# Patient Record
Sex: MALE | Race: WHITE | NOT HISPANIC OR LATINO | Employment: FULL TIME | URBAN - METROPOLITAN AREA
[De-identification: names, ages, dates, MRNs, and addresses within clinical notes are randomized per-mention and may not be internally consistent; named-entity substitution may affect disease eponyms.]

---

## 2024-03-05 ENCOUNTER — MEDICAL CORRESPONDENCE (OUTPATIENT)
Dept: HEALTH INFORMATION MANAGEMENT | Facility: CLINIC | Age: 39
End: 2024-03-05
Payer: COMMERCIAL

## 2024-03-06 DIAGNOSIS — R01.1 HEART MURMUR: Primary | ICD-10-CM

## 2024-03-13 ENCOUNTER — HOSPITAL ENCOUNTER (OUTPATIENT)
Dept: CARDIOLOGY | Facility: CLINIC | Age: 39
Discharge: HOME | End: 2024-03-13
Attending: FAMILY MEDICINE | Admitting: FAMILY MEDICINE
Payer: COMMERCIAL

## 2024-03-13 ENCOUNTER — TELEPHONE (OUTPATIENT)
Dept: CARDIOLOGY | Facility: CLINIC | Age: 39
End: 2024-03-13

## 2024-03-13 DIAGNOSIS — R01.1 HEART MURMUR: ICD-10-CM

## 2024-03-13 DIAGNOSIS — I35.1 AORTIC INSUFFICIENCY: Primary | ICD-10-CM

## 2024-03-13 LAB — LVEF ECHO: NORMAL

## 2024-03-13 PROCEDURE — 93306 TTE W/DOPPLER COMPLETE: CPT | Mod: 26 | Performed by: INTERNAL MEDICINE

## 2024-03-13 PROCEDURE — 93306 TTE W/DOPPLER COMPLETE: CPT

## 2024-03-13 NOTE — TELEPHONE ENCOUNTER
Received message from Dr. Carmona:     Marty Carmona MD [facility] Heart Team 4  Hi all,    I just read Mr. Garcia's echo, and this shows severe AI.  He needs to get in to see any MD for an urgent appointment within the next week - would you mind reaching out to him to let him know, and also alert his PCP?    Thanks!  Berto    Called to Dr. Mccollum's office at [facility] and left a message for the care team.     Called pt, no answer. Left VM requesting call back to Team 4 at [phone].     Addendum 3/13/24 - Received return call from pt. Reviewed Dr. Carmona's recommendations for an appointment within the next week due to severe AI seen on echo today. Pt verbalized understanding and agreement with plan. Offered urgent visit with Dr. Cuevas this Friday, 3/15/24 at 9:45 am and pt agreed. Message sent to scheduling. Reviewed [place] location.

## 2024-03-13 NOTE — TELEPHONE ENCOUNTER
Received return call from nursing staff at Dr. Mccollum's office, they requested echo report  faxed to [phone]. They will update Dr. Mccollum that pt has been scheduled for a Cardiology visit on 3/15/24.

## 2024-03-14 NOTE — TELEPHONE ENCOUNTER
Spoke with patient and he wanted to make sure there was no need to fast or any testing needed prior to visit with Dr. Cuevas tomorrow. Informed patient that nothing is needed prior. Dr. Cuevas will review echo results and her thoughts on next steps/follow ups. Pt verbalized understanding.

## 2024-03-14 NOTE — TELEPHONE ENCOUNTER
Received VM from patient requesting a call back as he had some questions about his upcoming visit. Tried to call patient back. No answer. Left voicemail for him to call team 4 back at [phone].

## 2024-03-15 ENCOUNTER — OFFICE VISIT (OUTPATIENT)
Dept: CARDIOLOGY | Facility: CLINIC | Age: 39
End: 2024-03-15
Payer: COMMERCIAL

## 2024-03-15 VITALS
SYSTOLIC BLOOD PRESSURE: 130 MMHG | BODY MASS INDEX: 37.59 KG/M2 | HEIGHT: 68 IN | DIASTOLIC BLOOD PRESSURE: 64 MMHG | RESPIRATION RATE: 15 BRPM | OXYGEN SATURATION: 100 % | HEART RATE: 86 BPM | WEIGHT: 248 LBS

## 2024-03-15 DIAGNOSIS — I35.1 SEVERE AORTIC VALVE REGURGITATION: Primary | ICD-10-CM

## 2024-03-15 PROCEDURE — 93000 ELECTROCARDIOGRAM COMPLETE: CPT | Performed by: INTERNAL MEDICINE

## 2024-03-15 PROCEDURE — 99205 OFFICE O/P NEW HI 60 MIN: CPT | Performed by: INTERNAL MEDICINE

## 2024-03-15 RX ORDER — DEXTROAMPHETAMINE SACCHARATE, AMPHETAMINE ASPARTATE, DEXTROAMPHETAMINE SULFATE AND AMPHETAMINE SULFATE 7.5; 7.5; 7.5; 7.5 MG/1; MG/1; MG/1; MG/1
30 TABLET ORAL 2 TIMES DAILY
COMMUNITY
Start: 2024-03-01

## 2024-03-15 RX ORDER — CETIRIZINE HYDROCHLORIDE 5 MG/1
5 TABLET ORAL EVERY MORNING
COMMUNITY

## 2024-03-15 RX ORDER — CLINDAMYCIN PHOSPHATE 11.9 MG/ML
SOLUTION TOPICAL DAILY PRN
COMMUNITY
Start: 2024-03-01 | End: 2025-03-01

## 2024-03-15 NOTE — PROGRESS NOTES
SERVICE DATE: March 15, 2024    REFERRING PROVIDER:  Dr. Marty Carmona.    PRIMARY CARE PROVIDER:  Bisi Mccollum  [facility] [place] [address]  [place] [address]    REASON FOR VISIT:  Severe aortic valve regurgitation due to possible bicuspid aortic valve.    HISTORY OF PRESENT ILLNESS:  Adam Garcia is 38 year old male, new to cardiology.  He was accompanied by his wife who is a pharmacist at [employer] [facility] [employer]. Patient is a never tobacco user, takes Adderall for ADHD, BMI is 37.    Adam has historically not sound frequent medical care and has not followed up with a PCP for many years.  In that context, he has been getting progressively short of breath with exertion for the last 8 to 10 months but has attribute it to being overweight and out of shape and did not seek medical care.  Because symptoms worsened (but no lower extremity edema, chest pain, orthopnea or PND) he established care with a primary team who auscultated a cardiac murmur and ordered an echocardiogram which showed severe aortic valve regurgitation, suspected bicuspid aortic valve, LVEF of 55-60% and patient was referred to cardiology.    I independently interpreted the transthoracic echocardiogram dated 3/13/2024.  There is severe eccentric aortic valve regurgitation. The aortic valve was not well-visualized but is likely bicuspid.  Mildly dilated left ventricle with an end-diastolic diameter of 5.7 cm.  Estimated LVEF 55-60%, normal dimension of ascending aorta 3.6 cm, normal aortic root of 3.7 cm. Normal right ventricular systolic function no other valvular heart disease.    ECG done today shows sinus rhythm with left ventricular hypertrophy by voltage criteria.    No known family history of valvular heart disease. He has no biological children.     Patient has no known history of congenital heart disease, no infectious endocarditis or chest wall irradiation, has not had a previous echocardiogram.  No tobacco use.  Social  alcohol intake.  No family history of known valvular heart disease.    On exam - /64, pulse 86 bpm, regular heart sounds, well audible holosystolic murmur heard best at the aortic area, 3/6, radiating to the right carotid.  No audible murmur of aortic coarctation.  Lungs are clear to auscultation.  No lower extremity edema.    DIAGNOSES/ASSESSMENT:  Severe aortic valve regurgitation, possibly due to bicuspid aortic valve disease.  Progressive NYHA class III dyspnea, secondary to 1.    Patient clearly has symptomatic severe aortic valve regurgitation with consistent physical examination.  His left ventricle is mildly dilated, his symptoms have been present for almost a year and progressed gradually, LVEF of 55 to 60% is mild to moderately reduced in the presence of severe AR.  I suspect his valve is bicuspid but its morphology was not clearly visualized on his transthoracic echocardiogram, so he will need additional testing to assess valve morphology, rule out concomitant aortopathy including coarctation of aorta (he is normotensive), and after testing referral to CV surgery.    PLAN:  Transesophageal echocardiogram.  Cardiac MRI.  CT angiogram of thoracic aorta.  Follow up with SHERRY to review results.  Patient has not sought dental care for several years.  Advised him to make an expedited appointment with a dentist which is a prerequisite prior to valve surgery.  I have personally discussed patient care with CV surgeon Dr. Nehemiah Kat and his RN, Marga Kenney.  Once testing is completed, consultation with Dr. Kat.  Patient is 38 years old, non-smoker.  Preoperative CT coronary angiogram may be appropriate (instead of an invasive angiogram) but will be at the discretion of Dr. Kat.  I have personally liaised with the scheduling team and ensured expedited testing.  Extensive education and counseling about bicuspid aortic valve, natural history, diagnosis of severe AR, role of surgery and need for other  "testing with patient and his wife.  Their questions were answered.        Mariel Cuevas MD      New patient.  75 minutes spent by me on the date of the encounter doing chart review, history and exam, documentation and further activities per the note.    The longitudinal plan of care for the condition(s) below were addressed during this visit. Due to the added complexity in care, I will continue to support Adam in the subsequent management of this condition(s) and with the ongoing continuity of care of this condition(s).    Problem List Items Addressed This Visit as of 3/15/2024   Severe aortic valve regurgitation.         Vitals: /64   Pulse 86   Resp 15   Ht 1.727 m (5' 8\")   Wt 112.5 kg (248 lb)   SpO2 100%   BMI 37.71 kg/m    Wt Readings from Last 5 Encounters:   03/15/24 112.5 kg (248 lb)         Orders Placed This Encounter   Procedures    CT Chest Angio w/o & w Contrast    Follow-Up with Cardiology SHERRY    EKG 12-lead complete w/read - Clinics (performed today)    Transesophageal Echocardiogram           CURRENT MEDICATIONS:  Current Outpatient Medications   Medication Sig Dispense Refill    aluminum chloride (DRYSOL) 20 % external solution       amphetamine-dextroamphetamine (ADDERALL) 30 MG tablet Take 30 mg by mouth daily      cetirizine (ZYRTEC) 5 MG tablet Take 5 mg by mouth daily      clindamycin (CLEOCIN T) 1 % external solution Apply topically 2 times daily           ALLERGIES:  No Known Allergies    PAST MEDICAL HISTORY:    Past Medical History:   Diagnosis Date    ADHD (attention deficit hyperactivity disorder)     Obesity        PAST SURGICAL HISTORY:    Past Surgical History:   Procedure Laterality Date    NO HISTORY OF SURGERY         FAMILY HISTORY:    Family History   Problem Relation Age of Onset    Valvular heart disease No family hx of                          "

## 2024-03-15 NOTE — LETTER
3/15/2024    Bisi Mccollum MD  [facility] [address]  [place] [address]    RE: Adam Garcia       Dear Colleague,     I had the pleasure of seeing Adam Garcia in the [facility].    SERVICE DATE: March 15, 2024    REFERRING PROVIDER:  Dr. Marty Carmona.    PRIMARY CARE PROVIDER:  [facility] [address] [facility] [address]  [place] [address]    REASON FOR VISIT:  Severe aortic valve regurgitation due to possible bicuspid aortic valve.    HISTORY OF PRESENT ILLNESS:  Adam Garcia is 38 year old male, new to cardiology.  He was accompanied by his wife who is a pharmacist at [employer]. Patient is a never tobacco user, takes Adderall for ADHD, BMI is 37.    Adam has historically not sound frequent medical care and has not followed up with a PCP for many years.  In that context, he has been getting progressively short of breath with exertion for the last 8 to 10 months but has attribute it to being overweight and out of shape and did not seek medical care.  Because symptoms worsened (but no lower extremity edema, chest pain, orthopnea or PND) he established care with a primary team who auscultated a cardiac murmur and ordered an echocardiogram which showed severe aortic valve regurgitation, suspected bicuspid aortic valve, LVEF of 55-60% and patient was referred to cardiology.    I independently interpreted the transthoracic echocardiogram dated 3/13/2024.  There is severe eccentric aortic valve regurgitation. The aortic valve was not well-visualized but is likely bicuspid.  Mildly dilated left ventricle with an end-diastolic diameter of 5.7 cm.  Estimated LVEF 55-60%, normal dimension of ascending aorta 3.6 cm, normal aortic root of 3.7 cm. Normal right ventricular systolic function no other valvular heart disease.    ECG done today shows sinus rhythm with left ventricular hypertrophy by voltage criteria.    No known family history of valvular  heart disease. He has no biological children.     Patient has no known history of congenital heart disease, no infectious endocarditis or chest wall irradiation, has not had a previous echocardiogram.  No tobacco use.  Social alcohol intake.  No family history of known valvular heart disease.    On exam - /64, pulse 86 bpm, regular heart sounds, well audible holosystolic murmur heard best at the aortic area, 3/6, radiating to the right carotid.  No audible murmur of aortic coarctation.  Lungs are clear to auscultation.  No lower extremity edema.    DIAGNOSES/ASSESSMENT:  Severe aortic valve regurgitation, possibly due to bicuspid aortic valve disease.  Progressive NYHA class III dyspnea, secondary to 1.    Patient clearly has symptomatic severe aortic valve regurgitation with consistent physical examination.  His left ventricle is mildly dilated, his symptoms have been present for almost a year and progressed gradually, LVEF of 55 to 60% is mild to moderately reduced in the presence of severe AR.  I suspect his valve is bicuspid but its morphology was not clearly visualized on his transthoracic echocardiogram, so he will need additional testing to assess valve morphology, rule out concomitant aortopathy including coarctation of aorta (he is normotensive), and after testing referral to CV surgery.    PLAN:  Transesophageal echocardiogram.  Cardiac MRI.  CT angiogram of thoracic aorta.  Follow up with SHERRY to review results.  Patient has not sought dental care for several years.  Advised him to make an expedited appointment with a dentist which is a prerequisite prior to valve surgery.  I have personally discussed patient care with CV surgeon Dr. Nehemiah Kat and his RN, Marga Kenney.  Once testing is completed, consultation with Dr. Kat.  Patient is 38 years old, non-smoker.  Preoperative CT coronary angiogram may be appropriate (instead of an invasive angiogram) but will be at the discretion of   "[facility].  I have personally liaised with the scheduling team and ensured expedited testing.  Extensive education and counseling about bicuspid aortic valve, natural history, diagnosis of severe AR, role of surgery and need for other testing with patient and his wife.  Their questions were answered.        Mariel Cuevas MD      New patient.  75 minutes spent by me on the date of the encounter doing chart review, history and exam, documentation and further activities per the note.    The longitudinal plan of care for the condition(s) below were addressed during this visit. Due to the added complexity in care, I will continue to support Adam in the subsequent management of this condition(s) and with the ongoing continuity of care of this condition(s).    Problem List Items Addressed This Visit as of 3/15/2024   Severe aortic valve regurgitation.         Vitals: /64   Pulse 86   Resp 15   Ht 1.727 m (5' 8\")   Wt 112.5 kg (248 lb)   SpO2 100%   BMI 37.71 kg/m    Wt Readings from Last 5 Encounters:   03/15/24 112.5 kg (248 lb)         Orders Placed This Encounter   Procedures    CT Chest Angio w/o & w Contrast    Follow-Up with Cardiology SHERRY    EKG 12-lead complete w/read - Clinics (performed today)    Transesophageal Echocardiogram           CURRENT MEDICATIONS:  Current Outpatient Medications   Medication Sig Dispense Refill    aluminum chloride (DRYSOL) 20 % external solution       amphetamine-dextroamphetamine (ADDERALL) 30 MG tablet Take 30 mg by mouth daily      cetirizine (ZYRTEC) 5 MG tablet Take 5 mg by mouth daily      clindamycin (CLEOCIN T) 1 % external solution Apply topically 2 times daily           ALLERGIES:  No Known Allergies    PAST MEDICAL HISTORY:    Past Medical History:   Diagnosis Date    ADHD (attention deficit hyperactivity disorder)     Obesity        PAST SURGICAL HISTORY:    Past Surgical History:   Procedure Laterality Date    NO HISTORY OF SURGERY         FAMILY " HISTORY:    Family History   Problem Relation Age of Onset    Valvular heart disease No family hx of        Thank you for allowing me to participate in the care of your patient.      Sincerely,     Mariel Cuveas MD     [facility]  cc:   Marty Carmona MD  [address] [address]

## 2024-03-18 DIAGNOSIS — I35.1 AORTIC VALVE INSUFFICIENCY, ETIOLOGY OF CARDIAC VALVE DISEASE UNSPECIFIED: Primary | ICD-10-CM

## 2024-03-22 ENCOUNTER — TELEPHONE (OUTPATIENT)
Dept: CARDIOLOGY | Facility: CLINIC | Age: 39
End: 2024-03-22
Payer: COMMERCIAL

## 2024-03-22 NOTE — TELEPHONE ENCOUNTER
Attempted to contact patient to review prep for YOHANNES on 3/28/2024. Left message for patient to call back to Team 2 R.N.s @ [phone]       DCCV/YOHANNES prep instructions    Patient is scheduled for a YOHANNES at [facility] [place] [address] [place] [address] - Main Entrance of the Hospital, on 3/28/2024.  Check in time is at 0730 and procedure to follow.    Patient instructed to remain NPO for solid foods and liquids for 8 hours prior to arrival for their YOHANNES or YOHANNES with DCCV.    Patient is not diabetic.     Patient is not taking Digoxin.    Patient is taking not on diuretics. and has been advised to hold this the morning of the procedure.    Pt is not on a SGLT2 inhibitor.    Pt is not on a GLP-1 Agonist    Patient advised to take their other daily medications the morning of the procedure with small sips of water.     Verified patient has someone available to drive them home from the hospital and can stay with them for 24 hours after the procedure.     Patient advised to notify care team with any new COVID like symptoms prior to procedure.    Patient advised to notify care team with any new COVID like symptoms prior to procedure. Day of procedure phone number: Joy at [phone]    Patient is aware of visitor policy.    Patient expresses understanding of above instructions and denies further questions at this time.      Mely Diaz RN  [facility]

## 2024-03-28 ENCOUNTER — HOSPITAL ENCOUNTER (OUTPATIENT)
Facility: CLINIC | Age: 39
Discharge: HOME | End: 2024-03-28
Admitting: INTERNAL MEDICINE
Payer: COMMERCIAL

## 2024-03-28 ENCOUNTER — HOSPITAL ENCOUNTER (OUTPATIENT)
Dept: CARDIOLOGY | Facility: CLINIC | Age: 39
Discharge: HOME | End: 2024-03-28
Attending: INTERNAL MEDICINE | Admitting: INTERNAL MEDICINE
Payer: COMMERCIAL

## 2024-03-28 VITALS
SYSTOLIC BLOOD PRESSURE: 106 MMHG | RESPIRATION RATE: 16 BRPM | BODY MASS INDEX: 37.28 KG/M2 | WEIGHT: 246 LBS | OXYGEN SATURATION: 97 % | DIASTOLIC BLOOD PRESSURE: 50 MMHG | HEIGHT: 68 IN | HEART RATE: 76 BPM

## 2024-03-28 DIAGNOSIS — I35.1 SEVERE AORTIC VALVE REGURGITATION: ICD-10-CM

## 2024-03-28 LAB — LVEF ECHO: NORMAL

## 2024-03-28 PROCEDURE — 93325 DOPPLER ECHO COLOR FLOW MAPG: CPT

## 2024-03-28 PROCEDURE — 258N000003 HC RX IP 258 OP 636

## 2024-03-28 PROCEDURE — 93320 DOPPLER ECHO COMPLETE: CPT | Mod: 26 | Performed by: INTERNAL MEDICINE

## 2024-03-28 PROCEDURE — 999N000184 HC STATISTIC TELEMETRY

## 2024-03-28 PROCEDURE — 250N000011 HC RX IP 250 OP 636

## 2024-03-28 PROCEDURE — 93325 DOPPLER ECHO COLOR FLOW MAPG: CPT | Mod: 26 | Performed by: INTERNAL MEDICINE

## 2024-03-28 PROCEDURE — 999N000183 HC STATISTIC TEE INCLUDES SEDATION

## 2024-03-28 PROCEDURE — 250N000009 HC RX 250

## 2024-03-28 PROCEDURE — 93312 ECHO TRANSESOPHAGEAL: CPT | Mod: 26 | Performed by: INTERNAL MEDICINE

## 2024-03-28 RX ORDER — LIDOCAINE 50 MG/G
OINTMENT TOPICAL ONCE
Status: COMPLETED | OUTPATIENT
Start: 2024-03-28 | End: 2024-03-28

## 2024-03-28 RX ORDER — FENTANYL CITRATE 50 UG/ML
25 INJECTION, SOLUTION INTRAMUSCULAR; INTRAVENOUS
Status: DISCONTINUED | OUTPATIENT
Start: 2024-03-28 | End: 2024-03-28 | Stop reason: HOSPADM

## 2024-03-28 RX ORDER — FLUMAZENIL 0.1 MG/ML
0.2 INJECTION, SOLUTION INTRAVENOUS
Status: DISCONTINUED | OUTPATIENT
Start: 2024-03-28 | End: 2024-03-28 | Stop reason: HOSPADM

## 2024-03-28 RX ORDER — LIDOCAINE HYDROCHLORIDE 40 MG/ML
1.5 SOLUTION TOPICAL ONCE
Status: COMPLETED | OUTPATIENT
Start: 2024-03-28 | End: 2024-03-28

## 2024-03-28 RX ORDER — LIDOCAINE 40 MG/G
CREAM TOPICAL
Status: DISCONTINUED | OUTPATIENT
Start: 2024-03-28 | End: 2024-03-28 | Stop reason: HOSPADM

## 2024-03-28 RX ORDER — DEXTROSE MONOHYDRATE 25 G/50ML
9.5 INJECTION, SOLUTION INTRAVENOUS
Status: DISCONTINUED | OUTPATIENT
Start: 2024-03-28 | End: 2024-03-28 | Stop reason: HOSPADM

## 2024-03-28 RX ORDER — SODIUM CHLORIDE 9 MG/ML
INJECTION, SOLUTION INTRAVENOUS CONTINUOUS PRN
Status: DISCONTINUED | OUTPATIENT
Start: 2024-03-28 | End: 2024-03-28 | Stop reason: HOSPADM

## 2024-03-28 RX ORDER — OMEGA-3 FATTY ACIDS/FISH OIL 300-1000MG
400 CAPSULE ORAL EVERY 4 HOURS PRN
Status: ON HOLD | COMMUNITY
End: 2024-06-16

## 2024-03-28 RX ORDER — FENTANYL CITRATE 50 UG/ML
50 INJECTION, SOLUTION INTRAMUSCULAR; INTRAVENOUS ONCE
Status: COMPLETED | OUTPATIENT
Start: 2024-03-28 | End: 2024-03-28

## 2024-03-28 RX ORDER — NALOXONE HYDROCHLORIDE 0.4 MG/ML
0.4 INJECTION, SOLUTION INTRAMUSCULAR; INTRAVENOUS; SUBCUTANEOUS
Status: DISCONTINUED | OUTPATIENT
Start: 2024-03-28 | End: 2024-03-28 | Stop reason: HOSPADM

## 2024-03-28 RX ORDER — NALOXONE HYDROCHLORIDE 0.4 MG/ML
0.2 INJECTION, SOLUTION INTRAMUSCULAR; INTRAVENOUS; SUBCUTANEOUS
Status: DISCONTINUED | OUTPATIENT
Start: 2024-03-28 | End: 2024-03-28 | Stop reason: HOSPADM

## 2024-03-28 RX ORDER — GLYCOPYRROLATE 0.2 MG/ML
0.1 INJECTION, SOLUTION INTRAMUSCULAR; INTRAVENOUS ONCE
Status: COMPLETED | OUTPATIENT
Start: 2024-03-28 | End: 2024-03-28

## 2024-03-28 RX ADMIN — MIDAZOLAM 2 MG: 1 INJECTION INTRAMUSCULAR; INTRAVENOUS at 09:24

## 2024-03-28 RX ADMIN — GLYCOPYRROLATE 0.1 MG: 0.2 INJECTION, SOLUTION INTRAMUSCULAR; INTRAVENOUS at 09:03

## 2024-03-28 RX ADMIN — MIDAZOLAM 1 MG: 1 INJECTION INTRAMUSCULAR; INTRAVENOUS at 09:31

## 2024-03-28 RX ADMIN — LIDOCAINE HYDROCHLORIDE 1.5 ML: 40 SOLUTION TOPICAL at 09:01

## 2024-03-28 RX ADMIN — TOPICAL ANESTHETIC 0.5 ML: 200 SPRAY DENTAL; PERIODONTAL at 09:16

## 2024-03-28 RX ADMIN — FENTANYL CITRATE 50 MCG: 50 INJECTION, SOLUTION INTRAMUSCULAR; INTRAVENOUS at 09:30

## 2024-03-28 RX ADMIN — MIDAZOLAM 0.5 MG: 1 INJECTION INTRAMUSCULAR; INTRAVENOUS at 09:37

## 2024-03-28 RX ADMIN — MIDAZOLAM 1 MG: 1 INJECTION INTRAMUSCULAR; INTRAVENOUS at 09:27

## 2024-03-28 RX ADMIN — FENTANYL CITRATE 50 MCG: 50 INJECTION, SOLUTION INTRAMUSCULAR; INTRAVENOUS at 09:25

## 2024-03-28 RX ADMIN — MIDAZOLAM 0.5 MG: 1 INJECTION INTRAMUSCULAR; INTRAVENOUS at 09:45

## 2024-03-28 RX ADMIN — FENTANYL CITRATE 25 MCG: 50 INJECTION, SOLUTION INTRAMUSCULAR; INTRAVENOUS at 09:37

## 2024-03-28 RX ADMIN — SODIUM CHLORIDE: 9 INJECTION, SOLUTION INTRAVENOUS at 08:27

## 2024-03-28 ASSESSMENT — ACTIVITIES OF DAILY LIVING (ADL)
ADLS_ACUITY_SCORE: 35

## 2024-03-28 NOTE — PROGRESS NOTES
Care Suites Admission Nursing Note    Patient Information  Name: Adam Garcia  Age: 38 year old  Reason for admission: YOHANNES  Care Suites arrival time: 0730    Visitor Information  Name: Tuesday- wife     Patient Admission/Assessment   Pre-procedure assessment complete: Yes  If abnormal assessment/labs, provider notified: N/A  NPO: Yes  Medications held per instructions/orders: N/A  Consent: deferred  If applicable, pregnancy test status: deferred  Patient oriented to room: Yes  Education/questions answered: Yes  Plan/other: AVS reviewed pre procedure with pt and wife. Proceed as planned per orders.    Discharge Planning  Discharge name/phone number: Wife [phone]  Overnight post sedation caregiver: Wife  Discharge location: home    Kortney Marino RN

## 2024-03-28 NOTE — PROGRESS NOTES
Care Suites Discharge Nursing Note    Patient Information  Name: Adam Garcia  Age: 38 year old    Discharge Education:  Discharge instructions reviewed: Yes  Additional education/resources provided: none  Patient/patient representative verbalizes understanding: Yes  Patient discharging on new medications: No  Medication education completed: N/A    Discharge Plans:   Discharge location: home  Discharge ride contacted: Yes  Approximate discharge time: 1100    Discharge Criteria:  Discharge criteria met and vital signs stable: Yes  Reports mild sore throat, no diff swallowing.      Patient Belongs:  Patient belongings returned to patient: Yes    Kortney Marino RN

## 2024-03-28 NOTE — PROGRESS NOTES
Care Suites Procedure Nursing Note    Patient Information  Name: Adam Garcia  Age: 38 year old    Procedure  Procedure: YOHANNES  Procedure start time: 0924  Procedure complete time: 1005  Concerns/abnormal assessment: none  If abnormal assessment, provider notified: N/A  Plan/Other: YOHANNES complete without difficulty.  Pt drowsy throughout intermittently awake but appeared comfortable.  VSS throughout.    Sedation given: Versed 5 mg and Fentanyl 125 mcg.  Tolerated well.    Kortney Marino RN

## 2024-03-28 NOTE — PRE-PROCEDURE
GENERAL PRE-PROCEDURE:   Procedure:  YOHANNES  Date/Time:  3/28/2024 9:30 AM    Written consent obtained?: Yes    Risks and benefits: Risks, benefits and alternatives were discussed    Consent given by:  Patient  Patient states understanding of procedure being performed: Yes    Patient's understanding of procedure matches consent: Yes    Procedure consent matches procedure scheduled: Yes    Expected level of sedation:  Moderate  Appropriately NPO:  Yes  ASA Class:  3  Mallampati  :  Grade 2- soft palate, base of uvula, tonsillar pillars, and portion of posterior pharyngeal wall visible  Lungs:  Lungs clear with good breath sounds bilaterally  Heart:  Normal heart sounds and rate and systolic murmur  History & Physical reviewed:  History and physical reviewed and no updates needed  Statement of review:  I have reviewed the lab findings, diagnostic data, medications, and the plan for sedation    The risks and benefits of YOHANNES have been discussed with the patient in detail. Patient denies any swallowing difficulty or active upper GI bleeding problems. The patient understands the above mentioned risks and is willing to proceed with the YOHANNES.

## 2024-03-28 NOTE — DISCHARGE INSTRUCTIONS
YOHANNES  (Transesophageal Echocardiogram)  Discharge Instructions    After you go home:    Have an adult stay with you for 6 hours.       For 24 hours - due to the sedation you received:  Relax and take it easy.  Do NOT make any important or legal decisions.  Do NOT drive or operate machines at home or at work.  Do NOT drink alcohol.    Diet:  You may resume your normal diet, but no scratchy foods for two days.  If your throat is sore, eat cold, bland or soft foods.  You may have heartburn if the tube used in the exam entered your stomach.  If so:   - Do not eat acidic and spicy foods.   - Do not eat three hours before bedtime. Clear liquids are okay.   - When lying down, use two pillows to raise your head.    Medicines:    Take your medications, including blood thinners, unless your provider tells you not to.  If you have stopped any medicines, check with your provider about when to restart them.  You may take Tylenol (Acetaminophen) if your throat is sore.  You may take antacids if you have heartburn.      Follow Up Appointments:    Follow up with your cardiologist at [facility] of patient preference as instructed.  Follow up with your primary care provider as needed.    Call the clinic if:    You have heartburn that is severe or lasts more than 72 hours.  You have a sore throat that feels worse after 72 hours.  You have shortness of breath, neck pain, chest pain, fever, chills, coughing up blood, or other unusual signs.  Questions or concerns      [facility] [place]:    [phone] [facility] (7 days a week)

## 2024-04-01 ENCOUNTER — TELEPHONE (OUTPATIENT)
Dept: CARDIOLOGY | Facility: CLINIC | Age: 39
End: 2024-04-01
Payer: COMMERCIAL

## 2024-04-01 NOTE — TELEPHONE ENCOUNTER
Message from patient asking if he can keep using his adderall? PCP won't refill without OK from cardiology. Patient will be out of meds in a couple days.      Per Dr. Cuevas's dictation 3/15/2024:  Severe aortic valve regurgitation, possibly due to bicuspid aortic valve disease.  Progressive NYHA class III dyspnea, secondary to 1.    Patient has scheduled his work-up:  4/8 CTA coronary and CTA chest  4/11 MRI  4/16 see Dr. Kat  4/29 see SHERRY Gogo More    Will message Dr. Cuevas to review

## 2024-04-02 NOTE — TELEPHONE ENCOUNTER
Spoke with Patient and Dr. Cuevas's reply reviewed.  Patient will contact PCP regarding reply -     Dr. Cuevas's reply -   Yes, okay. 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 PCP to refill Adderall if appropriate - will not be done by cardiology.     Thanks.   JEAN

## 2024-04-08 ENCOUNTER — HOSPITAL ENCOUNTER (OUTPATIENT)
Dept: CARDIOLOGY | Facility: CLINIC | Age: 39
Discharge: HOME | End: 2024-04-08
Attending: INTERNAL MEDICINE
Payer: COMMERCIAL

## 2024-04-08 ENCOUNTER — HOSPITAL ENCOUNTER (OUTPATIENT)
Dept: CARDIOLOGY | Facility: CLINIC | Age: 39
Discharge: HOME | End: 2024-04-08
Attending: SURGERY
Payer: COMMERCIAL

## 2024-04-08 VITALS — HEART RATE: 67 BPM | DIASTOLIC BLOOD PRESSURE: 50 MMHG | SYSTOLIC BLOOD PRESSURE: 109 MMHG

## 2024-04-08 DIAGNOSIS — I35.1 SEVERE AORTIC VALVE REGURGITATION: ICD-10-CM

## 2024-04-08 DIAGNOSIS — I35.1 AORTIC VALVE INSUFFICIENCY, ETIOLOGY OF CARDIAC VALVE DISEASE UNSPECIFIED: ICD-10-CM

## 2024-04-08 PROCEDURE — 75565 CARD MRI VELOC FLOW MAPPING: CPT | Mod: 26 | Performed by: INTERNAL MEDICINE

## 2024-04-08 PROCEDURE — 75561 CARDIAC MRI FOR MORPH W/DYE: CPT | Mod: 26 | Performed by: INTERNAL MEDICINE

## 2024-04-08 PROCEDURE — A9585 GADOBUTROL INJECTION: HCPCS | Performed by: INTERNAL MEDICINE

## 2024-04-08 PROCEDURE — 75565 CARD MRI VELOC FLOW MAPPING: CPT

## 2024-04-08 PROCEDURE — 255N000002 HC RX 255 OP 636: Performed by: INTERNAL MEDICINE

## 2024-04-08 PROCEDURE — 71275 CT ANGIOGRAPHY CHEST: CPT | Mod: 26 | Performed by: INTERNAL MEDICINE

## 2024-04-08 PROCEDURE — 250N000011 HC RX IP 250 OP 636: Performed by: SURGERY

## 2024-04-08 PROCEDURE — 250N000013 HC RX MED GY IP 250 OP 250 PS 637: Performed by: SURGERY

## 2024-04-08 PROCEDURE — 75574 CT ANGIO HRT W/3D IMAGE: CPT | Mod: 26 | Performed by: INTERNAL MEDICINE

## 2024-04-08 PROCEDURE — 250N000009 HC RX 250: Performed by: SURGERY

## 2024-04-08 PROCEDURE — 75574 CT ANGIO HRT W/3D IMAGE: CPT

## 2024-04-08 PROCEDURE — 71275 CT ANGIOGRAPHY CHEST: CPT

## 2024-04-08 RX ORDER — IVABRADINE 5 MG/1
5-15 TABLET, FILM COATED ORAL
Status: COMPLETED | OUTPATIENT
Start: 2024-04-08 | End: 2024-04-08

## 2024-04-08 RX ORDER — METOPROLOL TARTRATE 25 MG/1
25-100 TABLET, FILM COATED ORAL
Status: COMPLETED | OUTPATIENT
Start: 2024-04-08 | End: 2024-04-08

## 2024-04-08 RX ORDER — ACYCLOVIR 200 MG/1
0-1 CAPSULE ORAL
Status: DISCONTINUED | OUTPATIENT
Start: 2024-04-08 | End: 2024-04-09 | Stop reason: HOSPADM

## 2024-04-08 RX ORDER — DIPHENHYDRAMINE HYDROCHLORIDE 50 MG/ML
25-50 INJECTION INTRAMUSCULAR; INTRAVENOUS
Status: DISCONTINUED | OUTPATIENT
Start: 2024-04-08 | End: 2024-04-09 | Stop reason: HOSPADM

## 2024-04-08 RX ORDER — DIPHENHYDRAMINE HCL 25 MG
25 CAPSULE ORAL
Status: DISCONTINUED | OUTPATIENT
Start: 2024-04-08 | End: 2024-04-09 | Stop reason: HOSPADM

## 2024-04-08 RX ORDER — METHYLPREDNISOLONE SODIUM SUCCINATE 125 MG/2ML
125 INJECTION, POWDER, LYOPHILIZED, FOR SOLUTION INTRAMUSCULAR; INTRAVENOUS
Status: DISCONTINUED | OUTPATIENT
Start: 2024-04-08 | End: 2024-04-09 | Stop reason: HOSPADM

## 2024-04-08 RX ORDER — ONDANSETRON 2 MG/ML
4 INJECTION INTRAMUSCULAR; INTRAVENOUS
Status: DISCONTINUED | OUTPATIENT
Start: 2024-04-08 | End: 2024-04-09 | Stop reason: HOSPADM

## 2024-04-08 RX ORDER — METOPROLOL TARTRATE 1 MG/ML
5-15 INJECTION, SOLUTION INTRAVENOUS
Status: DISCONTINUED | OUTPATIENT
Start: 2024-04-08 | End: 2024-04-09 | Stop reason: HOSPADM

## 2024-04-08 RX ORDER — GADOBUTROL 604.72 MG/ML
14 INJECTION INTRAVENOUS ONCE
Status: COMPLETED | OUTPATIENT
Start: 2024-04-08 | End: 2024-04-08

## 2024-04-08 RX ORDER — DILTIAZEM HCL 60 MG
120 TABLET ORAL
Status: DISCONTINUED | OUTPATIENT
Start: 2024-04-08 | End: 2024-04-09 | Stop reason: HOSPADM

## 2024-04-08 RX ORDER — IOPAMIDOL 755 MG/ML
500 INJECTION, SOLUTION INTRAVASCULAR ONCE
Status: COMPLETED | OUTPATIENT
Start: 2024-04-08 | End: 2024-04-08

## 2024-04-08 RX ORDER — DILTIAZEM HYDROCHLORIDE 5 MG/ML
10-15 INJECTION INTRAVENOUS
Status: DISCONTINUED | OUTPATIENT
Start: 2024-04-08 | End: 2024-04-09 | Stop reason: HOSPADM

## 2024-04-08 RX ORDER — NITROGLYCERIN 0.4 MG/1
0.4 TABLET SUBLINGUAL
Status: DISCONTINUED | OUTPATIENT
Start: 2024-04-08 | End: 2024-04-09 | Stop reason: HOSPADM

## 2024-04-08 RX ADMIN — METOPROLOL TARTRATE 50 MG: 50 TABLET, FILM COATED ORAL at 08:19

## 2024-04-08 RX ADMIN — METOPROLOL TARTRATE 10 MG: 5 INJECTION INTRAVENOUS at 09:30

## 2024-04-08 RX ADMIN — IOPAMIDOL 120 ML: 755 INJECTION, SOLUTION INTRAVENOUS at 09:58

## 2024-04-08 RX ADMIN — IVABRADINE 10 MG: 5 TABLET, FILM COATED ORAL at 08:20

## 2024-04-08 RX ADMIN — GADOBUTROL 14 ML: 604.72 INJECTION INTRAVENOUS at 11:38

## 2024-04-08 RX ADMIN — NITROGLYCERIN 0.4 MG: 0.4 TABLET SUBLINGUAL at 09:46

## 2024-04-09 ENCOUNTER — TELEPHONE (OUTPATIENT)
Dept: CARDIOLOGY | Facility: CLINIC | Age: 39
End: 2024-04-09
Payer: COMMERCIAL

## 2024-04-09 NOTE — TELEPHONE ENCOUNTER
MRI routed to Dr Cuevas for review, ordered for pre-surgery planning. CT chest, CTA angiogram, and YOHANNES previously completed. Surgery consult is 4/16/24 w/ Dr Kat.       1. The LV is severely dilated with normal wall thickness. The global systolic function is mildly reduced. The LVEF is 53%. There is mild global hypokinesis of the left ventricle.   2. The RV is normal in cavity size. The global systolic function is low normal. The RVEF is 50%.   3. Both atria are normal in size.  4. The aortic valve is trileaflet. There is severe aortic regurgitation (regurgitant volume of 105 ml, corresponding to a regurgitant fraction of 46%).  The jet appears to originate from a region of  malcoaptation between the non-coronary and left coronary cusps.  Diastolic flow reversal is noted within the descending thoracic aorta.   5. Late gadolinium enhancement imaging shows no MI, fibrosis or infiltrative disease.      CONCLUSIONS:   1. Severely dilated left ventricle with mildly reduced systolic function.  2. Trileaflet aortic valve with severe aortic regurgitation. The jet appears to originate from a region of malcoaptation between the non-coronary and left coronary cusps.   3. No late enhancement to suggest prior infarct, inflammation or infiltration.

## 2024-04-09 NOTE — TELEPHONE ENCOUNTER
Mariel Cuevas MD  You; [facility] Team 24 minutes ago (10:19 AM)     Results as expected. Thanks.    SJ       [employer] message sent to patient.

## 2024-04-16 ENCOUNTER — DOCUMENTATION ONLY (OUTPATIENT)
Dept: OTHER | Facility: CLINIC | Age: 39
End: 2024-04-16

## 2024-04-16 ENCOUNTER — OFFICE VISIT (OUTPATIENT)
Dept: CARDIOLOGY | Facility: CLINIC | Age: 39
End: 2024-04-16
Payer: COMMERCIAL

## 2024-04-16 VITALS
BODY MASS INDEX: 36.37 KG/M2 | OXYGEN SATURATION: 100 % | HEART RATE: 89 BPM | WEIGHT: 240 LBS | SYSTOLIC BLOOD PRESSURE: 139 MMHG | HEIGHT: 68 IN | DIASTOLIC BLOOD PRESSURE: 68 MMHG

## 2024-04-16 DIAGNOSIS — I35.1 SEVERE AORTIC VALVE REGURGITATION: Primary | ICD-10-CM

## 2024-04-16 PROCEDURE — 99205 OFFICE O/P NEW HI 60 MIN: CPT | Performed by: SURGERY

## 2024-04-16 NOTE — PROGRESS NOTES
I saw patient and his wife in consultation with Dr. Kat. Pre op needs, dental clearance discussed. Pre op education done. Plan AVR/R in near future.

## 2024-04-16 NOTE — PROGRESS NOTES
[facility]  Cardiovascular and Thoracic Surgery Clinic Consultation    Adam Garcia MRN# [id number]   YOB: 1985 Age: 38 year old   Date of Admission: (Not on file)     Reason for consult: I was asked by Dr. Cuevas to evaluate this patient for severe symptomatic aortic valve insufficiency           Assessment and Plan:   Mr. Garcia is a very pleasant 39 yo male with no significant past medical history who was recently found to have severe AI. YOHANNES demonstrates severe AI with an eccentric jet from what appears to be prolapse of the noncoronary cusp as the etiology of the AI jet. He endorses dyspnea on exertion and fatigue, symptoms associated with severe AI. LV is mildly dilated. He has no coronary artery disease, and his aortic root and ascending aorta is normal.    My recommendation is a mechanical aortic valve replacement. However, it is possible that the aortic valve could be repaired if the underlying etiology is indeed from an isolated noncoronary cusp prolapse. This will be determined intraoperatively. He understands the need for lifelong anticoagulation with Coumadin if the aortic valve is replaced, which I explained will be the likely case.    I explained to him the diagnosis in detail, the image findings, and the reason for recommending the proposed operation. I went over the risks and benefits of the operation, as well as the possible complications associated with the surgery. These complications include, but are not strictly limited to, infection, bleeding, stroke, postop MI, postop arrhythmias, pulmonary and renal complications. I think he is an overall excellent surgical candidate, and I quoted an operative mortality risk of 1%. He understands and agrees to proceed.    It was a pleasure to meet Mr. Garcia today, and thank you very much for this referral.               Attestation:  Nehemiah Kat MD           Chief Complaint:   Shortness of breath: with  exertion.               History of Present Illness:   This patient is a 38 year old male who presents with severe aortic valve regurgitation. He was referred to me for a surgical evaluation.               Past Medical History:     Past Medical History:   Diagnosis Date    ADHD (attention deficit hyperactivity disorder)     Obesity              Past Surgical History:     Past Surgical History:   Procedure Laterality Date    NO HISTORY OF SURGERY                 Social History:     Social History     Tobacco Use    Smoking status: Former     Types: Cigarettes    Smokeless tobacco: Never   Substance Use Topics    Alcohol use: Yes             Family History:     Family History   Problem Relation Age of Onset    Valvular heart disease No family hx of              Immunizations:     Immunization History   Administered Date(s) Administered    COVID-19 12+ (2023-24) (Pfizer) 10/20/2023    COVID-19 Bivalent 12+ (Pfizer) 10/14/2022    COVID-19 MONOVALENT 12+ (Pfizer) 12/11/2021             Allergies:   No Known Allergies          Medications:     Current Outpatient Medications   Medication Sig Dispense Refill    aluminum chloride (DRYSOL) 20 % external solution       amphetamine-dextroamphetamine (ADDERALL) 30 MG tablet Take 30 mg by mouth daily      cetirizine (ZYRTEC) 5 MG tablet Take 5 mg by mouth daily      clindamycin (CLEOCIN T) 1 % external solution Apply topically 2 times daily      ibuprofen (ADVIL/MOTRIN) 200 MG capsule Take 400 mg by mouth every 4 hours as needed for fever       No current facility-administered medications for this visit.             Review of Systems:   The 10 point Review of Systems is negative other than noted in the HPI         Physical Exam:   Vitals were reviewed                     General: pleasant, in no acute distress  CV: RRR, normal S1 and S2, grade 2/6 diastolic murmur at the RUSB  Resp: CTAB  Abd: soft, NT/ND  Ext: no edema or cyanosis  Neuro: no focal deficits              Data:   No  "results found for: \"WBC\", \"HGB\", \"HCT\", \"PLT\", \"NA\", \"POTASSIUM\", \"CHLORIDE\", \"CO2\", \"BUN\", \"CR\", \"GLC\", \"SED\", \"DD\", \"DIMER\", \"NTBNPI\", \"NTBNP\", \"TROPONIN\", \"TROPI\", \"TROPR\", \"TROPN\", \"AST\", \"ALT\", \"GGT\", \"ALKPHOS\", \"BILITOTAL\", \"BILIDIRECT\", \"ANSHUL\", \"INR\"      TTE 3/13/24:  Reading Physician Reading Date Result Priority   Marty Carmona MD  [phone]  [phone] 3/13/2024      Narrative & Impression  [id number]  [facility]  [id number]  [id number]^GEOVANY^MONROE     [facility]  Echocardiography Laboratory  [address] [place] [address]     Name: YOAN NAYAK  MRN: [id number]  : 1985  Study Date: 2024 07:21 AM  Age: 38 yrs  Gender: Male  Patient Location: [facility]  Reason For Study: Heart murmur  Ordering Physician: MONROE MCCOLLUM  Referring Physician: Monroe Mccollum MD  Performed By: Pascale Cox RDCS     BSA: 2.3 m2  Height: 68 in  Weight: 253 lb  HR: 86  ______________________________________________________________________________  Procedure  Complete Echo Adult.  ______________________________________________________________________________  Interpretation Summary     1. Severe (4+) aortic insufficiency (EROA 0.6 cm2,  cc, holodiastolic  flow reversal in the descending aorta). No aortic stenosis.  2. Aortic valve morphology is incompletely visualized but suspect bicuspid  aortic valve.  3. LV is mild-moderately dilated. LVEF is normal at 55-60%. No regional wall  motion abnormalities.  4. Normal RV size and systolic function.  5. LA is moderately dilated. RA is normal size.  6. No other significant valvular abnormalities.  7. Normal caliber of the aortic root and ascending aorta.     No prior study available for comparison.     Patient and referring physician will be contacted to arrange expedited  cardiology evaluation for severe aortic insufficiency.  ______________________________________________________________________________  Left " Ventricle  The left ventricle is moderately dilated. There is normal left ventricular  wall thickness. Left ventricular diastolic function is indeterminate. The  visual ejection fraction is 55-60%. No regional wall motion abnormalities  noted.     Right Ventricle  The right ventricle is normal size. The right ventricular systolic function is  normal.     Atria  The left atrium is moderately dilated. Right atrial size is normal.     Mitral Valve  The mitral valve leaflets are mildly thickened. There is trace mitral  regurgitation. There is no mitral valve stenosis.     Tricuspid Valve  The tricuspid valve is not well visualized, but is grossly normal. There is  trace tricuspid regurgitation. The right ventricular systolic pressure is  approximated at 10.6 mmHg plus the right atrial pressure. IVC diameter <2.1 cm  collapsing >50% with sniff suggests a normal RA pressure of 3 mmHg.     Aortic Valve  A bicuspid aortic valve cannot be excluded. There is severe (4+) aortic  regurgitation. No aortic stenosis is present.     Pulmonic Valve  The pulmonic valve is not well visualized.     Vessels  The aortic root is normal size. Normal size ascending aorta.     Pericardium  There is no pericardial effusion.     ______________________________________________________________________________  MMode/2D Measurements & Calculations  IVSd: 0.80 cm  LVIDd: 5.7 cm  LVIDs: 3.0 cm  LVPWd: 0.73 cm  IVC diam: 1.4 cm  FS: 47.5 %     LV mass(C)d: 160.9 grams  LV mass(C)dI: 71.2 grams/m2  Ao root diam: 3.7 cm  LA dimension: 4.2 cm  asc Aorta Diam: 3.6 cm  LA/Ao: 1.1  LVOT diam: 2.8 cm  LVOT area: 6.2 cm2  Ao root diam index Ht(cm/m): 2.1  Ao root diam index BSA (cm/m2): 1.6  Asc Ao diam index BSA (cm/m2): 1.6  Asc Ao diam index Ht(cm/m): 2.1  LA Volume Index (BP): 48.3 ml/m2  RWT: 0.26     Doppler Measurements & Calculations  Ao V2 max: 176.8 cm/sec  Ao max P.5 mmHg  AI P1/2t: 160.7 msec  PA acc time: 0.13 sec  TR max christina: 162.9  cm/sec  TR max PG: 10.6 mmHg  Peak E' Andre: 14.7 cm/sec     ______________________________________________________________________________  Report approved by: MD Marty Lao 2024 02:50 PM          YOHANNES 3/28/24:  Transesophageal Echocardiogram  Order: [id number]  Status: Edited Result - FINAL       Visible to patient: Yes (seen)       Next appt: None       Dx: Severe aortic valve regurgitation    1 Result Note  Details    Reading Physician Reading Date Result Priority   Lynda Rothman MD  [phone]  [phone] 3/28/2024      Narrative & Impression  [id number]  [id number]  [id number]^BELINDA^FERNANDO^NORBERT     [facility]  Echocardiography Laboratory  [address] [place] [address]     Name: YOAN NAYAK  MRN: [id number]  : 1985  Study Date: 2024 09:24 AM  Age: 38 yrs  Gender: Male  Patient Location: [facility]  Reason For Study: Severe aortic valve regurgitation  Ordering Physician: FERNANDO TREVINO  Referring Physician: FERNANDO TREVINO  Performed By: Erin Cali     BSA: 2.2 m2  Height: 68 in  Weight: 248 lb  HR: 83  BP: 125/52 mmHg  ______________________________________________________________________________  Procedure  Complete YOHANNES Adult. YOHANNES Probe serial #[id number] was used during the procedure.  The heart rate, respiratory rate and response to care were monitored  throughout the procedure with the assistance of the nurse. 3D image  acquisition, reconstruction, and real-time interpretation was performed.  ______________________________________________________________________________  Interpretation Summary     This was a transesophageal echocardiogram requested to evaluate aortic  insufficiency.     The left ventricle is mildly dilated visually. There is likely mild eccentric  hypertrophy.  Normal left ventricular systolic function. Visually estimated around 60%.  Normal RV size and systolic function.  The aortic valve appears to be functionally  trileaflet with an extremely small  area of minimal effusion around the RCC NCC. Genetic testing for BAV spectrum  may still be useful.  However there is severe prolapse of the noncoronary leaflet of the aortic  valve leading to an eccentric jet of severe aortic insufficiency.  No other hemodynamically significant valve disease.  No left atrial appendage thrombus negative bubble study.  ______________________________________________________________________________  YOHANNES  Versed (5mg) was given intravenously. Fentanyl (125mcg) was given  intravenously. I determined this patient to be an appropriate candidate for  the planned sedation and procedure and have reassessed the patient immediately  prior to sedation and procedure. Total sedation time: 34 minutes of continuous  bedside 1:1 monitoring. Consent to the procedure was obtained prior to  sedation. Prior to the exam, the oral cavity was checked and no overcrowding  was noted. The transesophageal probe was passed without difficulty. There were  no complications associated with this procedure.     Left Ventricle  The left ventricle is mildly dilated. There is mild eccentric left ventricular  hypertrophy. The visual ejection fraction is 55-60%. No regional wall motion  abnormalities noted.     Right Ventricle  The right ventricle is normal in size and function.     Atria  Normal left atrial size. Right atrial size is normal. There is no color  Doppler evidence of an atrial shunt. A contrast injection (Bubble Study) was  performed that was negative for flow across the interatrial septum. No  thrombus is detected in the left atrial appendage.     Mitral Valve  The mitral valve is normal in structure and function. There is physiologic  mitral regurgitation. There is no mitral valve stenosis.     Tricuspid Valve  The tricuspid valve is normal in structure and function. There is physiologic  tricuspid regurgitation.     Pulmonic Valve  The pulmonic valve is not well  visualized.     Vessels  The aortic root is normal size.     Pericardial/Pleural  There is no pericardial effusion.     ______________________________________________________________________________  MMode/2D Measurements & Calculations  EF Biplane: 53.6 %     Doppler Measurements & Calculations  AI P1/2t: 163.0 msec     ______________________________________________________________________________            AR with NCC Prolapse     Report approved by: Almaz Andujar 03/28/2024 11:40 AM          CTA Chest/Coronary CT:    Related Results     Radiologist Consult For Cardiology Final result 4/8/2024 11:02 AM          Narrative   RADIOLOGIST CONSULT FOR CARDIOLOGY 4/8/2024 11:02 AM    HISTORY: Aortic valve insufficiency, etiology of cardiac valve disease  unspecified    COMPARISON: None.   Impression   IMPRESSION: See cardiologist report for cardiovascular findings. The  lungs are clear. No significant extra-cardiovascular findings.    NAKIA SANZ MD        SYSTEM ID:  [id number]          CT Angiogram coronary artery Final result 4/8/2024 11:02 AM      Narrative   Procedure: CTA ANGIOGRAM CORONARY ARTERY  Examination Date: 4/8/2024 11:02 AM    Indication:  Aortic valve insufficiency. Preoperative evaluation..    Clinical Information: Aortic valve insufficiency, etiology of cardiac  valve disease unspecified  Ordering Physician: Nehemiah Kat    Overall quality of the study: Fair.. The distal portions of some of ...              Study Result    Narrative & Impression   Procedure: CTA CHEST WITH CONTRAST      Indication: Aortic valve insufficiency preop evaluation. Bicuspid  aortic valve suspected.      Examination Date: 4/8/2024 11:02 AM      Ordering Physician: Nehemiah Kat     TECHNIQUE: The patient was positioned in the scanner gantry and an IV  was started using an 18 gauge IV in the right antecubital fossa.  Utilizing 120 cc  Isovue 370, (wasted 0 cc), multi-slice computed  tomography was performed with a  Siemens Dual Source Flash scanner  without incident.      The total radiation exposure was calculated to be 844 DLP and 11.82  mSv, The angiogram was performed in the Flash mode with care dose at  120 kVp. Images were reconstructed and analyzed on a [employer]  Workstation. Scan protocol was optimized to minimize radiation  exposure.      FINDINGS:      The ascending thoracic aorta, aortic arch, and the descending  thoracoabdominal aorta demonstrate no evidence of  intramural hematoma  or dissection. There is a bovine arch with the innominate artery and  the left common carotid artery coming from the same ostium.. Great  vessels origin are widely patent.     The aortic root is normal dimension measuring 3.41 x 3.82 cm. The  number of aortic valve leaflets cannot be determined on this study.  Sinotubular junction is normal in dimension measuring 2.87 x 2.95 cm.  The mid ascending aorta at the level of the right pulmonary artery is  normal in dimension measuring 2.92 x 3.00 cm. The distal ascending  aorta before the takeoff of the innominate artery is normal in  dimension measuring 2.81 x 2.79 cm. The aortic arch is normal  dimension measuring 2.44 x 2.31 cm. The proximal descending thoracic  aorta is normal in dimension measuring 2.57 x 2.28 cm. The mid  descending thoracic aorta is normal in dimension measuring 2.13 x 2.02  cm and the distal descending thoracic aorta is normal in dimension  measuring 2.28 x 2.32 cm.        IMPRESSIONS: The aortic root, ascending aorta, aortic arch and  descending thoracic aorta are normal in dimension.     Radiology review for noncardiac findings will follow in a separate  report.        LALITHA CRUZ MD            Interpretation Summary    Procedure: CTA ANGIOGRAM CORONARY ARTERY   Examination Date: 4/8/2024 11:02 AM      Indication:  Aortic valve insufficiency. Preoperative evaluation..      Clinical Information: Aortic valve insufficiency, etiology of cardiac  valve disease  unspecified   Ordering Physician: Nehemiah Kat     Overall quality of the study: Fair.. The distal portions of some of  the vessels were not well seen probably due to patient body habitus.     PROCEDURE:The patient was positioned in the scanner gantry and an IV  was started using an 18 gauge IV in the right antecubital fossa.   Utilizing 120 cc  Isovue 370, wasted 0 cc, multi-slice computed  tomography was performed with a Siemens Dual Source Flash scanner  without incident. Beta-blockers were required to optimize heart rate,  patient was given Metoprolol 50 mg Oral, Metoprolol 10 mg  IV,  ivabradine 10 mg. The patient was given pre-medication of sublingual  Nitrostat 0.4 mg prior to scanning. CTA was performed in the spiral  mode at a heart rate of 63 bpm with 120 kVp. Images were reconstructed  and analyzed on a [employer] workstation. Scan protocol was optimized  to minimize radiation exposure. The total radiation exposure was  calculated to be 844 DLP, and 11.82 mSv.        FINDINGS:     CORONARY CALCIUM SCORE: The total Agatston calcium score is 0, Left  main: 0, left anterior descendin,  circumflex: 0, right coronary  artery: 0.         CORONARY ANGIOGRAPHY :   1. Probably angiographically normal coronary arteries. [The distal  portions of the LAD, second diagonal artery and second obtuse marginal  artery was difficult to assess for stenosis but are patent].  2. Aortic valve on this study appears to be tricuspid.     CORONARY ANGIOGRAPHY     DOMINANCE: Right dominant system.      LEFT MAIN:      The left main arises from the left coronary cusp.      The left main is widely patent without any stenosis or plaque.         LEFT ANTERIOR DESCENDING ARTERY:      The proximal left anterior descending artery and the mid left anterior  descending artery are widely patent without stenosis or plaque. The  distal left anterior descending artery also appears to be patent. The  distal left anterior descending artery  small in caliber and was  somewhat difficult to assess for stenosis but is clearly patent the  first diagonal artery appears to be patent without obvious stenosis or  plaque again the distal portion of this vessel was not well seen but  is clearly patent. The second diagonal artery also in its proximal to  midportion appear to be widely patent without stenosis or plaque. The  distal portion of the vessel is difficult to assess for stenosis but  is patent.     LEFT CIRCUMFLEX ARTERY:      There is a small caliber ramus intermedius which is patent. The  proximal mid and distal circumflex appears to be widely patent without  stenosis or plaque. The first diagonal artery is a sizable vessel  which appears to be patent without obvious stenosis or plaque. The  second obtuse marginal artery is quite distal and appears to be patent  without obvious stenosis or plaque. Again, the distal portion of this  vessel was difficult to assess for stenosis as it is small in caliber  but it does appear to be patent.        RIGHT CORONARY ARTERY:     The proximal, mid and distal right coronary artery appears to be  widely patent without stenosis or plaque. The posterior descending  artery and the posterolateral artery appears to be widely patent  without stenosis or plaque.      ADDITIONAL FINDINGS:      The separate aortic study performed today describes the aortic anatomy  in detail. What is clear on this study, however,  is that the aortic  valve is trileaflet and not bicuspid.     Normal pulmonary venous anatomy with all four pulmonary veins draining  into the left atrium.       There is no left ventricular mass or thrombus.      Normal pericardial thickness. There is no pericardial effusion.     The proximal pulmonary arteries are well opacified.     Please review Radiology report for incidental noncardiac findings that  will follow separately.           LALITHA CRUZ MD          Cardiac MRI 4/8/24:     Study  Result    Narrative & Impression                                                    [facility]                                                      CMR Report       MRN:                [id number]                                  Name:        YOAN NAYAK                                  :               1985-Dec-21                                  Scan Date:                                            Electronically signed by Simon Bhandari  16:41:53     SUMMARY   ==========================================================================================================     Clinical history: Aortic regurgitation, assess etiology and severity.   Comparison CMR: None     1. The LV is severely dilated with normal wall thickness. The global systolic function is mildly reduced.  The LVEF is 53%. There is mild global hypokinesis of the left ventricle.      2. The RV is normal in cavity size. The global systolic function is low normal. The RVEF is 50%.      3. Both atria are normal in size.     4. The aortic valve is trileaflet. There is severe aortic regurgitation (regurgitant volume of 105 ml,  corresponding to a regurgitant fraction of 46%).  The jet appears to originate from a region of  malcoaptation between the non-coronary and left coronary cusps.  Diastolic flow reversal is noted within  the descending thoracic aorta.      5. Late gadolinium enhancement imaging shows no MI, fibrosis or infiltrative disease.      CONCLUSIONS:      1. Severely dilated left ventricle with mildly reduced systolic function.     2. Trileaflet aortic valve with severe aortic regurgitation. The jet appears to originate from a region of  malcoaptation between the non-coronary and left coronary cusps.      3. No late enhancement to suggest prior infarct, inflammation or infiltration.      CORE EXAM   ==========================================================================================================      MEASUREMENTS   ----------------------------------------------------------------------------------------      VOLUMETRIC ANALYSIS       ----------------------------------------------  .-----------------------------------------------------------.                   LV     Reference   RV     Reference    +-----+-----------+-------+------------+-------+------------+   EDV  ml         427.6   (121-204)  146.5   (121-221)         ml/m^2     193.6   ()    66.3   ()     ESV  ml         198.7   (33-78)     73.0   (34-94)           ml/m^2      90.0   (18-39)     33.1   (18-47)      CO   L/min      14.87               4.78                     L/min/m^2   6.74               2.16                SV   ml         228.8   ()    73.5   ()          ml/m^2     103.6   (43-67)     33.3   (39-71)      EF   %           53.5   (57-75)     50.2   (50-76)     '-----+-----------+-------+------------+-------+------------'             CARDIAC OUTPUT HR:  65 BPM      LV DIMENSIONS       ----------------------------------------------          WALL THICKNESS - ANTEROSEPTAL:  1.1 cm          WALL THICKNESS - INFEROLATERAL:  1.0 cm          LV KENA:  7.3 cm          LV ESD:  5.6 cm        ANATOMY   ----------------------------------------------------------------------------------------      LEFT ATRIUM       ----------------------------------------------          CAVITY SIZE:  Normal         RIGHT ATRIUM       ----------------------------------------------          CAVITY SIZE:  Normal         PERICARDIUM       ----------------------------------------------          EFFUSION:  None        VALVES   ----------------------------------------------------------------------------------------      AORTIC VALVE       ----------------------------------------------          AORTIC VALVE LEAFLETS:  BICUSPID           SCAN INFO    ==========================================================================================================     GENERAL   ----------------------------------------------------------------------------------------      CONTRAST AGENT       ----------------------------------------------          TYPE:  Gadavist          GD CONCENTRATION:  0.5 M          VOLUME ADMINISTERED:  14 ml          DOSAGE:  0.06 mmol/kg         VITALS       ----------------------------------------------          HEIGHT:  67.0 in          HEIGHT:  170.2 cm          WEIGHT:  246.0 lbs          WEIGHT:  111.6 kgs          BSA:  2.21 m^2         PULSE SEQUENCES       ----------------------------------------------          SSFP cine, 2D LGE segmented, 2D LGE single-shot, Phase contrast imaging, [facility]          morphology          SETUP       ----------------------------------------------          SCAN TYPE:  Clinical          PATIENT TYPE:  Outpatient          INCOMPLETE SCAN:  No          REASON(S) FOR SCAN:  Eval native valve(s), Bicuspid AV (known/suspect)           REFERRING PHYSICIAN:  FERNANDO TREVINO          ATTENDING PHYSICIAN:  FERNANDO TREVINO

## 2024-04-17 ENCOUNTER — TELEPHONE (OUTPATIENT)
Dept: CARDIOLOGY | Facility: CLINIC | Age: 39
End: 2024-04-17
Payer: COMMERCIAL

## 2024-04-17 ENCOUNTER — PREP FOR PROCEDURE (OUTPATIENT)
Dept: CARDIOLOGY | Facility: CLINIC | Age: 39
End: 2024-04-17
Payer: COMMERCIAL

## 2024-04-17 DIAGNOSIS — I35.0 AS (AORTIC STENOSIS): Primary | ICD-10-CM

## 2024-04-17 NOTE — TELEPHONE ENCOUNTER
Per task, pt needs to schedule surgery with Dr. Kat at [facility]. LM asking pt to call back to schedule. Will try again later

## 2024-04-18 DIAGNOSIS — I35.1 AORTIC VALVE INSUFFICIENCY, ETIOLOGY OF CARDIAC VALVE DISEASE UNSPECIFIED: Primary | ICD-10-CM

## 2024-04-18 RX ORDER — CEFAZOLIN SODIUM 2 G/100ML
2 INJECTION, SOLUTION INTRAVENOUS SEE ADMIN INSTRUCTIONS
Status: CANCELLED | OUTPATIENT
Start: 2024-04-18

## 2024-04-18 RX ORDER — PHENYLEPHRINE HCL IN 0.9% NACL 50MG/250ML
.1-6 PLASTIC BAG, INJECTION (ML) INTRAVENOUS CONTINUOUS
Status: CANCELLED | OUTPATIENT
Start: 2024-06-11

## 2024-04-18 RX ORDER — CHLORHEXIDINE GLUCONATE ORAL RINSE 1.2 MG/ML
10 SOLUTION DENTAL ONCE
Status: CANCELLED | OUTPATIENT
Start: 2024-04-18 | End: 2024-04-18

## 2024-04-18 RX ORDER — GABAPENTIN 300 MG/1
300 CAPSULE ORAL
Status: CANCELLED | OUTPATIENT
Start: 2024-04-18

## 2024-04-18 RX ORDER — CEFAZOLIN SODIUM 2 G/100ML
2 INJECTION, SOLUTION INTRAVENOUS
Status: CANCELLED | OUTPATIENT
Start: 2024-04-18

## 2024-04-18 RX ORDER — NOREPINEPHRINE BITARTRATE 0.06 MG/ML
.01-.1 INJECTION, SOLUTION INTRAVENOUS CONTINUOUS
Status: CANCELLED | OUTPATIENT
Start: 2024-06-11

## 2024-04-18 RX ORDER — FAMOTIDINE 20 MG/1
20 TABLET, FILM COATED ORAL
Status: CANCELLED | OUTPATIENT
Start: 2024-04-18

## 2024-04-18 RX ORDER — ASPIRIN 81 MG/1
81 TABLET, CHEWABLE ORAL
Status: CANCELLED | OUTPATIENT
Start: 2024-04-18

## 2024-04-18 RX ORDER — ASPIRIN 81 MG/1
162 TABLET, CHEWABLE ORAL
Status: CANCELLED | OUTPATIENT
Start: 2024-04-18

## 2024-04-18 RX ORDER — LIDOCAINE 40 MG/G
CREAM TOPICAL
Status: CANCELLED | OUTPATIENT
Start: 2024-04-18

## 2024-04-18 RX ORDER — DEXMEDETOMIDINE HYDROCHLORIDE 4 UG/ML
.1-1.2 INJECTION, SOLUTION INTRAVENOUS CONTINUOUS
Status: CANCELLED | OUTPATIENT
Start: 2024-06-11

## 2024-04-18 RX ORDER — ACETAMINOPHEN 325 MG/1
975 TABLET ORAL ONCE
Status: CANCELLED | OUTPATIENT
Start: 2024-04-18 | End: 2024-04-18

## 2024-04-24 ENCOUNTER — TELEPHONE (OUTPATIENT)
Dept: CARDIOLOGY | Facility: CLINIC | Age: 39
End: 2024-04-24
Payer: COMMERCIAL

## 2024-04-24 NOTE — TELEPHONE ENCOUNTER
RNCC spoke with patient to answer questions about STD, pre authorization, and pre op H&P. All questions answered and verified understanding.

## 2024-05-19 ENCOUNTER — HEALTH MAINTENANCE LETTER (OUTPATIENT)
Age: 39
End: 2024-05-19

## 2024-05-27 LAB
ABO/RH(D): NORMAL
ANTIBODY SCREEN: NEGATIVE
SPECIMEN EXPIRATION DATE: NORMAL

## 2024-05-28 ENCOUNTER — ANESTHESIA EVENT (OUTPATIENT)
Dept: SURGERY | Facility: CLINIC | Age: 39
End: 2024-05-28
Payer: COMMERCIAL

## 2024-05-28 ENCOUNTER — PRE VISIT (OUTPATIENT)
Dept: SURGERY | Facility: CLINIC | Age: 39
End: 2024-05-28

## 2024-05-28 ENCOUNTER — OFFICE VISIT (OUTPATIENT)
Dept: SURGERY | Facility: CLINIC | Age: 39
End: 2024-05-28
Payer: COMMERCIAL

## 2024-05-28 ENCOUNTER — LAB (OUTPATIENT)
Dept: LAB | Facility: CLINIC | Age: 39
End: 2024-05-28
Payer: COMMERCIAL

## 2024-05-28 VITALS
HEART RATE: 86 BPM | BODY MASS INDEX: 35.1 KG/M2 | SYSTOLIC BLOOD PRESSURE: 114 MMHG | HEIGHT: 69 IN | RESPIRATION RATE: 16 BRPM | OXYGEN SATURATION: 98 % | DIASTOLIC BLOOD PRESSURE: 66 MMHG | WEIGHT: 237 LBS | TEMPERATURE: 98 F

## 2024-05-28 DIAGNOSIS — I35.1 AORTIC VALVE INSUFFICIENCY, ETIOLOGY OF CARDIAC VALVE DISEASE UNSPECIFIED: ICD-10-CM

## 2024-05-28 DIAGNOSIS — Z01.818 PRE-OP EVALUATION: Primary | ICD-10-CM

## 2024-05-28 LAB
ALBUMIN SERPL BCG-MCNC: 4.2 G/DL (ref 3.5–5.2)
ALBUMIN UR-MCNC: NEGATIVE MG/DL
ALP SERPL-CCNC: 65 U/L (ref 40–150)
ALT SERPL W P-5'-P-CCNC: 10 U/L (ref 0–70)
ANION GAP SERPL CALCULATED.3IONS-SCNC: 9 MMOL/L (ref 7–15)
APPEARANCE UR: CLEAR
AST SERPL W P-5'-P-CCNC: 18 U/L (ref 0–45)
BILIRUB SERPL-MCNC: 0.5 MG/DL
BILIRUB UR QL STRIP: NEGATIVE
BUN SERPL-MCNC: 12.5 MG/DL (ref 6–20)
CALCIUM SERPL-MCNC: 9 MG/DL (ref 8.6–10)
CHLORIDE SERPL-SCNC: 105 MMOL/L (ref 98–107)
COLOR UR AUTO: YELLOW
CREAT SERPL-MCNC: 0.78 MG/DL (ref 0.67–1.17)
DEPRECATED HCO3 PLAS-SCNC: 26 MMOL/L (ref 22–29)
EGFRCR SERPLBLD CKD-EPI 2021: >90 ML/MIN/1.73M2
ERYTHROCYTE [DISTWIDTH] IN BLOOD BY AUTOMATED COUNT: 13.5 % (ref 10–15)
GLUCOSE SERPL-MCNC: 87 MG/DL (ref 70–99)
GLUCOSE UR STRIP-MCNC: NEGATIVE MG/DL
HBA1C MFR BLD: 5.2 %
HCT VFR BLD AUTO: 42.2 % (ref 40–53)
HGB BLD-MCNC: 14.6 G/DL (ref 13.3–17.7)
HGB UR QL STRIP: NEGATIVE
KETONES UR STRIP-MCNC: NEGATIVE MG/DL
LEUKOCYTE ESTERASE UR QL STRIP: NEGATIVE
MCH RBC QN AUTO: 29.7 PG (ref 26.5–33)
MCHC RBC AUTO-ENTMCNC: 34.6 G/DL (ref 31.5–36.5)
MCV RBC AUTO: 86 FL (ref 78–100)
NITRATE UR QL: NEGATIVE
PH UR STRIP: 5.5 [PH] (ref 5–7)
PLATELET # BLD AUTO: 194 10E3/UL (ref 150–450)
POTASSIUM SERPL-SCNC: 4.1 MMOL/L (ref 3.4–5.3)
PROT SERPL-MCNC: 6.6 G/DL (ref 6.4–8.3)
RBC # BLD AUTO: 4.91 10E6/UL (ref 4.4–5.9)
SODIUM SERPL-SCNC: 140 MMOL/L (ref 135–145)
SP GR UR STRIP: 1.03 (ref 1–1.03)
UROBILINOGEN UR STRIP-MCNC: NORMAL MG/DL
WBC # BLD AUTO: 5.5 10E3/UL (ref 4–11)

## 2024-05-28 PROCEDURE — 99000 SPECIMEN HANDLING OFFICE-LAB: CPT | Performed by: PATHOLOGY

## 2024-05-28 PROCEDURE — 85027 COMPLETE CBC AUTOMATED: CPT | Performed by: PATHOLOGY

## 2024-05-28 PROCEDURE — 36415 COLL VENOUS BLD VENIPUNCTURE: CPT | Performed by: PATHOLOGY

## 2024-05-28 PROCEDURE — 99203 OFFICE O/P NEW LOW 30 MIN: CPT | Performed by: NURSE PRACTITIONER

## 2024-05-28 PROCEDURE — 80053 COMPREHEN METABOLIC PANEL: CPT | Performed by: PATHOLOGY

## 2024-05-28 PROCEDURE — 86900 BLOOD TYPING SEROLOGIC ABO: CPT

## 2024-05-28 PROCEDURE — 81003 URINALYSIS AUTO W/O SCOPE: CPT | Performed by: PATHOLOGY

## 2024-05-28 PROCEDURE — 83036 HEMOGLOBIN GLYCOSYLATED A1C: CPT | Performed by: SURGERY

## 2024-05-28 RX ORDER — FLUTICASONE PROPIONATE 50 MCG
1 SPRAY, SUSPENSION (ML) NASAL 2 TIMES DAILY PRN
COMMUNITY

## 2024-05-28 ASSESSMENT — PAIN SCALES - GENERAL: PAINLEVEL: NO PAIN (0)

## 2024-05-28 ASSESSMENT — LIFESTYLE VARIABLES: TOBACCO_USE: 1

## 2024-05-28 NOTE — H&P
Pre-Operative H & P     CC:  Preoperative exam to assess for increased cardiopulmonary risk while undergoing surgery and anesthesia.    Date of Encounter: 5/28/2024  Primary Care Physician:  Bisi Mccollum     Reason for visit: Pre-operative evaluation      HPI  Adam Garcia is a 38 year old male who presents for pre-operative H & P in preparation for      Procedure Information       Case: [id number] Date/Time: 06/11/24 0800    Procedure: AORTIC VALVE REPAIR POSSIBLE REPLACMENT AND ALL ASSOCIATED PROCEDURES,ECHOCARDIOGRAM, TRANSESOPHAGEAL, WITH ANESTHESIA (Heart)    Anesthesia type: General    Diagnosis:  [Severe aortic regurgitation    Pre-op diagnosis: Severe aortic regurgitation    Location: [facility]  /  OR    Providers: Nehemiah Kat MD          Adam Garcia is a 38-year-old male with PMH significant for obesity, ADHD, who was recently diagnosed with Severe aortic regurgitation. He was initially evaluated by his primary for increasing SOB over the past 8 - 10 months. Echocardiogram 3/2024 shows severe Aortic reguritation. He consulted with Dr. Kat and presents today in preparation for the above recommended procedure.      History is obtained from the patient and chart review    Hx of abnormal bleeding or anti-platelet use: denies      Past Medical History  Past Medical History:   Diagnosis Date    ADHD (attention deficit hyperactivity disorder)     Obesity        Past Surgical History  Past Surgical History:   Procedure Laterality Date    NO HISTORY OF SURGERY         Prior to Admission Medications  Current Outpatient Medications   Medication Sig Dispense Refill    aluminum chloride (DRYSOL) 20 % external solution       amphetamine-dextroamphetamine (ADDERALL) 30 MG tablet Take 30 mg by mouth 2 times daily      cetirizine (ZYRTEC) 5 MG tablet Take 5 mg by mouth every morning      clindamycin (CLEOCIN T) 1 % external solution Apply topically 2 times daily      fluticasone (FLONASE) 50  MCG/ACT nasal spray Spray 1 spray into both nostrils daily      ibuprofen (ADVIL/MOTRIN) 200 MG capsule Take 400 mg by mouth every 4 hours as needed for fever         Allergies  No Known Allergies    Social History  Social History     Socioeconomic History    Marital status:      Spouse name: Not on file    Number of children: Not on file    Years of education: Not on file    Highest education level: Not on file   Occupational History    Not on file   Tobacco Use    Smoking status: Former     Types: Cigarettes    Smokeless tobacco: Never   Substance and Sexual Activity    Alcohol use: Yes     Comment: 3-4 drinks week    Drug use: Yes     Types: Marijuana     Comment: will take THC edible 1-2x month    Sexual activity: Not on file   Other Topics Concern    Not on file   Social History Narrative    Not on file     Social Determinants of Health     Financial Resource Strain: Not on file   Food Insecurity: Not on file   Transportation Needs: Not on file   Physical Activity: Not on file   Stress: Not on file   Social Connections: Not on file   Interpersonal Safety: Not on file   Housing Stability: Not on file       Family History  Family History   Problem Relation Age of Onset    Valvular heart disease No family hx of        Review of Systems  The complete review of systems is negative other than noted in the HPI or here.   Anesthesia Evaluation   Pt has not had prior anesthetic         ROS/MED HX  ENT/Pulmonary: Comment: Patient reports apneic episodes     (+)     EVANGELINA risk factors, snores loudly,  obese,   allergic rhinitis,     tobacco use, Past use,                       Neurologic:  - neg neurologic ROS     Cardiovascular:     (+)  - -   -  - -                           valvular problems/murmurs type: AI severe AI.    Previous cardiac testing   Echo: Date: 3/2024 Results:    Stress Test:  Date: Results:    ECG Reviewed:  Date: 4/2024 Results:  Sinus rhythm.  Voltage criteria for LVH.  Cath:  Date: Results:    "(-) taking anticoagulants/antiplatelets and KHAN   METS/Exercise Tolerance: 3 - Able to walk 1-2 blocks without stopping    Hematologic:  - neg hematologic  ROS     Musculoskeletal:  - neg musculoskeletal ROS     GI/Hepatic:  - neg GI/hepatic ROS     Renal/Genitourinary:  - neg Renal ROS     Endo:     (+)               Obesity,       Psychiatric/Substance Use: Comment: ADHD     (+) psychiatric history anxiety and depression       Infectious Disease:  - neg infectious disease ROS     Malignancy:  - neg malignancy ROS     Other:            /66 (BP Location: Left arm, Patient Position: Sitting, Cuff Size: Adult Large)   Pulse 86   Temp 98  F (36.7  C) (Oral)   Resp 16   Ht 1.74 m (5' 8.5\")   Wt 107.5 kg (237 lb)   SpO2 98%   BMI 35.51 kg/m      Physical Exam   Constitutional: Awake, alert, cooperative, no apparent distress, and appears stated age.  Eyes: Pupils equal, round and reactive to light, extra ocular muscles intact, sclera clear, conjunctiva normal.  HENT: Normocephalic, oral pharynx with moist mucus membranes, good dentition. No goiter appreciated.   Respiratory: Clear to auscultation bilaterally, no crackles or wheezing.  Cardiovascular: Regular rate and rhythm, normal S1 and S2, and 3/6 murmur noted throughout precordium.  Carotids +2, no bruits. No edema. Palpable pulses to radial  DP and PT arteries.   GI: Normal bowel sounds, soft, non-distended, non-tender, no masses palpated, no hepatosplenomegaly.    Lymph/Hematologic: No cervical lymphadenopathy and no supraclavicular lymphadenopathy.  Genitourinary:  deferred  Skin: Warm and dry.  No rashes at anticipated surgical site.   Musculoskeletal: Full ROM of neck. There is no redness, warmth, or swelling of the joints. Gross motor strength is normal.    Neurologic: Awake, alert, oriented to name, place and time. Cranial nerves II-XII are grossly intact. Gait is normal.   Neuropsychiatric: Calm, cooperative. Normal affect.     Prior " Labs/Diagnostic Studies   All labs and imaging personally reviewed     EKG/ stress test - if available please see in ROS above   3/2024  Echo result w/o MOPS: Interpretation Summary 1. Severe (4+) aortic insufficiency (EROA 0.6 cm2,  cc, holodiastolicflow reversal in the descending aorta). No aortic stenosis.2. Aortic valve morphology is incompletely visualized but suspect bicuspidaortic valve.3. LV is mild-moderately dilated. LVEF is normal at 55-60%. No regional wallmotion abnormalities.4. Normal RV size and systolic function.5. LA is moderately dilated. RA is normal size.6. No other significant valvular abnormalities.7. Normal caliber of the aortic root and ascending aorta. No prior study available for comparison. Patient and referring physician will be contacted to arrange expeditedcardiology evaluation for severe aortic insufficiency.      MR Cardiac 4/8/2024  Clinical history: Aortic regurgitation, assess etiology and severity.  Comparison CMR: None     1. The LV is severely dilated with normal wall thickness. The global systolic function is mildly reduced. The LVEF is 53%. There is mild global hypokinesis of the left ventricle.    2. The RV is normal in cavity size. The global systolic function is low normal. The RVEF is 50%.   3. Both atria are normal in size.    4. The aortic valve is Tri- leaflet. There is severe aortic regurgitation (regurgitant volume of 105 ml, corresponding to a regurgitant fraction of 46%).  The jet appears to originate from a region of malcoaptation between the non-coronary and left coronary cusps.  Diastolic flow reversal is noted within the descending thoracic aorta.    5. Late gadolinium enhancement imaging shows no MI, fibrosis or infiltrative disease.     CONCLUSIONS:   1. Severely dilated left ventricle with mildly reduced systolic function.   2. Trileaflet aortic valve with severe aortic regurgitation. The jet appears to originate from a region of malcoaptation between  the non-coronary and left coronary cusps.    3. No late enhancement to suggest prior infarct, inflammation or infiltration.        CT angiogram Coronary 2024  FINDINGS:    CORONARY CALCIUM SCORE: The total Agatston calcium score is 0, Left  main: 0, left anterior descendin,  circumflex: 0, right coronary  artery: 0.     CORONARY ANGIOGRAPHY :  1. Probably angiographically normal coronary arteries. [The distal  portions of the LAD, second diagonal artery and second obtuse marginal  artery was difficult to assess for stenosis but are patent].  2. Aortic valve on this study appears to be tricuspid.   ADDITIONAL FINDINGS:    The separate aortic study performed today describes the aortic anatomy  in detail. What is clear on this study, however,  is that the aortic  valve is trileaflet and not bicuspid.    Normal pulmonary venous anatomy with all four pulmonary veins draining  into the left atrium.   There is no left ventricular mass or thrombus.   Normal pericardial thickness. There is no pericardial effusion.    The proximal pulmonary arteries are well opacified.      YOHANNES 3/28/2024  Interpretation Summary   This was a transesophageal echocardiogram requested to evaluate aortic  insufficiency.   The left ventricle is mildly dilated visually. There is likely mild eccentric  hypertrophy.  Normal left ventricular systolic function. Visually estimated around 60%.  Normal RV size and systolic function.  The aortic valve appears to be functionally trileaflet with an extremely small  area of minimal effusion around the RCC NCC. Genetic testing for BAV spectrum  may still be useful.  However there is severe prolapse of the noncoronary leaflet of the aortic  valve leading to an eccentric jet of severe aortic insufficiency.  No other hemodynamically significant valve disease.  No left atrial appendage thrombus negative bubble study.     Outside records reviewed from: Care Everywhere    LAB/DIAGNOSTIC STUDIES TODAY:    Type  "and Screen     Assessment    Adam Garcia is a 38 year old male seen as a PAC referral for risk assessment and optimization for anesthesia.    Plan/Recommendations  Pt will be optimized for the proposed procedure.  See below for details on the assessment, risk, and preoperative recommendations    NEUROLOGY  - No history of TIA, CVA or seizure    -Post Op delirium risk factors:  No risk identified    ENT  - No current airway concerns.  Will need to be reassessed day of surgery.  Mallampati: II  TM: > 3    CARDIAC  - No history of CAD, Hypertension, and Afib  - Severe Aortic Regurgitation   - see above pre-operative cardiac testing for details.   - Mild KHAN with exertion  - patient reports occasional ankle edema.   Euvolemic on exam today-  - - METS (Metabolic Equivalents)METS 3       PULMONARY  - Obstructive Sleep Apnea  EVANGELINA risk with no formal diagnosis  EVANGELINA Medium Risk             Total Score: 4    EVANGELINA: Snores loudly    EVANGLEINA: Observed stopped breathing    EVANGELINA: BMI over 35 kg/m2    EVANGELINA: Male      -Allergic rhinitis uses flonase prn  Zyrtec daily    - Denies asthma or inhaler use  - Tobacco History    History   Smoking Status    Former    Types: Cigarettes   Smokeless Tobacco    Never       GI    PONV Medium Risk  Total Score: 2           1 AN PONV: Patient is not a current smoker    1 AN PONV: Intended Post Op Opioids        /RENAL  - Baseline Creatinine  WNL    ENDOCRINE    - BMI: Estimated body mass index is 35.51 kg/m  as calculated from the following:    Height as of this encounter: 1.74 m (5' 8.5\").    Weight as of this encounter: 107.5 kg (237 lb).  Obesity (BMI >30)  - No history of Diabetes Mellitus    HEME  VTE Low Risk 0.5%             Total Score: 2    VTE: Male      - No history of abnormal bleeding or antiplatelet use.      MSK  Patient is NOT Frail             Total Score: 0          PSYCH  - ADHD will hold Adderall DOS    Different anesthesia methods/types have been discussed with the patient, " but they are aware that the final plan will be decided by the assigned anesthesia provider on the date of service.  Patient was discussed with Dr Goodwin    The patient is optimized for their procedure. AVS with information on surgery time/arrival time, meds and NPO status given by nursing staff. No further diagnostic testing indicated.      On the day of service:     Prep time: 12 minutes  Visit time: 20 minutes  Documentation time: 10 minutes  ------------------------------------------  Total time: 42 minutes      CATHERINE Zhou CNP  Preoperative Assessment Center  [facility]  Clinic and Surgery Center  Phone: [phone]  Fax: [phone]

## 2024-05-28 NOTE — PATIENT INSTRUCTIONS
Preparing for Your Surgery      Name:  Adam Garcia   MRN:  [id number]   :  1985   Today's Date:  2024       Arriving for surgery:  Surgery date:  2024  Arrival time:  6:00 AM    Please come to:     Please come to:       [facility]    [address] [place]  [address]     The [facility] [place] Patient/Visitor Ramp is at [address]. Patients and visitors who self-park will receive the reduced hospital parking rate. If the Patient /Visitor Ramp is full, please follow the signs to the [facility] car park located at the main hospital entrance.       parking is available (24 hours/ 7 days a week)      Discounted parking pass options are available for patients and visitors. They can be purchased at the [employer] desk at the main hospital entrance.     -    Stop at the security desk and they will direct surgery patients to [facility] [phone]        - If you need directions, a wheelchair or an escort please stop at the Information/security desk in the lobby.     What can I eat or drink?  -  You may eat and drink normally up to 8 hours prior to arrival time. (Until 10:00 PM 6/10)  -  You may have clear liquids until 2 hours prior to arrival time. (Until 4:00 AM)    Examples of clear liquids:  Water  Clear broth  Juices (apple, white grape, white cranberry  and cider) without pulp  Noncarbonated, powder based beverages  (lemonade and Luis-Aid)  Sodas (Sprite, 7-Up, ginger ale and seltzer)  Coffee or tea (without milk or cream)  Gatorade    -  No Alcohol or cannabis products for at least 24 hours before surgery.     Which medicines can I take?    Hold Aspirin for 7 days before surgery.   Hold Multivitamins for 7 days before surgery.  Hold Supplements for 7 days before surgery.  **Hold Ibuprofen (Advil, Motrin) for 3 day(s) before surgery--unless  otherwise directed by surgeon.  Hold Naproxen (Aleve) for 4 days before surgery.    -  DO NOT take these medications the day of surgery:  Zyrtec  Topical creams  Adderall    -  PLEASE TAKE these medications the day of surgery:  Flonase if needed    How do I prepare myself?  - Please take 2 showers (one the night prior to surgery and one the morning of surgery) using Scrubcare or Hibiclens soap.    Use this soap only from the neck to your toes.     Leave the soap on your skin for one minute--then rinse thoroughly.      You may use your own shampoo and conditioner. No other hair products.   - Please remove all jewelry and body piercings.  - No lotions, deodorants or fragrance.  - No makeup or fingernail polish.   - Bring your ID and insurance card.    -If you use a CPAP machine, please bring the CPAP machine, tubing, and mask to hospital.    -If you have a Deep Brain Stimulator, Spinal Cord Stimulator, or any Neuro Stimulator device---you must bring the remote control to the hospital.      ALL PATIENTS GOING HOME THE SAME DAY OF SURGERY ARE REQUIRED TO HAVE A RESPONSIBLE ADULT TO DRIVE AND BE IN ATTENDANCE WITH THEM FOR 24 HOURS FOLLOWING SURGERY.    Covid testing policy as of 12/06/2022  Your surgeon will notify and schedule you for a COVID test if one is needed before surgery--please direct any questions or COVID symptoms to your surgeon      Questions or Concerns:    - For any questions regarding the day of surgery or your hospital stay, please contact the Pre Admission Nursing Office at [phone].       - If you have health changes between today and your surgery, please call your surgeon.       - For questions after surgery, please call your surgeons office.           Current Visitor Guidelines    You may have 2 visitors in the pre op area.    Visiting hours: 8 a.m. to 8:30 p.m.    Patients confirmed or suspected to have symptoms of COVID 19 or flu:     No visitors allowed for adult patients.   Children (under  age 18) can have 1 named visitor.     People who are sick or showing symptoms of COVID 19 or flu:    Are not allowed to visit patients--we can only make exceptions in special situations.       Please follow these guidelines for your visit:          Please maintain social distance          Masking is optional--however at times you may be asked to wear a mask for the safety of yourself and others     Clean your hands with alcohol hand . Do this when you arrive at and leave the building and patient room,    And again after you touch your mask or anything in the room.     Go directly to and from the room you are visiting.     Stay in the patient s room during your visit. Limit going to other places in the hospital as much as possible     Leave bags and jackets at home or in the car.     For everyone s health, please don t come and go during your visit. That includes for smoking   during your visit.

## 2024-06-10 NOTE — H&P
CV ICU H&P    ASSESSMENT:  Adam Garcia is a 38 year old male with PMH of tobacco use, ADHD, severe AR. Presents to [facility] for AVR by Dr. Kat on 6/11. Extubated in OR.     Signout:  - Easy airway, left radial A-line, RIJ MAC, no swan, hands free. CT Meds x2.   - Median sternotomy, replacement mechanical valve. V-wires, hasn't needed, shocked 4 times coming off.   - Fent 500 mcg, 1.5 mg Dilaudid, Versed 4 mg, ketamine 50 mg. Vanco 1500 mg, Ancef 2 g  - IVF 3L, Cell saver 530cc, UOP 550cc  - Reversed and extubated in OR, arrived on facemask 4L/min, no pressors or sedation.    Warfarin tomorrow night, no bridge with heparin      CO-MORBIDITIES:   Patient Active Problem List   Diagnosis    S/P AVR (aortic valve replacement)         PLAN:  Neuro/ pain/ sedation:  - Monitor neurological status. Notify the MD for any acute changes in exam.  #ADHD  - Hold PTA Adderall until extubated  #Acute postoperative pain  - Scheduled: Tylenol  - PRN: Tylenol, oxycodone, Dilaudid    Pulmonary care:   #Mechanical ventilation  Resp: 18  - Goal SpO2 > 92%  - Encourage IS q15-30 minutes when awake.  - Restart PTA Zyrtec and Flonase when able    Cardiovascular:    #Cardiogenic shock  #Severe AR s/p AVR  Pre CPBP: 45-50%, severe AR prolapse left coronary cusp, some prolapse non-coronary cusp.   Post CPBP: No AR, similar EF, no abnormalities.  - No pressors  - Goal MAP >65, SBP <140, monitor hemodynamics  - Hold Statin and BB   - ASA: start tomorrow    GI care / Nutrition:   - Bedside swallow eval, advance diet as tolerated  - PPI  - Bowel regimen: MiraLAX, senna    Renal / Fluids / Electrolytes:   BL creat appears to be ~ 0.8  - Strict I/O, daily weights, avoid/limit nephrotoxins  - Replete lytes PRN per protocol    Endocrine:    #Stress hyperglycemia  Preop A1c 5.2  - Insulin gtt  - Goal BG <180 for optimal healing    ID / Antibiotics:  #Stress induced leukocytosis  - To complete perioperative regimen  - Monitor fever curve, WBC,  and inflammatory markers as appropriate    Heme:     #Acute blood loss anemia  #Acute blood loss thrombocytopenia  No s/sx active bleeding  - CBC   - Hgb goal > 7.0  - Hgb stable at 11.8  - Warfarin tomorrow night, no heparin bridge     MSK / Skin:  #Sternotomy  #Surgical Incision  - Sternal precautions, postop incision management protocol  - PT/OT/CR    Prophylaxis:     - Mechanical DVT ppx  - Chemical DVT ppx: Start SQH and Warfarin tomorrow  - PPI    Lines / Tubes / Drains:  - RIJ CVC  - Arterial Line  - CTs x2 (Meds)  - Matthew    Disposition:  - CVICU      Patient seen, findings and plan discussed with CVICU staff and cardiothoracic surgeons and fellow.    Mike Ziegler MD  Anesthesiology Resident, CA-2, PGY-3        ====================================    HPI:   Adam Garcia is a 38 year old male with PMH of tobacco use, ADHD, severe AR. Presents to [facility] for AVR by Dr. Kat on 6/11. Presents to CVICU after being extubated in OR and on 4 L/min facemask. Doing well.      PAST MEDICAL HISTORY:   Past Medical History:   Diagnosis Date    ADHD (attention deficit hyperactivity disorder)     Obesity        PAST SURGICAL HISTORY:   Past Surgical History:   Procedure Laterality Date    NO HISTORY OF SURGERY         FAMILY HISTORY:   Family History   Problem Relation Age of Onset    Valvular heart disease No family hx of        SOCIAL HISTORY:   Social History     Tobacco Use    Smoking status: Former     Types: Cigarettes    Smokeless tobacco: Never   Substance Use Topics    Alcohol use: Yes     Comment: 3-4 drinks week         OBJECTIVE:  1. VITAL SIGNS:   Temp:  [36.9  C (98.4  F)] 36.9  C (98.4  F)  Pulse:  [83] 83  Resp:  [18] 18  BP: (131)/(59) 131/59  SpO2:  [97 %] 97 %    Resp: 18        2. INTAKE/ OUTPUT:   No intake/output data recorded.      3. PHYSICAL EXAMINATION:   General: no acute distress  Neuro: drowsy but oriented  Resp: non-labored  CV: S1, S2, RRR, no m/r/g   Abdomen: Soft, non-distended,  non-tender  Incisions: c/d/i  Extremities: warm and well perfused  CT: To suction, serosang output, no airleak or crepitus      4. INVESTIGATIONS:   Arterial Blood Gases   Recent Labs   Lab 06/11/24  1222 06/11/24  1128 06/11/24  1102 06/11/24  1022   PH 7.40 7.36 7.35 7.33*   PCO2 41 44 45 49*   PO2 296* 161* 309* 244*   HCO3 25 25 25 26     Complete Blood Count   Recent Labs   Lab 06/11/24  1222 06/11/24  1143 06/11/24  1128 06/11/24  1102   WBC  --  9.2  --   --    HGB 12.5* 11.8* 12.1* 13.0*   PLT  --  162  --   --      Basic Metabolic Panel  Recent Labs   Lab 06/11/24  1316 06/11/24  1222 06/11/24  1128 06/11/24  1102 06/11/24  1022   NA  --  139 138 139 140   POTASSIUM  --  4.3 4.5 4.8 4.7   * 115* 126* 137* 113*     Liver Function Tests  Recent Labs   Lab 06/11/24  1143   INR 1.38*     Pancreatic Enzymes  No lab results found in last 7 days.  Coagulation Profile  Recent Labs   Lab 06/11/24  1143   INR 1.38*   PTT 83*         5. RADIOLOGY:   Recent Results (from the past 24 hour(s))   YOHANNES with Report    Narrative    Behrens, Christopher J, MD     6/11/2024 12:39 PM  Perioperative YOHANNES Procedure Note    Staff -        Anesthesiologist:  Behrens, Christopher J, MD       Performed By: anesthesiologist  Preanesthesia Checklist:  Patient identified, IV assessed, risks and   benefits discussed, monitors and equipment assessed, procedure being   performed at surgeon's request and anesthesia consent obtained.    YOHANNES Probe Insertion    Probe Status PRE Insertion: NO obvious damage  Probe type:  Adult 3D  Bite block used:   Soft  Insertion Technique: Jaw Lift  Insertion complications: None obvious  Billing Report:YOHANNES report by Anesthesiologist (See Separate Report note)  Probe Status POST Removal: NO obvious damage    YOHANNES Report  General Procedure Information  Images for this study have been archived.  Modalities: 2D, 3D, CW Doppler, PW Doppler and Color flow mapping  Echocardiographic and Doppler  Measurements  Right Ventricle:  Cavity size normal.    Hypertrophy not present.     Thrombus not present.    Global function normal.     Left Ventricle:  Cavity size normal.    Hypertrophy not present.     Thrombus not present.   Global Function mildly impaired.       Ventricular Regional Function:  1- Basal Anteroseptal:  normal  2- Basal Anterior:  normal  3- Basal Anterolateral:  normal  4- Basal Inferolateral:  normal  5- Basal Inferior:  normal  6- Basal Inferoseptal:  normal  7- Mid Anteroseptal:  normal  8- Mid Anterior:  normal  9- Mid Anterolateral:  normal  10- Mid Inferolateral:  normal  11- Mid Inferior:  normal  12- Mid Inferoseptal:  normal  13- Apical Anterior:  normal  14- Apical Lateral:  normal  15- Apical Inferior:  normal  16- Apical Septal:  normal  17- [place]:  normal    Valves  Aortic Valve: Annulus normal.  Stenosis not present.  Regurgitation +4.    Leaflets normal.  Leaflet motions prolapsed.    Mitral Valve: Annulus normal.  Stenosis not present.  Regurgitation   absent.  Leaflets normal.  Leaflet motions normal.    Tricuspid Valve: Annulus normal.  Stenosis not present.  Regurgitation   absent.  Leaflets normal.  Leaflet motions normal.    Pulmonic Valve: Annulus normal.  Stenosis not present.  Regurgitation   absent.      Aorta: Ascending Aorta: Size normal.  Dissection not present.  Plaque   thickness less than 3 mm.  Mobile plaque not present.    Aortic Arch: Size normal.    Dissection not present.   Plaque thickness   less than 3 mm.   Mobile plaque not present.    Descending Aorta: Size normal.    Dissection not present.   Plaque   thickness less than 3 mm.   Mobile plaque not present.      Right Atrium:  Size normal.   Spontaneous echo contrast not present.     Thrombus not present.   Tumor not present.   Device not present.     Left Atrium: Size normal.  Spontaneous echo contrast not present.    Thrombus not present.  Tumor not present.  Device not present.    Left atrial appendage  normal.     Atrial Septum: Intra-atrial septal morphology normal.       Ventricular Septum: Intra-ventricular septum morphology normal.          Post Intervention Findings  Procedure(s) performed:  Aortic Valve Repair/Replace. Global function:    Unchanged. Regional wall motion: Unchanged. Surgeon(s) notified of all   postintervention findings: Yes.             Post Intervention comments: AV: Placement of new mechanical valve. Mean   gradient 9, Normal leaflet morphology and function, no regurgitation.   Washing jets present    Rest of exam not significantly changed compared to pre-op exam.  .    Echocardiogram Comments  Echocardiogram comments: LV: Normal ventricle size with normal global   function, EF 45-50%.   AV: Normal leaflet morphology with normal function. Severe regurgitation   from prolapse of left coronary cusp, there also appears to be some   prolapse of non-coronary cusp. PHT >500, holosystolic reversal of flow in   descending aorta  MV: Normal leaflet morphology and function. No stenosis suspected, no   regurgitation noted.  RV: Normal ventricular size with normal function. EF 45%  TV: Normal leaflet morphology and function. No stenosis suspected with   trace regurgitation noted.  PV:Normal leaflet morphology and function. No stenosis suspected, no   regurgitation noted.  Aorta: Diameter within normal limits. No evidence of aneurysm, hematoma or   dissection. Grade 0 atherosclerotic disease.  .       =========================================

## 2024-06-11 ENCOUNTER — APPOINTMENT (OUTPATIENT)
Dept: GENERAL RADIOLOGY | Facility: CLINIC | Age: 39
End: 2024-06-11
Attending: PHYSICIAN ASSISTANT
Payer: COMMERCIAL

## 2024-06-11 ENCOUNTER — HOSPITAL ENCOUNTER (INPATIENT)
Facility: CLINIC | Age: 39
LOS: 5 days | Discharge: HOME | End: 2024-06-16
Attending: SURGERY | Admitting: SURGERY
Payer: COMMERCIAL

## 2024-06-11 ENCOUNTER — ANESTHESIA (OUTPATIENT)
Dept: SURGERY | Facility: CLINIC | Age: 39
End: 2024-06-11
Payer: COMMERCIAL

## 2024-06-11 DIAGNOSIS — Z95.2 S/P AVR (AORTIC VALVE REPLACEMENT): Primary | ICD-10-CM

## 2024-06-11 LAB
ALBUMIN SERPL BCG-MCNC: 3.8 G/DL (ref 3.5–5.2)
ALLEN'S TEST: ABNORMAL
ALLEN'S TEST: ABNORMAL
ALP SERPL-CCNC: 57 U/L (ref 40–150)
ALT SERPL W P-5'-P-CCNC: 12 U/L (ref 0–70)
ANION GAP SERPL CALCULATED.3IONS-SCNC: 8 MMOL/L (ref 7–15)
APTT PPP: 110 SECONDS (ref 22–38)
APTT PPP: 83 SECONDS (ref 22–38)
AST SERPL W P-5'-P-CCNC: 27 U/L (ref 0–45)
BASE EXCESS BLDA CALC-SCNC: -0.4 MMOL/L (ref -3–3)
BASE EXCESS BLDA CALC-SCNC: -0.6 MMOL/L (ref -3–3)
BASE EXCESS BLDA CALC-SCNC: -0.7 MMOL/L (ref -3–3)
BASE EXCESS BLDA CALC-SCNC: -0.7 MMOL/L (ref -3–3)
BASE EXCESS BLDA CALC-SCNC: -0.9 MMOL/L (ref -3–3)
BASE EXCESS BLDA CALC-SCNC: -2.9 MMOL/L (ref -3–3)
BASE EXCESS BLDA CALC-SCNC: 0.2 MMOL/L (ref -3–3)
BASE EXCESS BLDA CALC-SCNC: 1.1 MMOL/L (ref -3–3)
BASE EXCESS BLDV CALC-SCNC: 0.3 MMOL/L (ref -3–3)
BILIRUB SERPL-MCNC: 0.7 MG/DL
BLD PROD TYP BPU: NORMAL
BLD PROD TYP BPU: NORMAL
BLOOD COMPONENT TYPE: NORMAL
BLOOD COMPONENT TYPE: NORMAL
BUN SERPL-MCNC: 10.4 MG/DL (ref 6–20)
CA-I BLD-MCNC: 4.2 MG/DL (ref 4.4–5.2)
CA-I BLD-MCNC: 4.3 MG/DL (ref 4.4–5.2)
CA-I BLD-MCNC: 4.4 MG/DL (ref 4.4–5.2)
CA-I BLD-MCNC: 4.5 MG/DL (ref 4.4–5.2)
CA-I BLD-MCNC: 4.6 MG/DL (ref 4.4–5.2)
CA-I BLD-MCNC: 4.7 MG/DL (ref 4.4–5.2)
CA-I BLD-MCNC: 4.8 MG/DL (ref 4.4–5.2)
CA-I BLD-MCNC: 5.1 MG/DL (ref 4.4–5.2)
CALCIUM SERPL-MCNC: 8.1 MG/DL (ref 8.6–10)
CHLORIDE SERPL-SCNC: 107 MMOL/L (ref 98–107)
CLOT INIT KAOL IND TO POST HEP NEUT TRTO: 1 {RATIO}
CLOT INIT KAOL IND TO POST HEP NEUT TRTO: 1.1 {RATIO}
CLOT INIT KAOLIN IND BLD US: 131 SEC (ref 113–166)
CLOT INIT KAOLIN IND BLD US: 146 SEC (ref 113–166)
CLOT INIT KAOLIN IND P HEP NEUT BLD US: 120 SEC (ref 103–153)
CLOT INIT KAOLIN IND P HEP NEUT BLD US: 139 SEC (ref 103–153)
CLOT STIFF PLT CONT BLD CALC: 12.8 HPA (ref 11.9–29.8)
CLOT STIFF PLT CONT BLD CALC: 14.2 HPA (ref 11.9–29.8)
CLOT STIFF TF IND P HEP NEUT BLD US: 14.3 HPA (ref 13–33.2)
CLOT STIFF TF IND P HEP NEUT BLD US: 15.8 HPA (ref 13–33.2)
CLOT STIFF TF IND+IIB-IIIA INH P HEP NEU: 1.5 HPA (ref 1–3.7)
CLOT STIFF TF IND+IIB-IIIA INH P HEP NEU: 1.6 HPA (ref 1–3.7)
CODING SYSTEM: NORMAL
CODING SYSTEM: NORMAL
COHGB MFR BLD: 100 % (ref 96–97)
COHGB MFR BLD: 99.3 % (ref 96–97)
CREAT SERPL-MCNC: 0.84 MG/DL (ref 0.67–1.17)
CROSSMATCH: NORMAL
CROSSMATCH: NORMAL
DEPRECATED HCO3 PLAS-SCNC: 24 MMOL/L (ref 22–29)
EGFRCR SERPLBLD CKD-EPI 2021: >90 ML/MIN/1.73M2
ERYTHROCYTE [DISTWIDTH] IN BLOOD BY AUTOMATED COUNT: 13.3 % (ref 10–15)
ERYTHROCYTE [DISTWIDTH] IN BLOOD BY AUTOMATED COUNT: 13.3 % (ref 10–15)
FIBRINOGEN PPP-MCNC: 221 MG/DL (ref 170–490)
GLUCOSE BLD-MCNC: 100 MG/DL (ref 70–99)
GLUCOSE BLD-MCNC: 110 MG/DL (ref 70–99)
GLUCOSE BLD-MCNC: 113 MG/DL (ref 70–99)
GLUCOSE BLD-MCNC: 115 MG/DL (ref 70–99)
GLUCOSE BLD-MCNC: 126 MG/DL (ref 70–99)
GLUCOSE BLD-MCNC: 137 MG/DL (ref 70–99)
GLUCOSE BLD-MCNC: 96 MG/DL (ref 70–99)
GLUCOSE BLDC GLUCOMTR-MCNC: 129 MG/DL (ref 70–99)
GLUCOSE BLDC GLUCOMTR-MCNC: 153 MG/DL (ref 70–99)
GLUCOSE BLDC GLUCOMTR-MCNC: 172 MG/DL (ref 70–99)
GLUCOSE BLDC GLUCOMTR-MCNC: 94 MG/DL (ref 70–99)
GLUCOSE SERPL-MCNC: 137 MG/DL (ref 70–99)
HCO3 BLD-SCNC: 23 MMOL/L (ref 21–28)
HCO3 BLD-SCNC: 27 MMOL/L (ref 21–28)
HCO3 BLDA-SCNC: 24 MMOL/L (ref 21–28)
HCO3 BLDA-SCNC: 25 MMOL/L (ref 21–28)
HCO3 BLDA-SCNC: 26 MMOL/L (ref 21–28)
HCO3 BLDA-SCNC: 26 MMOL/L (ref 21–28)
HCO3 BLDV-SCNC: 27 MMOL/L (ref 21–28)
HCT VFR BLD AUTO: 33 % (ref 40–53)
HCT VFR BLD AUTO: 36.3 % (ref 40–53)
HGB BLD-MCNC: 11.8 G/DL (ref 13.3–17.7)
HGB BLD-MCNC: 12.1 G/DL (ref 13.3–17.7)
HGB BLD-MCNC: 12.3 G/DL (ref 13.3–17.7)
HGB BLD-MCNC: 12.5 G/DL (ref 13.3–17.7)
HGB BLD-MCNC: 12.5 G/DL (ref 13.3–17.7)
HGB BLD-MCNC: 12.9 G/DL (ref 13.3–17.7)
HGB BLD-MCNC: 12.9 G/DL (ref 13.3–17.7)
HGB BLD-MCNC: 13 G/DL (ref 13.3–17.7)
HGB BLD-MCNC: 13.2 G/DL (ref 13.3–17.7)
INR PPP: 1.25 (ref 0.85–1.15)
INR PPP: 1.38 (ref 0.85–1.15)
LACTATE BLD-SCNC: 0.5 MMOL/L (ref 0.7–2)
LACTATE BLD-SCNC: 0.6 MMOL/L (ref 0.7–2)
LACTATE BLD-SCNC: 0.7 MMOL/L (ref 0.7–2)
LACTATE BLD-SCNC: 0.7 MMOL/L (ref 0.7–2)
LACTATE BLD-SCNC: 0.8 MMOL/L (ref 0.7–2)
LACTATE SERPL-SCNC: 0.8 MMOL/L (ref 0.7–2)
MAGNESIUM SERPL-MCNC: 2.5 MG/DL (ref 1.7–2.3)
MCH RBC QN AUTO: 30.4 PG (ref 26.5–33)
MCH RBC QN AUTO: 30.6 PG (ref 26.5–33)
MCHC RBC AUTO-ENTMCNC: 35.8 G/DL (ref 31.5–36.5)
MCHC RBC AUTO-ENTMCNC: 36.4 G/DL (ref 31.5–36.5)
MCV RBC AUTO: 84 FL (ref 78–100)
MCV RBC AUTO: 85 FL (ref 78–100)
O2/TOTAL GAS SETTING VFR VENT: 100 %
O2/TOTAL GAS SETTING VFR VENT: 100 %
O2/TOTAL GAS SETTING VFR VENT: 27 %
O2/TOTAL GAS SETTING VFR VENT: 36 %
O2/TOTAL GAS SETTING VFR VENT: 60 %
O2/TOTAL GAS SETTING VFR VENT: 60 %
O2/TOTAL GAS SETTING VFR VENT: 70 %
O2/TOTAL GAS SETTING VFR VENT: 70 %
O2/TOTAL GAS SETTING VFR VENT: 80 %
OXYHGB MFR BLDA: 98 % (ref 92–100)
OXYHGB MFR BLDA: 98 % (ref 92–100)
OXYHGB MFR BLDA: 99 % (ref 92–100)
OXYHGB MFR BLDV: 79 % (ref 70–75)
PCO2 BLD: 45 MM HG (ref 35–45)
PCO2 BLD: 49 MM HG (ref 35–45)
PCO2 BLDA: 38 MM HG (ref 35–45)
PCO2 BLDA: 41 MM HG (ref 35–45)
PCO2 BLDA: 44 MM HG (ref 35–45)
PCO2 BLDA: 45 MM HG (ref 35–45)
PCO2 BLDA: 48 MM HG (ref 35–45)
PCO2 BLDA: 49 MM HG (ref 35–45)
PCO2 BLDV: 53 MM HG (ref 40–50)
PH BLD: 7.33 [PH] (ref 7.35–7.45)
PH BLD: 7.36 [PH] (ref 7.35–7.45)
PH BLDA: 7.33 [PH] (ref 7.35–7.45)
PH BLDA: 7.33 [PH] (ref 7.35–7.45)
PH BLDA: 7.35 [PH] (ref 7.35–7.45)
PH BLDA: 7.36 [PH] (ref 7.35–7.45)
PH BLDA: 7.4 [PH] (ref 7.35–7.45)
PH BLDA: 7.41 [PH] (ref 7.35–7.45)
PH BLDV: 7.32 [PH] (ref 7.32–7.43)
PHOSPHATE SERPL-MCNC: 3.4 MG/DL (ref 2.5–4.5)
PLATELET # BLD AUTO: 162 10E3/UL (ref 150–450)
PLATELET # BLD AUTO: 176 10E3/UL (ref 150–450)
PO2 BLD: 112 MM HG (ref 80–105)
PO2 BLD: 150 MM HG (ref 80–105)
PO2 BLDA: 129 MM HG (ref 80–105)
PO2 BLDA: 161 MM HG (ref 80–105)
PO2 BLDA: 242 MM HG (ref 80–105)
PO2 BLDA: 244 MM HG (ref 80–105)
PO2 BLDA: 296 MM HG (ref 80–105)
PO2 BLDA: 309 MM HG (ref 80–105)
PO2 BLDV: 46 MM HG (ref 25–47)
POTASSIUM BLD-SCNC: 3.6 MMOL/L (ref 3.4–5.3)
POTASSIUM BLD-SCNC: 3.9 MMOL/L (ref 3.4–5.3)
POTASSIUM BLD-SCNC: 4.3 MMOL/L (ref 3.4–5.3)
POTASSIUM BLD-SCNC: 4.5 MMOL/L (ref 3.4–5.3)
POTASSIUM BLD-SCNC: 4.7 MMOL/L (ref 3.4–5.3)
POTASSIUM BLD-SCNC: 4.8 MMOL/L (ref 3.4–5.3)
POTASSIUM BLD-SCNC: 4.8 MMOL/L (ref 3.4–5.3)
POTASSIUM SERPL-SCNC: 4.4 MMOL/L (ref 3.4–5.3)
PROT SERPL-MCNC: 5.6 G/DL (ref 6.4–8.3)
RBC # BLD AUTO: 3.88 10E6/UL (ref 4.4–5.9)
RBC # BLD AUTO: 4.31 10E6/UL (ref 4.4–5.9)
SAO2 % BLDA: 100 % (ref 96–97)
SAO2 % BLDA: 100 % (ref 96–97)
SAO2 % BLDA: 97 % (ref 92–100)
SAO2 % BLDA: 98 % (ref 92–100)
SAO2 % BLDA: 99 % (ref 96–97)
SAO2 % BLDA: >100 % (ref 96–97)
SAO2 % BLDV: 80 % (ref 70–75)
SODIUM BLD-SCNC: 138 MMOL/L (ref 135–145)
SODIUM BLD-SCNC: 139 MMOL/L (ref 135–145)
SODIUM BLD-SCNC: 140 MMOL/L (ref 135–145)
SODIUM SERPL-SCNC: 139 MMOL/L (ref 135–145)
UNIT ABO/RH: NORMAL
UNIT ABO/RH: NORMAL
UNIT NUMBER: NORMAL
UNIT NUMBER: NORMAL
UNIT STATUS: NORMAL
UNIT STATUS: NORMAL
UNIT TYPE ISBT: 9500
UNIT TYPE ISBT: 9500
WBC # BLD AUTO: 13.1 10E3/UL (ref 4–11)
WBC # BLD AUTO: 9.2 10E3/UL (ref 4–11)

## 2024-06-11 PROCEDURE — 360N000079 HC SURGERY LEVEL 6, PER MIN: Performed by: SURGERY

## 2024-06-11 PROCEDURE — 82805 BLOOD GASES W/O2 SATURATION: CPT | Performed by: SURGERY

## 2024-06-11 PROCEDURE — 410N000004: Performed by: SURGERY

## 2024-06-11 PROCEDURE — 250N000011 HC RX IP 250 OP 636: Performed by: SURGERY

## 2024-06-11 PROCEDURE — 85384 FIBRINOGEN ACTIVITY: CPT | Performed by: SURGERY

## 2024-06-11 PROCEDURE — 85730 THROMBOPLASTIN TIME PARTIAL: CPT | Performed by: PHYSICIAN ASSISTANT

## 2024-06-11 PROCEDURE — 250N000009 HC RX 250: Performed by: ANESTHESIOLOGY

## 2024-06-11 PROCEDURE — 82330 ASSAY OF CALCIUM: CPT

## 2024-06-11 PROCEDURE — 85048 AUTOMATED LEUKOCYTE COUNT: CPT | Performed by: SURGERY

## 2024-06-11 PROCEDURE — 84132 ASSAY OF SERUM POTASSIUM: CPT | Performed by: PHYSICIAN ASSISTANT

## 2024-06-11 PROCEDURE — 33405 REPLACEMENT AORTIC VALVE OPN: CPT | Performed by: SURGERY

## 2024-06-11 PROCEDURE — 250N000009 HC RX 250

## 2024-06-11 PROCEDURE — P9045 ALBUMIN (HUMAN), 5%, 250 ML: HCPCS

## 2024-06-11 PROCEDURE — 250N000011 HC RX IP 250 OP 636: Performed by: PHYSICIAN ASSISTANT

## 2024-06-11 PROCEDURE — 83735 ASSAY OF MAGNESIUM: CPT | Performed by: PHYSICIAN ASSISTANT

## 2024-06-11 PROCEDURE — 250N000013 HC RX MED GY IP 250 OP 250 PS 637: Performed by: PHYSICIAN ASSISTANT

## 2024-06-11 PROCEDURE — C1898 LEAD, PMKR, OTHER THAN TRANS: HCPCS | Performed by: SURGERY

## 2024-06-11 PROCEDURE — 5A1221Z PERFORMANCE OF CARDIAC OUTPUT, CONTINUOUS: ICD-10-PCS | Performed by: SURGERY

## 2024-06-11 PROCEDURE — 93005 ELECTROCARDIOGRAM TRACING: CPT

## 2024-06-11 PROCEDURE — C1781 MESH (IMPLANTABLE): HCPCS | Performed by: SURGERY

## 2024-06-11 PROCEDURE — 84100 ASSAY OF PHOSPHORUS: CPT | Performed by: PHYSICIAN ASSISTANT

## 2024-06-11 PROCEDURE — 83605 ASSAY OF LACTIC ACID: CPT | Performed by: PHYSICIAN ASSISTANT

## 2024-06-11 PROCEDURE — 3E043XZ INTRODUCTION OF VASOPRESSOR INTO CENTRAL VEIN, PERCUTANEOUS APPROACH: ICD-10-PCS | Performed by: SURGERY

## 2024-06-11 PROCEDURE — 82805 BLOOD GASES W/O2 SATURATION: CPT | Performed by: PHYSICIAN ASSISTANT

## 2024-06-11 PROCEDURE — 250N000009 HC RX 250: Performed by: SURGERY

## 2024-06-11 PROCEDURE — 410N000003 HC PER-PERFUSION 1ST 30 MIN: Performed by: SURGERY

## 2024-06-11 PROCEDURE — 85396 CLOTTING ASSAY WHOLE BLOOD: CPT

## 2024-06-11 PROCEDURE — 82330 ASSAY OF CALCIUM: CPT | Performed by: PHYSICIAN ASSISTANT

## 2024-06-11 PROCEDURE — 85014 HEMATOCRIT: CPT | Performed by: PHYSICIAN ASSISTANT

## 2024-06-11 PROCEDURE — 85730 THROMBOPLASTIN TIME PARTIAL: CPT | Performed by: SURGERY

## 2024-06-11 PROCEDURE — 272N000085 HC PACK CELL SAVER CSP: Performed by: SURGERY

## 2024-06-11 PROCEDURE — 250N000011 HC RX IP 250 OP 636: Performed by: ANESTHESIOLOGY

## 2024-06-11 PROCEDURE — 999N000141 HC STATISTIC PRE-PROCEDURE NURSING ASSESSMENT: Performed by: SURGERY

## 2024-06-11 PROCEDURE — 258N000003 HC RX IP 258 OP 636: Mod: JZ | Performed by: ANESTHESIOLOGY

## 2024-06-11 PROCEDURE — 258N000003 HC RX IP 258 OP 636: Mod: JZ | Performed by: PHYSICIAN ASSISTANT

## 2024-06-11 PROCEDURE — 258N000003 HC RX IP 258 OP 636: Mod: JZ | Performed by: SURGERY

## 2024-06-11 PROCEDURE — 272N000088 HC PUMP APP ADULT PERFUSION: Performed by: SURGERY

## 2024-06-11 PROCEDURE — 250N000013 HC RX MED GY IP 250 OP 250 PS 637: Performed by: SURGERY

## 2024-06-11 PROCEDURE — 200N000002 HC R&B ICU UMMC

## 2024-06-11 PROCEDURE — 86923 COMPATIBILITY TEST ELECTRIC: CPT

## 2024-06-11 PROCEDURE — 82805 BLOOD GASES W/O2 SATURATION: CPT

## 2024-06-11 PROCEDURE — 02RF0JZ REPLACEMENT OF AORTIC VALVE WITH SYNTHETIC SUBSTITUTE, OPEN APPROACH: ICD-10-PCS | Performed by: SURGERY

## 2024-06-11 PROCEDURE — 71045 X-RAY EXAM CHEST 1 VIEW: CPT | Mod: 26 | Performed by: RADIOLOGY

## 2024-06-11 PROCEDURE — 88305 TISSUE EXAM BY PATHOLOGIST: CPT | Mod: TC | Performed by: SURGERY

## 2024-06-11 PROCEDURE — 999N000065 XR CHEST PORT 1 VIEW

## 2024-06-11 PROCEDURE — 250N000024 HC ISOFLURANE, PER MIN: Performed by: SURGERY

## 2024-06-11 PROCEDURE — 250N000011 HC RX IP 250 OP 636

## 2024-06-11 PROCEDURE — 250N000011 HC RX IP 250 OP 636: Mod: JZ | Performed by: SURGERY

## 2024-06-11 PROCEDURE — 272N000001 HC OR GENERAL SUPPLY STERILE: Performed by: SURGERY

## 2024-06-11 PROCEDURE — 99291 CRITICAL CARE FIRST HOUR: CPT | Mod: GC | Performed by: SURGERY

## 2024-06-11 PROCEDURE — C1889 IMPLANT/INSERT DEVICE, NOC: HCPCS | Performed by: SURGERY

## 2024-06-11 PROCEDURE — 88305 TISSUE EXAM BY PATHOLOGIST: CPT | Mod: 26 | Performed by: STUDENT IN AN ORGANIZED HEALTH CARE EDUCATION/TRAINING PROGRAM

## 2024-06-11 PROCEDURE — 370N000017 HC ANESTHESIA TECHNICAL FEE, PER MIN: Performed by: SURGERY

## 2024-06-11 PROCEDURE — 85610 PROTHROMBIN TIME: CPT | Performed by: PHYSICIAN ASSISTANT

## 2024-06-11 PROCEDURE — 250N000013 HC RX MED GY IP 250 OP 250 PS 637: Performed by: STUDENT IN AN ORGANIZED HEALTH CARE EDUCATION/TRAINING PROGRAM

## 2024-06-11 PROCEDURE — 85610 PROTHROMBIN TIME: CPT | Performed by: SURGERY

## 2024-06-11 PROCEDURE — 250N000011 HC RX IP 250 OP 636: Mod: JZ | Performed by: STUDENT IN AN ORGANIZED HEALTH CARE EDUCATION/TRAINING PROGRAM

## 2024-06-11 DEVICE — IMPLANTABLE DEVICE: Type: IMPLANTABLE DEVICE | Site: HEART | Status: FUNCTIONAL

## 2024-06-11 RX ORDER — HYDRALAZINE HYDROCHLORIDE 20 MG/ML
10 INJECTION INTRAMUSCULAR; INTRAVENOUS EVERY 30 MIN PRN
Status: DISCONTINUED | OUTPATIENT
Start: 2024-06-11 | End: 2024-06-16 | Stop reason: HOSPADM

## 2024-06-11 RX ORDER — DEXMEDETOMIDINE HYDROCHLORIDE 4 UG/ML
.1-1.2 INJECTION, SOLUTION INTRAVENOUS CONTINUOUS
Status: DISCONTINUED | OUTPATIENT
Start: 2024-06-11 | End: 2024-06-11

## 2024-06-11 RX ORDER — OXYCODONE HYDROCHLORIDE 5 MG/1
5 TABLET ORAL EVERY 4 HOURS PRN
Status: DISCONTINUED | OUTPATIENT
Start: 2024-06-11 | End: 2024-06-16 | Stop reason: HOSPADM

## 2024-06-11 RX ORDER — DEXTROSE MONOHYDRATE 25 G/50ML
25-50 INJECTION, SOLUTION INTRAVENOUS
Status: DISCONTINUED | OUTPATIENT
Start: 2024-06-11 | End: 2024-06-16 | Stop reason: HOSPADM

## 2024-06-11 RX ORDER — ACETAMINOPHEN 325 MG/1
975 TABLET ORAL ONCE
Status: COMPLETED | OUTPATIENT
Start: 2024-06-11 | End: 2024-06-11

## 2024-06-11 RX ORDER — CEFAZOLIN SODIUM/WATER 2 G/20 ML
2 SYRINGE (ML) INTRAVENOUS SEE ADMIN INSTRUCTIONS
Status: DISCONTINUED | OUTPATIENT
Start: 2024-06-11 | End: 2024-06-11 | Stop reason: HOSPADM

## 2024-06-11 RX ORDER — AMOXICILLIN 250 MG
2 CAPSULE ORAL 2 TIMES DAILY
Status: DISCONTINUED | OUTPATIENT
Start: 2024-06-11 | End: 2024-06-15

## 2024-06-11 RX ORDER — PROPOFOL 10 MG/ML
INJECTION, EMULSION INTRAVENOUS PRN
Status: DISCONTINUED | OUTPATIENT
Start: 2024-06-11 | End: 2024-06-11

## 2024-06-11 RX ORDER — CEFAZOLIN SODIUM/WATER 2 G/20 ML
2 SYRINGE (ML) INTRAVENOUS
Status: COMPLETED | OUTPATIENT
Start: 2024-06-11 | End: 2024-06-11

## 2024-06-11 RX ORDER — NITROGLYCERIN 10 MG/100ML
INJECTION INTRAVENOUS PRN
Status: DISCONTINUED | OUTPATIENT
Start: 2024-06-11 | End: 2024-06-11

## 2024-06-11 RX ORDER — HYDROMORPHONE HYDROCHLORIDE 2 MG/1
2 TABLET ORAL EVERY 4 HOURS PRN
Status: CANCELLED | OUTPATIENT
Start: 2024-06-11

## 2024-06-11 RX ORDER — HYDROMORPHONE HYDROCHLORIDE 1 MG/ML
0.5 INJECTION, SOLUTION INTRAMUSCULAR; INTRAVENOUS; SUBCUTANEOUS
Status: DISCONTINUED | OUTPATIENT
Start: 2024-06-11 | End: 2024-06-14

## 2024-06-11 RX ORDER — KETAMINE HYDROCHLORIDE 10 MG/ML
INJECTION INTRAMUSCULAR; INTRAVENOUS PRN
Status: DISCONTINUED | OUTPATIENT
Start: 2024-06-11 | End: 2024-06-11

## 2024-06-11 RX ORDER — NALOXONE HYDROCHLORIDE 0.4 MG/ML
0.4 INJECTION, SOLUTION INTRAMUSCULAR; INTRAVENOUS; SUBCUTANEOUS
Status: DISCONTINUED | OUTPATIENT
Start: 2024-06-11 | End: 2024-06-16 | Stop reason: HOSPADM

## 2024-06-11 RX ORDER — NALOXONE HYDROCHLORIDE 0.4 MG/ML
0.2 INJECTION, SOLUTION INTRAMUSCULAR; INTRAVENOUS; SUBCUTANEOUS
Status: DISCONTINUED | OUTPATIENT
Start: 2024-06-11 | End: 2024-06-16 | Stop reason: HOSPADM

## 2024-06-11 RX ORDER — NOREPINEPHRINE BITARTRATE 0.06 MG/ML
.01-.1 INJECTION, SOLUTION INTRAVENOUS CONTINUOUS
Status: DISCONTINUED | OUTPATIENT
Start: 2024-06-11 | End: 2024-06-11

## 2024-06-11 RX ORDER — HYDROMORPHONE HYDROCHLORIDE 4 MG/1
4 TABLET ORAL EVERY 4 HOURS PRN
Status: CANCELLED | OUTPATIENT
Start: 2024-06-11

## 2024-06-11 RX ORDER — AMOXICILLIN 250 MG
1 CAPSULE ORAL 2 TIMES DAILY
Status: CANCELLED | OUTPATIENT
Start: 2024-06-11

## 2024-06-11 RX ORDER — PANTOPRAZOLE SODIUM 40 MG/1
40 TABLET, DELAYED RELEASE ORAL DAILY
Status: DISCONTINUED | OUTPATIENT
Start: 2024-06-11 | End: 2024-06-16 | Stop reason: HOSPADM

## 2024-06-11 RX ORDER — CALCIUM GLUCONATE 20 MG/ML
1 INJECTION, SOLUTION INTRAVENOUS EVERY 6 HOURS PRN
Status: DISCONTINUED | OUTPATIENT
Start: 2024-06-11 | End: 2024-06-15

## 2024-06-11 RX ORDER — SODIUM CHLORIDE, SODIUM LACTATE, POTASSIUM CHLORIDE, CALCIUM CHLORIDE 600; 310; 30; 20 MG/100ML; MG/100ML; MG/100ML; MG/100ML
INJECTION, SOLUTION INTRAVENOUS CONTINUOUS
Status: DISCONTINUED | OUTPATIENT
Start: 2024-06-11 | End: 2024-06-11

## 2024-06-11 RX ORDER — GABAPENTIN 300 MG/1
300 CAPSULE ORAL
Status: COMPLETED | OUTPATIENT
Start: 2024-06-11 | End: 2024-06-11

## 2024-06-11 RX ORDER — ONDANSETRON 2 MG/ML
INJECTION INTRAMUSCULAR; INTRAVENOUS PRN
Status: DISCONTINUED | OUTPATIENT
Start: 2024-06-11 | End: 2024-06-11

## 2024-06-11 RX ORDER — FENTANYL CITRATE 50 UG/ML
INJECTION, SOLUTION INTRAMUSCULAR; INTRAVENOUS PRN
Status: DISCONTINUED | OUTPATIENT
Start: 2024-06-11 | End: 2024-06-11

## 2024-06-11 RX ORDER — LIDOCAINE 40 MG/G
CREAM TOPICAL
Status: DISCONTINUED | OUTPATIENT
Start: 2024-06-11 | End: 2024-06-11 | Stop reason: HOSPADM

## 2024-06-11 RX ORDER — ONDANSETRON 2 MG/ML
4 INJECTION INTRAMUSCULAR; INTRAVENOUS EVERY 6 HOURS PRN
Status: CANCELLED | OUTPATIENT
Start: 2024-06-11

## 2024-06-11 RX ORDER — SODIUM CHLORIDE, SODIUM LACTATE, POTASSIUM CHLORIDE, CALCIUM CHLORIDE 600; 310; 30; 20 MG/100ML; MG/100ML; MG/100ML; MG/100ML
INJECTION, SOLUTION INTRAVENOUS CONTINUOUS PRN
Status: DISCONTINUED | OUTPATIENT
Start: 2024-06-11 | End: 2024-06-11

## 2024-06-11 RX ORDER — SODIUM CHLORIDE, SODIUM GLUCONATE, SODIUM ACETATE, POTASSIUM CHLORIDE AND MAGNESIUM CHLORIDE 526; 502; 368; 37; 30 MG/100ML; MG/100ML; MG/100ML; MG/100ML; MG/100ML
INJECTION, SOLUTION INTRAVENOUS CONTINUOUS PRN
Status: DISCONTINUED | OUTPATIENT
Start: 2024-06-11 | End: 2024-06-11

## 2024-06-11 RX ORDER — CHLORHEXIDINE GLUCONATE ORAL RINSE 1.2 MG/ML
10 SOLUTION DENTAL ONCE
Status: DISCONTINUED | OUTPATIENT
Start: 2024-06-11 | End: 2024-06-11 | Stop reason: HOSPADM

## 2024-06-11 RX ORDER — PROTAMINE SULFATE 10 MG/ML
INJECTION, SOLUTION INTRAVENOUS PRN
Status: DISCONTINUED | OUTPATIENT
Start: 2024-06-11 | End: 2024-06-11

## 2024-06-11 RX ORDER — CALCIUM GLUCONATE 20 MG/ML
2 INJECTION, SOLUTION INTRAVENOUS EVERY 6 HOURS PRN
Status: DISCONTINUED | OUTPATIENT
Start: 2024-06-11 | End: 2024-06-15

## 2024-06-11 RX ORDER — ASPIRIN 81 MG/1
162 TABLET, CHEWABLE ORAL
Status: COMPLETED | OUTPATIENT
Start: 2024-06-11 | End: 2024-06-11

## 2024-06-11 RX ORDER — HEPARIN SODIUM 1000 [USP'U]/ML
INJECTION, SOLUTION INTRAVENOUS; SUBCUTANEOUS PRN
Status: DISCONTINUED | OUTPATIENT
Start: 2024-06-11 | End: 2024-06-11

## 2024-06-11 RX ORDER — NICOTINE POLACRILEX 4 MG
15-30 LOZENGE BUCCAL
Status: DISCONTINUED | OUTPATIENT
Start: 2024-06-11 | End: 2024-06-16 | Stop reason: HOSPADM

## 2024-06-11 RX ORDER — HYDROMORPHONE HYDROCHLORIDE 1 MG/ML
0.5 INJECTION, SOLUTION INTRAMUSCULAR; INTRAVENOUS; SUBCUTANEOUS EVERY 4 HOURS PRN
Status: DISCONTINUED | OUTPATIENT
Start: 2024-06-11 | End: 2024-06-11

## 2024-06-11 RX ORDER — HEPARIN SODIUM 5000 [USP'U]/.5ML
5000 INJECTION, SOLUTION INTRAVENOUS; SUBCUTANEOUS EVERY 8 HOURS
Status: DISCONTINUED | OUTPATIENT
Start: 2024-06-12 | End: 2024-06-15

## 2024-06-11 RX ORDER — PROCHLORPERAZINE MALEATE 5 MG
10 TABLET ORAL EVERY 6 HOURS PRN
Status: DISCONTINUED | OUTPATIENT
Start: 2024-06-11 | End: 2024-06-16 | Stop reason: HOSPADM

## 2024-06-11 RX ORDER — ASPIRIN 81 MG/1
81 TABLET, CHEWABLE ORAL DAILY
Status: DISCONTINUED | OUTPATIENT
Start: 2024-06-12 | End: 2024-06-16 | Stop reason: HOSPADM

## 2024-06-11 RX ORDER — METHOCARBAMOL 750 MG/1
750 TABLET, FILM COATED ORAL 4 TIMES DAILY
Status: DISCONTINUED | OUTPATIENT
Start: 2024-06-11 | End: 2024-06-12

## 2024-06-11 RX ORDER — POLYETHYLENE GLYCOL 3350 17 G/17G
17 POWDER, FOR SOLUTION ORAL DAILY
Status: DISCONTINUED | OUTPATIENT
Start: 2024-06-12 | End: 2024-06-11

## 2024-06-11 RX ORDER — ONDANSETRON 2 MG/ML
4 INJECTION INTRAMUSCULAR; INTRAVENOUS EVERY 6 HOURS PRN
Status: DISCONTINUED | OUTPATIENT
Start: 2024-06-11 | End: 2024-06-16 | Stop reason: HOSPADM

## 2024-06-11 RX ORDER — ASPIRIN 81 MG/1
81 TABLET, CHEWABLE ORAL
Status: COMPLETED | OUTPATIENT
Start: 2024-06-11 | End: 2024-06-11

## 2024-06-11 RX ORDER — LIDOCAINE 40 MG/G
CREAM TOPICAL
Status: DISCONTINUED | OUTPATIENT
Start: 2024-06-11 | End: 2024-06-15

## 2024-06-11 RX ORDER — POLYETHYLENE GLYCOL 3350 17 G/17G
17 POWDER, FOR SOLUTION ORAL 2 TIMES DAILY
Status: DISCONTINUED | OUTPATIENT
Start: 2024-06-11 | End: 2024-06-15

## 2024-06-11 RX ORDER — HYDROMORPHONE HYDROCHLORIDE 1 MG/ML
0.4 INJECTION, SOLUTION INTRAMUSCULAR; INTRAVENOUS; SUBCUTANEOUS
Status: CANCELLED | OUTPATIENT
Start: 2024-06-11

## 2024-06-11 RX ORDER — HYDROMORPHONE HYDROCHLORIDE 1 MG/ML
1 INJECTION, SOLUTION INTRAMUSCULAR; INTRAVENOUS; SUBCUTANEOUS ONCE
Status: COMPLETED | OUTPATIENT
Start: 2024-06-11 | End: 2024-06-11

## 2024-06-11 RX ORDER — LIDOCAINE HYDROCHLORIDE 20 MG/ML
INJECTION, SOLUTION INFILTRATION; PERINEURAL PRN
Status: DISCONTINUED | OUTPATIENT
Start: 2024-06-11 | End: 2024-06-11

## 2024-06-11 RX ORDER — BISACODYL 10 MG
10 SUPPOSITORY, RECTAL RECTAL DAILY PRN
Status: DISCONTINUED | OUTPATIENT
Start: 2024-06-14 | End: 2024-06-15

## 2024-06-11 RX ORDER — DEXMEDETOMIDINE HYDROCHLORIDE 4 UG/ML
.2-.7 INJECTION, SOLUTION INTRAVENOUS CONTINUOUS
Status: CANCELLED | OUTPATIENT
Start: 2024-06-11

## 2024-06-11 RX ORDER — ONDANSETRON 4 MG/1
4 TABLET, ORALLY DISINTEGRATING ORAL EVERY 6 HOURS PRN
Status: CANCELLED | OUTPATIENT
Start: 2024-06-11

## 2024-06-11 RX ORDER — PHENYLEPHRINE HCL IN 0.9% NACL 50MG/250ML
.1-6 PLASTIC BAG, INJECTION (ML) INTRAVENOUS CONTINUOUS
Status: DISCONTINUED | OUTPATIENT
Start: 2024-06-11 | End: 2024-06-11

## 2024-06-11 RX ORDER — ACETAMINOPHEN 325 MG/1
650 TABLET ORAL EVERY 4 HOURS PRN
Status: DISCONTINUED | OUTPATIENT
Start: 2024-06-14 | End: 2024-06-16 | Stop reason: HOSPADM

## 2024-06-11 RX ORDER — FAMOTIDINE 20 MG/1
20 TABLET, FILM COATED ORAL
Status: COMPLETED | OUTPATIENT
Start: 2024-06-11 | End: 2024-06-11

## 2024-06-11 RX ORDER — ACETAMINOPHEN 325 MG/1
975 TABLET ORAL EVERY 8 HOURS
Status: CANCELLED | OUTPATIENT
Start: 2024-06-11 | End: 2024-06-14

## 2024-06-11 RX ORDER — HYDROMORPHONE HYDROCHLORIDE 1 MG/ML
0.2 INJECTION, SOLUTION INTRAMUSCULAR; INTRAVENOUS; SUBCUTANEOUS
Status: CANCELLED | OUTPATIENT
Start: 2024-06-11

## 2024-06-11 RX ORDER — ACETAMINOPHEN 325 MG/1
650 TABLET ORAL EVERY 6 HOURS
Status: DISCONTINUED | OUTPATIENT
Start: 2024-06-11 | End: 2024-06-16 | Stop reason: HOSPADM

## 2024-06-11 RX ORDER — ONDANSETRON 4 MG/1
4 TABLET, ORALLY DISINTEGRATING ORAL EVERY 6 HOURS PRN
Status: DISCONTINUED | OUTPATIENT
Start: 2024-06-11 | End: 2024-06-16 | Stop reason: HOSPADM

## 2024-06-11 RX ORDER — OXYCODONE HYDROCHLORIDE 10 MG/1
10 TABLET ORAL EVERY 4 HOURS PRN
Status: DISCONTINUED | OUTPATIENT
Start: 2024-06-11 | End: 2024-06-15

## 2024-06-11 RX ORDER — CEFAZOLIN SODIUM 2 G/100ML
2 INJECTION, SOLUTION INTRAVENOUS EVERY 8 HOURS
Qty: 300 ML | Refills: 0 | Status: COMPLETED | OUTPATIENT
Start: 2024-06-11 | End: 2024-06-12

## 2024-06-11 RX ADMIN — ONDANSETRON 4 MG: 2 INJECTION INTRAMUSCULAR; INTRAVENOUS at 11:42

## 2024-06-11 RX ADMIN — ONDANSETRON 4 MG: 2 INJECTION INTRAMUSCULAR; INTRAVENOUS at 19:05

## 2024-06-11 RX ADMIN — GABAPENTIN 300 MG: 300 CAPSULE ORAL at 07:12

## 2024-06-11 RX ADMIN — EPINEPHRINE 20 MCG: 1 INJECTION INTRAMUSCULAR; INTRAVENOUS; SUBCUTANEOUS at 08:23

## 2024-06-11 RX ADMIN — NITROGLYCERIN 25 MCG: 10 INJECTION INTRAVENOUS at 09:59

## 2024-06-11 RX ADMIN — Medication 100 MG: at 08:23

## 2024-06-11 RX ADMIN — ACETAMINOPHEN 975 MG: 325 TABLET, FILM COATED ORAL at 07:12

## 2024-06-11 RX ADMIN — SODIUM CHLORIDE 1.25 G/HR: 900 INJECTION, SOLUTION INTRAVENOUS at 09:50

## 2024-06-11 RX ADMIN — PANTOPRAZOLE SODIUM 40 MG: 40 TABLET, DELAYED RELEASE ORAL at 14:54

## 2024-06-11 RX ADMIN — PROPOFOL 100 MG: 10 INJECTION, EMULSION INTRAVENOUS at 08:23

## 2024-06-11 RX ADMIN — HYDROMORPHONE HYDROCHLORIDE 0.5 MG: 1 INJECTION, SOLUTION INTRAMUSCULAR; INTRAVENOUS; SUBCUTANEOUS at 19:05

## 2024-06-11 RX ADMIN — CEFAZOLIN SODIUM 2 G: 2 INJECTION, SOLUTION INTRAVENOUS at 20:43

## 2024-06-11 RX ADMIN — EPINEPHRINE 0.03 MCG/KG/MIN: 1 INJECTION INTRAMUSCULAR; INTRAVENOUS; SUBCUTANEOUS at 11:20

## 2024-06-11 RX ADMIN — FAMOTIDINE 20 MG: 20 TABLET ORAL at 07:16

## 2024-06-11 RX ADMIN — Medication 50 MG: at 11:12

## 2024-06-11 RX ADMIN — METHOCARBAMOL TABLETS 750 MG: 750 TABLET, COATED ORAL at 16:24

## 2024-06-11 RX ADMIN — METHOCARBAMOL TABLETS 750 MG: 750 TABLET, COATED ORAL at 20:26

## 2024-06-11 RX ADMIN — SODIUM CHLORIDE 7.5 G: 900 INJECTION, SOLUTION INTRAVENOUS at 08:56

## 2024-06-11 RX ADMIN — MIDAZOLAM 1 MG: 1 INJECTION INTRAMUSCULAR; INTRAVENOUS at 08:23

## 2024-06-11 RX ADMIN — OXYCODONE 10 MG: 10 TABLET ORAL at 14:54

## 2024-06-11 RX ADMIN — NITROGLYCERIN 25 MCG: 10 INJECTION INTRAVENOUS at 09:58

## 2024-06-11 RX ADMIN — SUGAMMADEX 200 MG: 100 INJECTION, SOLUTION INTRAVENOUS at 12:36

## 2024-06-11 RX ADMIN — MIDAZOLAM 2 MG: 1 INJECTION INTRAMUSCULAR; INTRAVENOUS at 08:05

## 2024-06-11 RX ADMIN — SODIUM CHLORIDE, SODIUM GLUCONATE, SODIUM ACETATE, POTASSIUM CHLORIDE AND MAGNESIUM CHLORIDE: 526; 502; 368; 37; 30 INJECTION, SOLUTION INTRAVENOUS at 08:19

## 2024-06-11 RX ADMIN — FENTANYL CITRATE 350 MCG: 50 INJECTION INTRAMUSCULAR; INTRAVENOUS at 08:23

## 2024-06-11 RX ADMIN — Medication 50 MG: at 09:20

## 2024-06-11 RX ADMIN — Medication 50 MG: at 11:06

## 2024-06-11 RX ADMIN — FENTANYL CITRATE 50 MCG: 50 INJECTION INTRAMUSCULAR; INTRAVENOUS at 08:15

## 2024-06-11 RX ADMIN — LIDOCAINE HYDROCHLORIDE 100 MG: 20 INJECTION, SOLUTION INFILTRATION; PERINEURAL at 08:23

## 2024-06-11 RX ADMIN — HYDROMORPHONE HYDROCHLORIDE 0.3 MG: 1 INJECTION, SOLUTION INTRAMUSCULAR; INTRAVENOUS; SUBCUTANEOUS at 11:20

## 2024-06-11 RX ADMIN — Medication 2 G: at 08:50

## 2024-06-11 RX ADMIN — HYDROMORPHONE HYDROCHLORIDE 0.2 MG: 1 INJECTION, SOLUTION INTRAMUSCULAR; INTRAVENOUS; SUBCUTANEOUS at 11:17

## 2024-06-11 RX ADMIN — HYDROMORPHONE HYDROCHLORIDE 0.2 MG: 1 INJECTION, SOLUTION INTRAMUSCULAR; INTRAVENOUS; SUBCUTANEOUS at 11:45

## 2024-06-11 RX ADMIN — HEPARIN SODIUM 40000 UNITS: 1000 INJECTION INTRAVENOUS; SUBCUTANEOUS at 09:48

## 2024-06-11 RX ADMIN — HYDROMORPHONE HYDROCHLORIDE 0.3 MG: 1 INJECTION, SOLUTION INTRAMUSCULAR; INTRAVENOUS; SUBCUTANEOUS at 11:54

## 2024-06-11 RX ADMIN — ASPIRIN 162 MG: 81 TABLET ORAL at 07:12

## 2024-06-11 RX ADMIN — SODIUM CHLORIDE, SODIUM GLUCONATE, SODIUM ACETATE, POTASSIUM CHLORIDE AND MAGNESIUM CHLORIDE: 526; 502; 368; 37; 30 INJECTION, SOLUTION INTRAVENOUS at 12:00

## 2024-06-11 RX ADMIN — HYDROMORPHONE HYDROCHLORIDE 0.5 MG: 1 INJECTION, SOLUTION INTRAMUSCULAR; INTRAVENOUS; SUBCUTANEOUS at 14:10

## 2024-06-11 RX ADMIN — NOREPINEPHRINE BITARTRATE 6.4 MCG: 1 INJECTION, SOLUTION, CONCENTRATE INTRAVENOUS at 10:01

## 2024-06-11 RX ADMIN — VANCOMYCIN HYDROCHLORIDE 1500 MG: 10 INJECTION, POWDER, LYOPHILIZED, FOR SOLUTION INTRAVENOUS at 07:14

## 2024-06-11 RX ADMIN — HYDROMORPHONE HYDROCHLORIDE 1 MG: 1 INJECTION, SOLUTION INTRAMUSCULAR; INTRAVENOUS; SUBCUTANEOUS at 16:24

## 2024-06-11 RX ADMIN — MIDAZOLAM 1 MG: 1 INJECTION INTRAMUSCULAR; INTRAVENOUS at 11:45

## 2024-06-11 RX ADMIN — VANCOMYCIN HYDROCHLORIDE 1500 MG: 10 INJECTION, POWDER, LYOPHILIZED, FOR SOLUTION INTRAVENOUS at 18:55

## 2024-06-11 RX ADMIN — ACETAMINOPHEN 650 MG: 325 TABLET, FILM COATED ORAL at 20:26

## 2024-06-11 RX ADMIN — Medication 12.5 MG: at 07:17

## 2024-06-11 RX ADMIN — FENTANYL CITRATE 100 MCG: 50 INJECTION INTRAMUSCULAR; INTRAVENOUS at 09:20

## 2024-06-11 RX ADMIN — SODIUM CHLORIDE, POTASSIUM CHLORIDE, SODIUM LACTATE AND CALCIUM CHLORIDE: 600; 310; 30; 20 INJECTION, SOLUTION INTRAVENOUS at 08:30

## 2024-06-11 RX ADMIN — HYDROMORPHONE HYDROCHLORIDE 0.5 MG: 1 INJECTION, SOLUTION INTRAMUSCULAR; INTRAVENOUS; SUBCUTANEOUS at 12:56

## 2024-06-11 RX ADMIN — ACETAMINOPHEN 650 MG: 325 TABLET, FILM COATED ORAL at 14:55

## 2024-06-11 RX ADMIN — PROTAMINE SULFATE 250 MG: 10 INJECTION, SOLUTION INTRAVENOUS at 11:18

## 2024-06-11 ASSESSMENT — ACTIVITIES OF DAILY LIVING (ADL)
ADLS_ACUITY_SCORE: 22
ADLS_ACUITY_SCORE: 20
ADLS_ACUITY_SCORE: 24
ADLS_ACUITY_SCORE: 20
ADLS_ACUITY_SCORE: 22
ADLS_ACUITY_SCORE: 20
ADLS_ACUITY_SCORE: 20
ADLS_ACUITY_SCORE: 22
ADLS_ACUITY_SCORE: 20
ADLS_ACUITY_SCORE: 24
ADLS_ACUITY_SCORE: 20
ADLS_ACUITY_SCORE: 24

## 2024-06-11 ASSESSMENT — VISUAL ACUITY: OU: GLASSES

## 2024-06-11 NOTE — DISCHARGE INSTRUCTIONS
AFTER YOU GO HOME FROM YOUR HEART SURGERY  (Aortic Valve Replacement with 27/29 mm ON-X mechanical valve by Dr. Kat )    You had a sternotomy, avoid lifting anything greater than ten pounds for 8 weeks after surgery and then less than 20 pounds for an additional 4 weeks. Do not reach backwards or use arms to push out of chair. Do not let people pull on your arms to assist with standing. Avoid twisting or reaching too far across your body.  Avoid strenuous activities such as bowling, vacuuming, raking, shoveling, golf or tennis for 12 weeks after your surgery. It is okay to resume sex if you feel comfortable in doing so. You may have to try different positions with your partner.  Splint your chest incision by hugging a pillow or bringing your arms across your chest when coughing or sneezing. Please try to sleep on your back for the first 4-6 weeks to avoid extra stress on your sternum (breastbone) while it is healing.     No driving for 4 weeks after surgery or while on pain medication.    Shower or wash your incisions twice daily with soap and water (or as instructed), pat dry. Keep wound clean and dry, showers are okay after discharge, but don't let spray hit directly on incision. No baths or swimming for 1 month. Cover chest tube sites with gauze until they stop draining, then leave open to air. It is not abnormal for chest tube sites to drain yellowish/clear fluid for up to 2-3 weeks after surgery.   Watch for signs of infection: increased redness, tenderness, warmth or any drainage from sternum incision.  Also a temperature > 100.5 F or chills. Call your surgeon or primary care provider's office immediately. Remove any skin glue left on incisions after 10-14 days. This will not affect your incision and can speed up healing.    Exercise is very important in your recovery. Please follow the guidelines set up for you in your cardiac rehab classes at the hospital. If outpatient cardiac rehab was ordered for  "you, we highly recommend you participate. If you have problems arranging your cardiac rehab, please call [phone] for all locations, with the exception of [place], please call [phone] and [place], please call [phone].    Avoid sitting for prolonged periods of time, try to walk every hour during the day. If you have a leg incision, elevate your leg often when you are not walking.    Check your weight when you get home from the hospital and continue to check it daily through your recovery for at least a month. If you notice a weight gain of 2-3 pounds in a week, notify your primary care physician, cardiologist or surgeon.    Bowel activity may be slow after surgery. If necessary, you may take an over the counter laxative such as Milk of Magnesia or Miralax. You may have stool softeners prescribed (docusate sodium, Senokot). We recommend using stool softeners while using narcotics for pain (oxycodone/percocet, hydrocodone/vicodin, hydromorphone/dilaudid).      Wean OFF of narcotics (oxycodone, dilaudid, hydrocodone) as soon as possible. You should continue taking acetaminophen as long as you have any surgical pain as the first choice for pain control and add narcotics as necessary for pain to be tolerable.      DENTAL VISITS AFTER SURGERY  If you have had your heart valve repaired or replaced, we do not recommend having any dental work done for 6 months and you will need to take an antibiotic prior to dental visits from now on.  Please notify your dentist before any procedure for the proper treatment needed. The antibiotic is taken by mouth one hour prior to visit. This includes routine cleanings.  You can sometimes hear a mechanical valve \"clicking,\" this is normal and not a sign of something wrong.    DO NOT SMOKE.  IF YOU NEED HELP QUITTING, PLEASE TALK WITH YOUR CARDIOLOGIST OR PRIMARY DOCTOR.    You are on a blood thinner, follow the instructions you were given in the hospital and DO NOT SKIP this " medication. Try and take it the same time everyday. Your primary care physician or coumadin clinic will manage the dosing. INR goal is 2-3.      REGARDING PRESCRIPTION REFILLS.  If you need a refill on your pain medication contact us to discuss your pain and a possible one time refill.   All other medications will be adjusted, discontinued and re-filled by your primary care physician and/or your cardiologist as they were prior to your surgery. We have given you enough for one to three month with possibly one refill.    POST-OPERATIVE CLINIC VISITS  You have a follow up visit with CVTS Surgery Advance Care Practitioners at the [facility] in ~2 weeks.  You will then return to the care of your primary provider and your cardiologist. Future medication refills should come from your PCP or Cardiologist.   You should see your primary care provider in 2-4 weeks after discharge.   It is important to see your cardiologist about 4-6 weeks after discharge.    You will follow up with your anticoagulation clinic. This is scheduled on 6/18.  If you do not hear from a  in 7 days, please call [phone] (choose option 1) and request to be seen with a general cardiologist or someone that you have seen in the past.   If there is a need to return to see CT Surgery, please call our  Norma Bearden at [phone].     SURGICAL QUESTIONS  Please call Demetri Adame, Stacey Torres, Miladys Kenney, or Riri Mendoza with surgical recovery and medication questions; phone numbers are listed below.  They will assist you with your needs and contact other surgery care team members as indicated.    On weekends or after hours, please call [phone] and ask the  to page the Cardiothoracic Surgery fellow on call.      Thank you,    Your Cardiothoracic Surgery Team   Demetri Adame RN Care Coordinator - [phone]  Stacey Torres RN Care Coordinator - [phone]   Riri Marrero RN Care Coordinator -  [phone]   Marga Kenney, RN Care Coordinator - [phone]     Appointment:  INR check on 6/18/24 at 1 pm  It is VERY important to attend this appointment, as it is to establish MD oversight of your new warfarin/coumadin medication and blood level.    Indication: On X valve with AVE 6/11/24 by [facility] CVTS surgeon Dr Kat  Duration: Lifetime  Goal INR: 2-3, then 1.5-2 after 3 months from surgery date

## 2024-06-11 NOTE — ANESTHESIA PROCEDURE NOTES
Perioperative YOHANNES Procedure Note    Staff -        Anesthesiologist:  Behrens, Christopher J, MD       Performed By: anesthesiologist  Preanesthesia Checklist:  Patient identified, IV assessed, risks and benefits discussed, monitors and equipment assessed, procedure being performed at surgeon's request and anesthesia consent obtained.    YOHANNES Probe Insertion    Probe Status PRE Insertion: NO obvious damage  Probe type:  Adult 3D  Bite block used:   Soft  Insertion Technique: Jaw Lift  Insertion complications: None obvious  Billing Report:YOHANNES report by Anesthesiologist (See Separate Report note)  Probe Status POST Removal: NO obvious damage    YOHANNES Report  General Procedure Information  Images for this study have been archived.  Modalities: 2D, 3D, CW Doppler, PW Doppler and Color flow mapping  Echocardiographic and Doppler Measurements  Right Ventricle:  Cavity size normal.    Hypertrophy not present.   Thrombus not present.    Global function normal.     Left Ventricle:  Cavity size normal.    Hypertrophy not present.   Thrombus not present.   Global Function mildly impaired.       Ventricular Regional Function:  1- Basal Anteroseptal:  normal  2- Basal Anterior:  normal  3- Basal Anterolateral:  normal  4- Basal Inferolateral:  normal  5- Basal Inferior:  normal  6- Basal Inferoseptal:  normal  7- Mid Anteroseptal:  normal  8- Mid Anterior:  normal  9- Mid Anterolateral:  normal  10- Mid Inferolateral:  normal  11- Mid Inferior:  normal  12- Mid Inferoseptal:  normal  13- Apical Anterior:  normal  14- Apical Lateral:  normal  15- Apical Inferior:  normal  16- Apical Septal:  normal  17- [place]:  normal    Valves  Aortic Valve: Annulus normal.  Stenosis not present.  Regurgitation +4.  Leaflets normal.  Leaflet motions prolapsed.    Mitral Valve: Annulus normal.  Stenosis not present.  Regurgitation absent.  Leaflets normal.  Leaflet motions normal.    Tricuspid Valve: Annulus normal.  Stenosis not present.   Regurgitation absent.  Leaflets normal.  Leaflet motions normal.    Pulmonic Valve: Annulus normal.  Stenosis not present.  Regurgitation absent.      Aorta: Ascending Aorta: Size normal.  Dissection not present.  Plaque thickness less than 3 mm.  Mobile plaque not present.    Aortic Arch: Size normal.    Dissection not present.   Plaque thickness less than 3 mm.   Mobile plaque not present.    Descending Aorta: Size normal.    Dissection not present.   Plaque thickness less than 3 mm.   Mobile plaque not present.      Right Atrium:  Size normal.   Spontaneous echo contrast not present.   Thrombus not present.   Tumor not present.   Device not present.     Left Atrium: Size normal.  Spontaneous echo contrast not present.  Thrombus not present.  Tumor not present.  Device not present.    Left atrial appendage normal.     Atrial Septum: Intra-atrial septal morphology normal.       Ventricular Septum: Intra-ventricular septum morphology normal.          Post Intervention Findings  Procedure(s) performed:  Aortic Valve Repair/Replace. Global function:  Unchanged. Regional wall motion: Unchanged. Surgeon(s) notified of all postintervention findings: Yes.             Post Intervention comments: AV: Placement of new mechanical valve. Mean gradient 9, Normal leaflet morphology and function, no regurgitation. Washing jets present    Rest of exam not significantly changed compared to pre-op exam.  .    Echocardiogram Comments  Echocardiogram comments: LV: Normal ventricle size with normal global function, EF 45-50%.   AV: Normal leaflet morphology with normal function. Severe regurgitation from prolapse of left coronary cusp, there also appears to be some prolapse of non-coronary cusp. PHT >500, holosystolic reversal of flow in descending aorta  MV: Normal leaflet morphology and function. No stenosis suspected, no regurgitation noted.  RV: Normal ventricular size with normal function. EF 45%  TV: Normal leaflet morphology and  function. No stenosis suspected with trace regurgitation noted.  PV:Normal leaflet morphology and function. No stenosis suspected, no regurgitation noted.  Aorta: Diameter within normal limits. No evidence of aneurysm, hematoma or dissection. Grade 0 atherosclerotic disease.  .

## 2024-06-11 NOTE — PROGRESS NOTES
"CLINICAL NUTRITION SERVICES - BRIEF NOTE    Received provider consult for nutrition education with comments post op cardiovascular surgery (automatic consult on post-op order set). S/p AVR on 6/11. Nutrition education not indicated.    RD will follow per LOS protocol or if re-consulted.     Jelly Modi, MS, RD, LD  Available on Parental Health - \"4A Clinical Dietitian\"  Weekend/Holiday RD - \"Weekend Clinical Dietitian\"  "

## 2024-06-11 NOTE — PROGRESS NOTES
Major Shift Events:  Patient arrived up from OR extubated/no pressors. Transitioned to 2 L nasal cannula. Using IS up to 1000. Reports moderate to severe incisional pain. MD notified and pain meds increased. Left hand numbness noted in thumb/pointer finger and middle finger. Strong pulses in RUE. Strong /warm fingers. Up to chair with standby assist. Tolerating clear liquids. Hemodynamically stable.   Plan: Continue to monitor. Notify MD of any status changes.   For vital signs and complete assessments, please see documentation flowsheets.

## 2024-06-11 NOTE — ANESTHESIA PROCEDURE NOTES
Arterial Line Procedure Note    Pre-Procedure   Staff -        Anesthesiologist:  Behrens, Christopher J, MD       Performed By: anesthesiologist       Location: OR       Pre-Anesthestic Checklist: patient identified, IV checked, site marked, risks and benefits discussed, informed consent, monitors and equipment checked, pre-op evaluation, at physician/surgeon's request and post-op pain management  Timeout:       Correct Patient: Yes        Correct Procedure: Yes        Correct Site: Yes        Correct Position: Yes   Line Placement:   This line was placed Pre Induction starting at 6/11/2024 8:16 PM and ending at 6/11/2024 8:22 PM  Procedure   Procedure: arterial line       Laterality: left       Insertion Site: radial.  Sterile Prep        All elements of maximal sterile barrier technique not followed for medical reasons       Skin prep: Chloraprep  Insertion/Injection        Technique: Seldinger Technique        Catheter Type/Size: 20 gauge, 12 cm  Narrative         Secured by: other       Tegaderm dressing used.       Complications: None apparent,        Arterial waveform: Yes    Comments:  All elements of maximal sterile barrier technique followed excluding gown.

## 2024-06-11 NOTE — ANESTHESIA PREPROCEDURE EVALUATION
Anesthesia Pre-Procedure Evaluation    Patient: Adam Garcia   MRN: [id number] : 1985        Procedure : Procedure(s):  AORTIC VALVE REPAIR POSSIBLE REPLACMENT AND ALL ASSOCIATED PROCEDURES,ECHOCARDIOGRAM, TRANSESOPHAGEAL, WITH ANESTHESIA          Past Medical History:   Diagnosis Date    ADHD (attention deficit hyperactivity disorder)     Obesity       Past Surgical History:   Procedure Laterality Date    NO HISTORY OF SURGERY        No Known Allergies   Social History     Tobacco Use    Smoking status: Former     Types: Cigarettes    Smokeless tobacco: Never   Substance Use Topics    Alcohol use: Yes     Comment: 3-4 drinks week      Wt Readings from Last 1 Encounters:   24 107.1 kg (236 lb 1.8 oz)        Anesthesia Evaluation            ROS/MED HX  ENT/Pulmonary:       Neurologic:       Cardiovascular: Comment: Cardiac MRI 24  1. The LV is severely dilated with normal wall thickness. The global systolic function is mildly reduced.  The LVEF is 53%. There is mild global hypokinesis of the left ventricle.      2. The RV is normal in cavity size. The global systolic function is low normal. The RVEF is 50%.      3. Both atria are normal in size.     4. The aortic valve is trileaflet. There is severe aortic regurgitation (regurgitant volume of 105 ml,  corresponding to a regurgitant fraction of 46%).  The jet appears to originate from a region of  malcoaptation between the non-coronary and left coronary cusps.  Diastolic flow reversal is noted within  the descending thoracic aorta.      5. Late gadolinium enhancement imaging shows no MI, fibrosis or infiltrative disease.      CONCLUSIONS:      1. Severely dilated left ventricle with mildly reduced systolic function.     2. Trileaflet aortic valve with severe aortic regurgitation. The jet appears to originate from a region of  malcoaptation between the non-coronary and left coronary cusps.      3. No late enhancement to suggest prior infarct,  "inflammation or infiltration.       (+)  - -   -  - -                           valvular problems/murmurs type: AS       (-) murmur and wheezes   METS/Exercise Tolerance:     Hematologic:       Musculoskeletal:       GI/Hepatic:       Renal/Genitourinary:       Endo:     (+)               Obesity,       Psychiatric/Substance Use:       Infectious Disease:       Malignancy:       Other:            Physical Exam    Airway        Mallampati: II   TM distance: > 3 FB   Neck ROM: full   Mouth opening: > 3 cm    Respiratory Devices and Support         Dental  no notable dental history         Cardiovascular          Rhythm and rate: regular and normal (-) no systolic click and no murmur    Pulmonary   pulmonary exam normal        breath sounds clear to auscultation   (-) no wheezes        OUTSIDE LABS:  CBC:   Lab Results   Component Value Date    WBC 5.5 05/28/2024    HGB 14.6 05/28/2024    HCT 42.2 05/28/2024     05/28/2024     BMP:   Lab Results   Component Value Date     05/28/2024    POTASSIUM 4.1 05/28/2024    CHLORIDE 105 05/28/2024    CO2 26 05/28/2024    BUN 12.5 05/28/2024    CR 0.78 05/28/2024    GLC 94 06/11/2024    GLC 87 05/28/2024     COAGS: No results found for: \"PTT\", \"INR\", \"FIBR\"  POC: No results found for: \"BGM\", \"HCG\", \"HCGS\"  HEPATIC:   Lab Results   Component Value Date    ALBUMIN 4.2 05/28/2024    PROTTOTAL 6.6 05/28/2024    ALT 10 05/28/2024    AST 18 05/28/2024    ALKPHOS 65 05/28/2024    BILITOTAL 0.5 05/28/2024     OTHER:   Lab Results   Component Value Date    A1C 5.2 05/28/2024    MARKUS 9.0 05/28/2024       Anesthesia Plan    ASA Status:  3    NPO Status:  NPO Appropriate    Anesthesia Type: General.     - Airway: ETT   Induction: Intravenous.   Maintenance: Inhalation.   Techniques and Equipment:     - Lines/Monitors: 2nd IV, Arterial Line, Central Line, CVP, YOHANNES, BIS, NIRS            YOHANNES Absolute Contra-indication: NONE            YOHANNES Relative Contra-indication: NONE     - Blood: " "T&C     - Drips/Meds: Vasopressin, Norepi, Epinephrine, Fentanyl     Consents    Anesthesia Plan(s) and associated risks, benefits, and realistic alternatives discussed. Questions answered and patient/representative(s) expressed understanding.     - Discussed:     - Discussed with:  Patient      - Extended Intubation/Ventilatory Support Discussed: Yes.      - Patient is DNR/DNI Status: No     Use of blood products discussed: Yes.     - Discussed with: Patient.     Postoperative Care    Pain management: IV analgesics.   PONV prophylaxis: Ondansetron (or other 5HT-3), Dexamethasone or Solumedrol     Comments:               Christopher J. Behrens, MD    I have reviewed the pertinent notes and labs in the chart from the past 30 days and (re)examined the patient.  Any updates or changes from those notes are reflected in this note.              # Obesity: Estimated body mass index is 35.9 kg/m  as calculated from the following:    Height as of this encounter: 1.727 m (5' 8\").    Weight as of this encounter: 107.1 kg (236 lb 1.8 oz).      "

## 2024-06-11 NOTE — LETTER
Transition Communication Hand-off for Care Transitions to Next Level of Care Provider    Name: Adam Garcia  : 1985  MRN #: [id number]  Primary Care Provider: Bisi Mccollum     Primary [facility]: [facility] [address]  [place] [address]     Reason for Hospitalization:  AS (aortic stenosis) [I35.0]  Admit Date/Time: 2024  5:53 AM  Discharge Date: ***  Payor Source: Payor: BCBS / Plan: [place] / Product Type: Indemnity /     Readmission Assessment Measure (MISAEL) Risk Score/category: ***    Plan of Care Goals/Milestone Events:   Patient Concerns: ***   Patient Goals:   Short-term ***   Long-term ***   Medical Goals   Short-term ***   Long-term ***         Reason for Communication Hand-off Referral: {CCREASONCMCTNHANDOFFREFERRALCTS:25408}    Discharge Plan:       Concern for non-adherence with plan of care:   Y/N ***  Discharge Needs Assessment:  Needs      Flowsheet Row Most Recent Value   Equipment Currently Used at Home shower chair            Already enrolled in Tele-monitoring program and name of program:  ***  Follow-up specialty is recommended: {YES / NO:97670}    Follow-up plan:    Future Appointments   Date Time Provider Department Center   2024  9:00 AM 2, [facility]   2024  1:45 PM [facility]   7/3/2024  3:00 PM 1, [facility]   2024  2:00 PM 2, [facility]   2024  9:00 AM 2, [facility]   7/10/2024  2:00 PM 2, [facility]   2024  8:00 AM 1, [facility]   7/15/2024  8:00 AM 1, [facility]   2024  7:00 AM 2, [facility]   2024  2:00 PM 2, [facility]   8/15/2024  9:15 AM [facility]       Any outstanding tests or procedures:        Referrals       Future Labs/Procedures    Cardiac Rehab   Referral     Process Instructions:    Advance to Wellness and Exercise for Life (WEL) Program or to another maintenance exercise program upon completion of phase 2 cardiac rehab.        Comments:    Please be aware that coverage of these services is subject to the terms and limitations of your health insurance plan.  Call member services at your health plan with any benefit or coverage questions.     Order is sent electronically to central rehab scheduling. Call [phone] if you haven't been contacted regarding these appointments within 2 business days of discharge.  If you have not heard from the scheduling office within 2 business days, please call [phone] for [facility], [phone] for [facility] and [phone] for [facility].              Key Recommendations:      JAYA Stone    AVS/Discharge Summary is the source of truth; this is a helpful guide for improved communication of patient story

## 2024-06-11 NOTE — ANESTHESIA PROCEDURE NOTES
Central Line/PA Catheter Placement    Pre-Procedure   Staff -        Anesthesiologist:  Behrens, Christopher J, MD       Performed By: anesthesiologist       Location: OR       Pre-Anesthestic Checklist: patient identified, IV checked, site marked, risks and benefits discussed, informed consent, monitors and equipment checked, pre-op evaluation, at physician/surgeon's request and post-op pain management  Timeout:       Correct Patient: Yes        Correct Procedure: Yes        Correct Site: Yes        Correct Position: Yes        Correct Laterality: Yes   Line Placement:   This line was placed Post Induction    Procedure   Procedure: central line       Laterality: right       Insertion Site: internal jugular.       Patient Position: Trendelenburg  Sterile Prep        All elements of maximal sterile barrier technique followed       Patient Prep/Sterile Barriers: draped, hand hygiene, gloves , hat , mask , draped, gown, sterile gel and probe cover       Skin prep: Chloraprep  Insertion/Injection        Technique: Seldinger Technique        1. Ultrasound was used to evaluate the access site.       2. Vein evaluated via ultrasound for patency/adequacy.       3. Using real-time ultrasound the needle/catheter was observed entering the artery/vein.       4. Permanent image was captured and entered into the patient's record.       5. The visualized structures were anatomically normal.       6. There were no apparent abnormal pathologic findings.       Introducer Type: 9 Fr, 2-lumen MAC        Type: Introducer       Catheter Size: 9 Fr       Catheter Length: 11.5       Number of Lumens: double lumen  Narrative         Secured by: suture       Tegaderm and Biopatch dressing used.       Complications: None apparent,        blood aspirated from all lumens,        All lumens flushed: Yes       Verification method: Placement to be verified post-op and YOHANNES       Tip termination: right atrium

## 2024-06-11 NOTE — BRIEF OP NOTE
[facility]    Brief Operative Note    Pre-operative diagnosis: Severe aortic insufficiency   Post-operative diagnosis Same as pre-operative diagnosis    Procedure: Median Sternotmy, AORTIC VALVE REPLACMENT with 27/29 On-X Prosthetic Heart Valve with Anatomical Sewing Ring, On Cardiopulmonary Bypass, ECHOCARDIOGRAM, TRANSESOPHAGEAL, WITH ANESTHESIA, N/A - Heart    Surgeon: Surgeons and Role:     * Nehemiah Kat MD - Primary     * Stephani Jordan PA-C  Anesthesia: General   Estimated Blood Loss: see anesthesia log     Drains: Two 32F mediastinal drain.   Specimens:   ID Type Source Tests Collected by Time Destination   1 : Aortic Valve and Leaflets Tissue Heart Valve, Aortic SURGICAL PATHOLOGY EXAM Nehemiah Kat MD 6/11/2024  1:02 PM      Findings:   Bicuspid Native aortic valve,  See op report .  Complications: See op report  .  Implants:   Implant Name Type Inv. Item Serial No.  Lot No. LRB No. Used Action   VALVE AORTIC ON-X ANATOMIC RING 27/29MM ONXANE-27/29 - [id number] Valve VALVE AORTIC ON-X ANATOMIC RING 27/29MM ONXANE-27/29 [id number] [employer]  N/A 1 Implanted       Stephani Valentin PA-C  Cardiothoracic Surgery  Pager [phone]  June 11, 2024

## 2024-06-11 NOTE — OP NOTE
Date of Service: June 11th, 2024    Referring Cardiologist: Jaquelin Henriquez MD    Preoperative Diagnosis: severe aortic valve insufficiency    Postoperative Diagnosis: severe aortic valve insufficiency    Surgeon: Nehemiah Kat MD    Assistant: JOSELYN Shi    Name of Operation: Aortic valve replacement with 27/29 mm ARTIVION On-X mechanical valve, intraoperative YOHANNES    Anesthesia: general orotracheal    Indications for Procedure: Mr. Garcia is a very pleasant otherwise healthy 38-year-old male with no significant past medical history who was recently found to have severe aortic valve insufficiency from what appeared to be noncoronary cusp leaflet prolapse.  His left ventricle was mildly dilated even though the systolic function was preserved and he also had worsening dyspnea on exertion and fatigue.  He was taken to the operating today for a possible aortic valve repair versus a likely replacement with a mechanical valve.    Operative Findings: The patient had an overall normal LV systolic function but the LV was mildly dilated.  His aortic root anatomy and ascending aorta was normal.  His aortic valve was technically bicuspid even though the 3 sinuses were equal, there was a partial fusion of the left coronary cusp and the right coronary cusp.  Both of these leaflets prolapsed.  The right coronary cusp appeared normal.  Given the unusual complexity of his valve anatomy, I felt that attempting a repair in such valve would not likely to have a guaranteeing long-term durability, and I therefore replaced it with a mechanical valve.    Description of the Procedure: After informed consent was obtained, the patient was brought down to the operating room and was placed on the OR table in the supine position.  Intravenous and intra-arterial lines were begun.  While monitoring his blood pressure and EKG tracing, he was anesthetized and intubated using a single-lumen endotracheal tube.  A [place]-Sasha catheter was also  placed.  His entire chest, abdomen, both groins and legs were prepped down to the toes using multiple layers of DuraPrep.  He was draped in a sterile field.  A median sternotomy was performed and the sternal edges were retracted laterally.  The pericardium was opened to suspend the heart in a pericardial cradle.  The patient was fully heparinized.  The ascending aorta and the right atrial appendage were cannulated.  A retrograde cardioplegia catheter was placed into the coronary sinus without difficulty.  An antegrade needle/aortic root vent was placed in the ascending aorta as well.  After appropriate ACT level was achieved, cardiopulmonary bypass was established and the patient was kept normothermic during the entire operation.  An LV vent was placed via the right superior pulmonary vein.  Carbon dioxide was flooded into the chest to prevent air embolism.  The aorta was then crossclamped, and a liter of retrograde cardioplegia was given to fully arrest the heart.  The patient went into good diastolic arrest without any LV distention.  Following this, intermittent retrograde cardio plegia doses were given on average every 15 minutes for myocardial protection while the  heart was cross-clamped.    First, a transverse aortotomy was made and the aortic valve was exposed.  Findings were noted above.  We decided to proceed with an aortic valve replacement.  The aortic valve leaflets were excised and the annulus was irrigated out.  At this point, we gave extra myocardial protection by delivering cardioplegia down the right and left coronary ostia using a hand-held probe.  The annulus was sized to a 27/29 mm On-X mechanical valve.  Annular sutures were placed using horizontal mattress sutures without pledgets.  The valve was then brought to the field and all sutures were brought through the sewing ring and the valve was seated down without difficulty.  All sutures were tied down securely using the Cor-Knot device.  We  tested both mechanical leaflets and they opened and closed freely.  The aortotomy was then closed in 2 layers of running 4-0 Prolene.  Retrograde hotshot was given and the aortic cross-clamp was removed.  Aortic cross-clamp time was 57 minutes.      The patient was placed in Trendelenburg position.  The ascending aorta and the left ventricle were continuously vented to de-air the heart.  The patient was then weaned of cardiopulmonary bypass with minimal inotropic and vasopressor support.  Total cardiopulmonary bypass time was 69 minutes.  Once the patient remained stable off bypass, the venous cannula was moved and protamine was given.  The aortic cannula was subsequently removed as well.  Temporary ventricular pacing wire was placed in the RV muscle.  32 Bhutanese angled and straight chest tubes were placed in mediastinum as well.  These were all brought out percutaneously below the sternotomy incision and secured to the skin using 2-0 Ethibond.  Both pleural spaces were slightly opened.  The mediastinum was irrigated with antibiotic saline and hemostasis achieved.  The sternum was approximated using multiple interrupted single and double wires.  The incision was closed in layers of running Vicryl suture.  The skin was closed using 3-0 Vicryl and was sealed using Dermabond.    There were no intraoperative complications and the patient tolerated the operation well.  No blood products were given intraoperatively.  All sponge counts, needle counts, and instrument counts were correct x 2 at the end the operation.  EBL: 300 cc.  Specimen removed: aortic valve leaflets.  The patient was brought to the ICU in hemodynamically stable condition.  Post bypass run YOHANNES demonstrated a well-seated mechanical aortic valve with no paravalvular leak with a mean gradient of 9 mmHg.    Nehemiah Kat MD

## 2024-06-11 NOTE — ANESTHESIA PROCEDURE NOTES
Airway       Patient location during procedure: OR       Procedure Start/Stop Times: 6/11/2024 8:26 AM  Staff -        CRNA: Gaurav Leger APRN CRNA       Performed By: CRNA  Consent for Airway        Urgency: elective  Indications and Patient Condition       Indications for airway management: sera-procedural       Induction type:intravenous       Mask difficulty assessment: 2 - vent by mask + OA or adjuvant +/- NMBA    Final Airway Details       Final airway type: endotracheal airway       Successful airway: ETT - single  Endotracheal Airway Details        ETT size (mm): 8.0       Cuffed: yes       Successful intubation technique: direct laryngoscopy       DL Blade Type: Mays 2       Grade View of Cords: 1       Adjucts: stylet       Position: Right       Measured from: lips       Secured at (cm): 23       Bite block used: None    Post intubation assessment        Placement verified by: capnometry, equal breath sounds and chest rise        Number of attempts at approach: 1       Number of other approaches attempted: 0       Secured with: tape       Ease of procedure: easy       Dentition: Intact and Unchanged    Medication(s) Administered   Medication Administration Time: 6/11/2024 8:26 AM

## 2024-06-11 NOTE — ANESTHESIA CARE TRANSFER NOTE
Patient: Adam Garcia    Procedure: Procedure(s):  Median Sternotmy, AORTIC VALVE REPLACMENT with 27/29 On-X Prosthetic Heart Valve with Anatomical Sewing Ring, On Cardiopulmonary Bypass, ECHOCARDIOGRAM, TRANSESOPHAGEAL, WITH ANESTHESIA       Diagnosis: AS (aortic stenosis) [I35.0]  Diagnosis Additional Information: No value filed.    Anesthesia Type:   General     Note:    Oropharynx: oropharynx clear of all foreign objects  Level of Consciousness: awake  Oxygen Supplementation: face mask  Level of Supplemental Oxygen (L/min / FiO2): 8  Independent Airway: airway patency satisfactory and stable  Dentition: dentition unchanged  Vital Signs Stable: post-procedure vital signs reviewed and stable  Report to RN Given: handoff report given  Patient transferred to: ICU    ICU Handoff: Call for PAUSE to initiate/utilize ICU HANDOFF, Identified Patient, Identified Responsible Provider, Reviewed the Pertinent Medical History, Discussed Surgical Course, Reviewed Intra-OP Anesthesia Management and Issues during Anesthesia, Set Expectations for Post Procedure Period and Allowed Opportunity for Questions and Acknowledgement of Understanding    Vitals:  Vitals Value Taken Time   BP     Temp     Pulse     Resp     SpO2         Electronically Signed By: CATHERINE Curran CRNA  June 11, 2024  1:07 PM

## 2024-06-11 NOTE — LETTER
Transition Communication Hand-off for Care Transitions to Next Level of Care Provider    Name: Adam Garcia  : 1985  MRN #: [id number]  Primary Care Provider: Bisi Mccollum     Primary [facility]: [facility] [address]  [place] [address]     Reason for Hospitalization:  AS (aortic stenosis) [I35.0]  Admit Date/Time: 2024  5:53 AM  Discharge Date: 2024  Payor Source: Payor: BCBS / Plan: [place] / Product Type: Indemnity /          Reason for Communication Hand-off Referral: Care transition inpatient to outpatient    Discharge Plan: See AVS       Discharge Needs Assessment:  Needs      Flowsheet Row Most Recent Value   Equipment Currently Used at Home shower chair          Follow-up plan:    Future Appointments   Date Time Provider Department Center   2024  2:15 PM 3, [facility]   8/15/2024  9:15 AM Mariel Cuevas MD [facility]       Any outstanding tests or procedures:        Referrals       Future Labs/Procedures    Cardiac Rehab  Referral     Process Instructions:    Advance to Wellness and Exercise for Life (WEL) Program or to another maintenance exercise program upon completion of phase 2 cardiac rehab.        Comments:    Please be aware that coverage of these services is subject to the terms and limitations of your health insurance plan.  Call member services at your health plan with any benefit or coverage questions.     Order is sent electronically to central rehab scheduling. Call [phone] if you haven't been contacted regarding these appointments within 2 business days of discharge.  If you have not heard from the scheduling office within 2 business days, please call [phone] for  [facility], [phone] for [place] and [phone] for [facility].              Key Recommendations:      JHON Guzman    AVS/Discharge Summary is the source of truth; this is a helpful guide for improved  communication of patient story

## 2024-06-12 ENCOUNTER — APPOINTMENT (OUTPATIENT)
Dept: OCCUPATIONAL THERAPY | Facility: CLINIC | Age: 39
End: 2024-06-12
Attending: PHYSICIAN ASSISTANT
Payer: COMMERCIAL

## 2024-06-12 ENCOUNTER — APPOINTMENT (OUTPATIENT)
Dept: GENERAL RADIOLOGY | Facility: CLINIC | Age: 39
End: 2024-06-12
Attending: PHYSICIAN ASSISTANT
Payer: COMMERCIAL

## 2024-06-12 LAB
ALBUMIN SERPL BCG-MCNC: 3.8 G/DL (ref 3.5–5.2)
ALP SERPL-CCNC: 52 U/L (ref 40–150)
ALT SERPL W P-5'-P-CCNC: 12 U/L (ref 0–70)
ANION GAP SERPL CALCULATED.3IONS-SCNC: 8 MMOL/L (ref 7–15)
ANION GAP SERPL CALCULATED.3IONS-SCNC: 9 MMOL/L (ref 7–15)
AST SERPL W P-5'-P-CCNC: 28 U/L (ref 0–45)
ATRIAL RATE - MUSE: 106 BPM
ATRIAL RATE - MUSE: 92 BPM
BILIRUB SERPL-MCNC: 0.7 MG/DL
BUN SERPL-MCNC: 6.3 MG/DL (ref 6–20)
BUN SERPL-MCNC: 8.5 MG/DL (ref 6–20)
CA-I BLD-MCNC: 4.4 MG/DL (ref 4.4–5.2)
CALCIUM SERPL-MCNC: 8.5 MG/DL (ref 8.6–10)
CALCIUM SERPL-MCNC: 8.5 MG/DL (ref 8.6–10)
CHLORIDE SERPL-SCNC: 102 MMOL/L (ref 98–107)
CHLORIDE SERPL-SCNC: 99 MMOL/L (ref 98–107)
CREAT SERPL-MCNC: 0.7 MG/DL (ref 0.67–1.17)
CREAT SERPL-MCNC: 0.82 MG/DL (ref 0.67–1.17)
DEPRECATED HCO3 PLAS-SCNC: 25 MMOL/L (ref 22–29)
DEPRECATED HCO3 PLAS-SCNC: 27 MMOL/L (ref 22–29)
DIASTOLIC BLOOD PRESSURE - MUSE: NORMAL MMHG
DIASTOLIC BLOOD PRESSURE - MUSE: NORMAL MMHG
EGFRCR SERPLBLD CKD-EPI 2021: >90 ML/MIN/1.73M2
EGFRCR SERPLBLD CKD-EPI 2021: >90 ML/MIN/1.73M2
ERYTHROCYTE [DISTWIDTH] IN BLOOD BY AUTOMATED COUNT: 13.5 % (ref 10–15)
GLUCOSE BLDC GLUCOMTR-MCNC: 123 MG/DL (ref 70–99)
GLUCOSE BLDC GLUCOMTR-MCNC: 130 MG/DL (ref 70–99)
GLUCOSE BLDC GLUCOMTR-MCNC: 136 MG/DL (ref 70–99)
GLUCOSE BLDC GLUCOMTR-MCNC: 137 MG/DL (ref 70–99)
GLUCOSE BLDC GLUCOMTR-MCNC: 156 MG/DL (ref 70–99)
GLUCOSE SERPL-MCNC: 144 MG/DL (ref 70–99)
GLUCOSE SERPL-MCNC: 159 MG/DL (ref 70–99)
HCT VFR BLD AUTO: 36.5 % (ref 40–53)
HGB BLD-MCNC: 13.2 G/DL (ref 13.3–17.7)
INR PPP: 1.23 (ref 0.85–1.15)
INTERPRETATION ECG - MUSE: NORMAL
INTERPRETATION ECG - MUSE: NORMAL
MAGNESIUM SERPL-MCNC: 1.8 MG/DL (ref 1.7–2.3)
MAGNESIUM SERPL-MCNC: 1.9 MG/DL (ref 1.7–2.3)
MCH RBC QN AUTO: 30.7 PG (ref 26.5–33)
MCHC RBC AUTO-ENTMCNC: 36.2 G/DL (ref 31.5–36.5)
MCV RBC AUTO: 85 FL (ref 78–100)
P AXIS - MUSE: 46 DEGREES
P AXIS - MUSE: 54 DEGREES
PHOSPHATE SERPL-MCNC: 1.9 MG/DL (ref 2.5–4.5)
PHOSPHATE SERPL-MCNC: 2.5 MG/DL (ref 2.5–4.5)
PLATELET # BLD AUTO: 196 10E3/UL (ref 150–450)
POTASSIUM SERPL-SCNC: 3.9 MMOL/L (ref 3.4–5.3)
POTASSIUM SERPL-SCNC: 4.2 MMOL/L (ref 3.4–5.3)
PR INTERVAL - MUSE: 200 MS
PR INTERVAL - MUSE: 200 MS
PROT SERPL-MCNC: 6 G/DL (ref 6.4–8.3)
QRS DURATION - MUSE: 82 MS
QRS DURATION - MUSE: 94 MS
QT - MUSE: 302 MS
QT - MUSE: 368 MS
QTC - MUSE: 401 MS
QTC - MUSE: 455 MS
R AXIS - MUSE: 3 DEGREES
R AXIS - MUSE: 9 DEGREES
RBC # BLD AUTO: 4.3 10E6/UL (ref 4.4–5.9)
SODIUM SERPL-SCNC: 135 MMOL/L (ref 135–145)
SODIUM SERPL-SCNC: 135 MMOL/L (ref 135–145)
SYSTOLIC BLOOD PRESSURE - MUSE: NORMAL MMHG
SYSTOLIC BLOOD PRESSURE - MUSE: NORMAL MMHG
T AXIS - MUSE: 105 DEGREES
T AXIS - MUSE: 21 DEGREES
VENTRICULAR RATE- MUSE: 106 BPM
VENTRICULAR RATE- MUSE: 92 BPM
WBC # BLD AUTO: 10.3 10E3/UL (ref 4–11)

## 2024-06-12 PROCEDURE — 250N000013 HC RX MED GY IP 250 OP 250 PS 637: Performed by: PHYSICIAN ASSISTANT

## 2024-06-12 PROCEDURE — 85610 PROTHROMBIN TIME: CPT | Performed by: STUDENT IN AN ORGANIZED HEALTH CARE EDUCATION/TRAINING PROGRAM

## 2024-06-12 PROCEDURE — 97165 OT EVAL LOW COMPLEX 30 MIN: CPT | Mod: GO

## 2024-06-12 PROCEDURE — 250N000013 HC RX MED GY IP 250 OP 250 PS 637

## 2024-06-12 PROCEDURE — 84100 ASSAY OF PHOSPHORUS: CPT | Performed by: SURGERY

## 2024-06-12 PROCEDURE — 83735 ASSAY OF MAGNESIUM: CPT | Performed by: PHYSICIAN ASSISTANT

## 2024-06-12 PROCEDURE — 82330 ASSAY OF CALCIUM: CPT | Performed by: PHYSICIAN ASSISTANT

## 2024-06-12 PROCEDURE — 36415 COLL VENOUS BLD VENIPUNCTURE: CPT

## 2024-06-12 PROCEDURE — 250N000011 HC RX IP 250 OP 636: Mod: JZ | Performed by: SURGERY

## 2024-06-12 PROCEDURE — 250N000013 HC RX MED GY IP 250 OP 250 PS 637: Performed by: SURGERY

## 2024-06-12 PROCEDURE — 250N000011 HC RX IP 250 OP 636: Mod: JZ | Performed by: STUDENT IN AN ORGANIZED HEALTH CARE EDUCATION/TRAINING PROGRAM

## 2024-06-12 PROCEDURE — 93005 ELECTROCARDIOGRAM TRACING: CPT

## 2024-06-12 PROCEDURE — 258N000003 HC RX IP 258 OP 636: Mod: JZ | Performed by: PHYSICIAN ASSISTANT

## 2024-06-12 PROCEDURE — 85027 COMPLETE CBC AUTOMATED: CPT | Performed by: PHYSICIAN ASSISTANT

## 2024-06-12 PROCEDURE — 999N000248 HC STATISTIC IV INSERT WITH US BY RN

## 2024-06-12 PROCEDURE — 258N000003 HC RX IP 258 OP 636: Mod: JZ | Performed by: SURGERY

## 2024-06-12 PROCEDURE — 120N000005 HC R&B MS OVERFLOW UMMC

## 2024-06-12 PROCEDURE — 71045 X-RAY EXAM CHEST 1 VIEW: CPT | Mod: 26 | Performed by: RADIOLOGY

## 2024-06-12 PROCEDURE — 84100 ASSAY OF PHOSPHORUS: CPT | Performed by: PHYSICIAN ASSISTANT

## 2024-06-12 PROCEDURE — 97530 THERAPEUTIC ACTIVITIES: CPT | Mod: GO

## 2024-06-12 PROCEDURE — 999N000147 HC STATISTIC PT IP EVAL DEFER

## 2024-06-12 PROCEDURE — 99233 SBSQ HOSP IP/OBS HIGH 50: CPT | Mod: 24

## 2024-06-12 PROCEDURE — 80053 COMPREHEN METABOLIC PANEL: CPT | Performed by: PHYSICIAN ASSISTANT

## 2024-06-12 PROCEDURE — 83735 ASSAY OF MAGNESIUM: CPT | Performed by: SURGERY

## 2024-06-12 PROCEDURE — 250N000009 HC RX 250: Performed by: SURGERY

## 2024-06-12 PROCEDURE — 250N000011 HC RX IP 250 OP 636: Performed by: PHYSICIAN ASSISTANT

## 2024-06-12 PROCEDURE — 250N000013 HC RX MED GY IP 250 OP 250 PS 637: Performed by: STUDENT IN AN ORGANIZED HEALTH CARE EDUCATION/TRAINING PROGRAM

## 2024-06-12 PROCEDURE — 71045 X-RAY EXAM CHEST 1 VIEW: CPT

## 2024-06-12 RX ORDER — MAGNESIUM SULFATE HEPTAHYDRATE 40 MG/ML
2 INJECTION, SOLUTION INTRAVENOUS ONCE
Qty: 50 ML | Refills: 0 | Status: COMPLETED | OUTPATIENT
Start: 2024-06-12 | End: 2024-06-12

## 2024-06-12 RX ORDER — WARFARIN SODIUM 5 MG/1
5 TABLET ORAL
Status: COMPLETED | OUTPATIENT
Start: 2024-06-12 | End: 2024-06-12

## 2024-06-12 RX ORDER — METHOCARBAMOL 750 MG/1
750 TABLET, FILM COATED ORAL EVERY 6 HOURS
Status: DISCONTINUED | OUTPATIENT
Start: 2024-06-12 | End: 2024-06-16 | Stop reason: HOSPADM

## 2024-06-12 RX ORDER — FUROSEMIDE 10 MG/ML
20 INJECTION INTRAMUSCULAR; INTRAVENOUS ONCE
Status: COMPLETED | OUTPATIENT
Start: 2024-06-12 | End: 2024-06-12

## 2024-06-12 RX ADMIN — OXYCODONE HYDROCHLORIDE 5 MG: 5 TABLET ORAL at 12:49

## 2024-06-12 RX ADMIN — OXYCODONE HYDROCHLORIDE 5 MG: 5 TABLET ORAL at 00:17

## 2024-06-12 RX ADMIN — HEPARIN SODIUM 5000 UNITS: 5000 INJECTION, SOLUTION INTRAVENOUS; SUBCUTANEOUS at 20:22

## 2024-06-12 RX ADMIN — FUROSEMIDE 20 MG: 10 INJECTION, SOLUTION INTRAMUSCULAR; INTRAVENOUS at 12:08

## 2024-06-12 RX ADMIN — CEFAZOLIN SODIUM 2 G: 2 INJECTION, SOLUTION INTRAVENOUS at 12:08

## 2024-06-12 RX ADMIN — ACETAMINOPHEN 650 MG: 325 TABLET, FILM COATED ORAL at 02:53

## 2024-06-12 RX ADMIN — METHOCARBAMOL TABLETS 750 MG: 750 TABLET, COATED ORAL at 12:07

## 2024-06-12 RX ADMIN — CALCIUM GLUCONATE 1 G: 20 INJECTION, SOLUTION INTRAVENOUS at 05:16

## 2024-06-12 RX ADMIN — MAGNESIUM SULFATE HEPTAHYDRATE 2 G: 2 INJECTION, SOLUTION INTRAVENOUS at 06:30

## 2024-06-12 RX ADMIN — ACETAMINOPHEN 650 MG: 325 TABLET, FILM COATED ORAL at 09:11

## 2024-06-12 RX ADMIN — POTASSIUM PHOSPHATE, MONOBASIC AND POTASSIUM PHOSPHATE, DIBASIC 9 MMOL: 224; 236 INJECTION, SOLUTION, CONCENTRATE INTRAVENOUS at 07:00

## 2024-06-12 RX ADMIN — OXYCODONE HYDROCHLORIDE 5 MG: 5 TABLET ORAL at 09:11

## 2024-06-12 RX ADMIN — ACETAMINOPHEN 650 MG: 325 TABLET, FILM COATED ORAL at 20:22

## 2024-06-12 RX ADMIN — POLYETHYLENE GLYCOL 3350 17 G: 17 POWDER, FOR SOLUTION ORAL at 07:44

## 2024-06-12 RX ADMIN — ASPIRIN 81 MG CHEWABLE TABLET 81 MG: 81 TABLET CHEWABLE at 07:44

## 2024-06-12 RX ADMIN — CEFAZOLIN SODIUM 2 G: 2 INJECTION, SOLUTION INTRAVENOUS at 04:13

## 2024-06-12 RX ADMIN — VANCOMYCIN HYDROCHLORIDE 1500 MG: 10 INJECTION, POWDER, LYOPHILIZED, FOR SOLUTION INTRAVENOUS at 06:23

## 2024-06-12 RX ADMIN — WARFARIN SODIUM 5 MG: 5 TABLET ORAL at 18:43

## 2024-06-12 RX ADMIN — PANTOPRAZOLE SODIUM 40 MG: 40 TABLET, DELAYED RELEASE ORAL at 07:44

## 2024-06-12 RX ADMIN — DOCUSATE SODIUM 50 MG AND SENNOSIDES 8.6 MG 2 TABLET: 8.6; 5 TABLET, FILM COATED ORAL at 20:22

## 2024-06-12 RX ADMIN — ACETAMINOPHEN 650 MG: 325 TABLET, FILM COATED ORAL at 15:49

## 2024-06-12 RX ADMIN — HEPARIN SODIUM 5000 UNITS: 5000 INJECTION, SOLUTION INTRAVENOUS; SUBCUTANEOUS at 12:08

## 2024-06-12 RX ADMIN — DOCUSATE SODIUM 50 MG AND SENNOSIDES 8.6 MG 2 TABLET: 8.6; 5 TABLET, FILM COATED ORAL at 07:44

## 2024-06-12 RX ADMIN — POLYETHYLENE GLYCOL 3350 17 G: 17 POWDER, FOR SOLUTION ORAL at 20:22

## 2024-06-12 RX ADMIN — METHOCARBAMOL TABLETS 750 MG: 750 TABLET, COATED ORAL at 06:26

## 2024-06-12 RX ADMIN — OXYCODONE 10 MG: 10 TABLET ORAL at 20:22

## 2024-06-12 RX ADMIN — ONDANSETRON 4 MG: 2 INJECTION INTRAMUSCULAR; INTRAVENOUS at 04:41

## 2024-06-12 RX ADMIN — METHOCARBAMOL TABLETS 750 MG: 750 TABLET, COATED ORAL at 18:43

## 2024-06-12 RX ADMIN — OXYCODONE HYDROCHLORIDE 5 MG: 5 TABLET ORAL at 04:49

## 2024-06-12 RX ADMIN — OXYCODONE HYDROCHLORIDE 5 MG: 5 TABLET ORAL at 15:49

## 2024-06-12 ASSESSMENT — ACTIVITIES OF DAILY LIVING (ADL)
ADLS_ACUITY_SCORE: 25
ADLS_ACUITY_SCORE: 24
ADLS_ACUITY_SCORE: 25
ADLS_ACUITY_SCORE: 24
ADLS_ACUITY_SCORE: 25
ADLS_ACUITY_SCORE: 24
ADLS_ACUITY_SCORE: 25
ADLS_ACUITY_SCORE: 24
ADLS_ACUITY_SCORE: 25
ADLS_ACUITY_SCORE: 24
ADLS_ACUITY_SCORE: 25
ADLS_ACUITY_SCORE: 24

## 2024-06-12 NOTE — PLAN OF CARE
Transfer  Transferred from:   Via: wheelchair  Reason for transfer: no longer requiring ICU level of care  Family: Aware of transfer, wife and mother present at transfer  Belongings: Received with pt  Chart: Received with pt  Medications: n/a  Code Status verified on armband: yes  2 RN Skin Assessment Completed By: Yg RN  Bed surface reassessed with algorithm and charted: n/a  New bed surface ordered: no  Suction/Ambu bag/Flowmeter at bedside: yes     Report received from: Diana  Pt status: Patient alert and oriented, vitals WDL on RA, patient and family oriented to unit

## 2024-06-12 NOTE — PLAN OF CARE
Major Shift Events: PRN oxy for pain management. Up to chair at 0500 in attempt to decrease severe back pain. Severe nausea, palor and diaphoresis with getting to chair, PRN zofran given and MD updated. UO adequate, chest tube output 10-20mL/hr.     Plan: De-line and orders for floor.    For vital signs and complete assessments, please see documentation flowsheets.

## 2024-06-12 NOTE — PROGRESS NOTES
CV ICU PROGRESS NOTE  06/12/2024  Adam Garcia  [id number]  Admitted: 6/11/2024  5:53 AM      ASSESSMENT: Adam Garcia is a 38 year old male with PMH of tobacco use, ADHD, severe AR. Presents to [facility] for AVR by Dr. Kat on 6/11. Extubated in OR.     CO-MORBIDITIES:   S/P AVR (aortic valve replacement)  (primary encounter diagnosis)    Changes/updates today:  - Furosemide 20mg x 1  - FVG net neg 500-1L  - Start coumadin  - Cap pacer wires    PLAN:  Neuro/ pain/ sedation:  - Monitor neurological status. Notify the MD for any acute changes in exam.  #ADHD  - Hold PTA Adderall until extubated  #Acute postoperative pain  - Scheduled: Tylenol  - PRN: Tylenol, oxycodone, Dilaudid     Pulmonary care:   # No acute concerns  Resp: 18  - Goal SpO2 > 92%; currently on 2L NC  - Encourage IS q15-30 minutes when awake.  - Restart PTA Zyrtec and Flonase when able     Cardiovascular:    # Severe AR s/p AVR On-X valve on 6/11 w/ Dr. Kat  Pre CPBP: 45-50%, severe AR prolapse left coronary cusp, some prolapse non-coronary cusp.   Post CPBP: No AR, similar EF, no abnormalities.  - Lipid panel & triglycerides unremarkable 03/2024  - Goal MAP >65, SBP <140, monitor hemodynamics  - Start ASA     GI care / Nutrition:   - Bedside swallow eval, advance diet as tolerated  - PPI  - Bowel regimen: MiraLAX, senna     Renal / Fluids / Electrolytes:   BL creat appears to be ~ 0.8  - Strict I/O, daily weights, avoid/limit nephrotoxins  - Replete lytes PRN per protocol     Endocrine:    #Stress hyperglycemia  Preop A1c 5.2  - Insulin gtt discontinued  - Goal BG <180 for optimal healing  - Hypoglycemia order set in place     ID / Antibiotics:  #Stress induced leukocytosis  - To complete perioperative regimen  - Monitor fever curve, WBC, and inflammatory markers as appropriate     Heme:     #Acute blood loss anemia  #Acute blood loss thrombocytopenia  No s/sx active bleeding  - CBC   - Hgb goal > 7.0  - Start Warfarin today       MSK /  Skin:  #Sternotomy  #Surgical Incision  - Sternal precautions, postop incision management protocol  - PT/OT/CR     Prophylaxis:     - Mechanical DVT ppx  - Chemical DVT ppx: Start SQH and Warfarin   - PPI     Lines / Tubes / Drains:  - CTs x2 (Meds)  - TPW (capped)    Disposition:  - Transfer to CVTS     ICU Checklist  F - Feeding:   A - Analgesia:   S - Sedation:   T - Thromboembolic prophylaxis:      H - Head of bed elevated  U - Ulcer prophylaxis:  G - Glycemic control  S - SBT     B - Bowel regimen  I - Indwelling catheter  D - De-escalation of antibiotics    Patient seen, findings and plan discussed with CVICU staff and cardiothoracic surgeons.  Time spent with patient 40  This does not include time spent on procedures or teaching.     CATHERINE Mckeon CNP,   6/12/2024 at 7:03 AM      ====================================    TODAY'S PROGRESS  SUBJECTIVE  NAEON    OBJECTIVE  1. VITAL SIGNS  Temp:  [98.1  F (36.7  C)-99  F (37.2  C)] 98.3  F (36.8  C)  Pulse:  [] 104  Resp:  [15-22] 20  BP: (124)/(88) 124/88  MAP:  [83 mmHg-107 mmHg] 95 mmHg  Arterial Line BP: (107-146)/(68-90) 131/79  SpO2:  [95 %-100 %] 96 %  Resp: 20      2. INTAKE/ OUTPUT  I/O last 3 completed shifts:  In: 5597 [P.O.:1680; I.V.:3387; Other:530]  Out: 2650 [Urine:1910; Chest Tube:740]    3. PHYSICAL EXAMINATION    GEN: not in distress  EYES: PERRL, Anicteric sclera.   HEENT:  Normocephalic, atraumatic, trachea midline, Pupils PERRLA  CV: RRR, no gallops, rubs, or murmurs  PULM/CHEST: Clear breath sounds bilaterally and coarse bilateral lower without wheeze, symmetric chest rise  GI: normal bowel sounds, soft, non-tender, no rebound tenderness or guarding, no masses  : Voiding spontaneously  EXTREMITIES: No peripheral edema, moving all extremities, peripheral pulses intact  NEURO: Cranial nerves II-XII grossly intact, no motor-sensory deficits noted  SKIN: Sternal incision well approximated, natural skin color, and no edema  PSYCH:   "Affect: appropriate          4. INVESTIGATIONS  Arterial Blood Gases   Recent Labs   Lab 06/11/24 2152 06/11/24 1444 06/11/24  1222 06/11/24  1128   PH 7.36 7.33* 7.40 7.36   PCO2 49* 45 41 44   PO2 112* 150* 296* 161*   HCO3 27 23 25 25     Complete Blood Count   Recent Labs   Lab 06/12/24 0423 06/11/24 1318 06/11/24  1222 06/11/24  1143   WBC 10.3 13.1*  --  9.2   HGB 13.2* 13.2* 12.5* 11.8*    176  --  162     Basic Metabolic Panel  Recent Labs   Lab 06/12/24 0446 06/12/24 0423 06/12/24 0022 06/11/24 2156 06/11/24 1448 06/11/24 1318 06/11/24 1316 06/11/24 1222 06/11/24  1128   NA  --  135  --   --   --  139  --  139 138   POTASSIUM  --  4.2  --   --   --  4.4  --  4.3 4.5   CHLORIDE  --  102  --   --   --  107  --   --   --    CO2  --  25  --   --   --  24  --   --   --    BUN  --  6.3  --   --   --  10.4  --   --   --    CR  --  0.70  --   --   --  0.84  --   --   --    * 159* 130* 172*   < > 137*   < > 115* 126*    < > = values in this interval not displayed.     Liver Function Tests  Recent Labs   Lab 06/12/24 0423 06/11/24 1318 06/11/24  1143   AST 28 27  --    ALT 12 12  --    ALKPHOS 52 57  --    BILITOTAL 0.7 0.7  --    ALBUMIN 3.8 3.8  --    INR  --  1.25* 1.38*     Pancreatic Enzymes  No lab results found in last 7 days.  Coagulation Profile  Recent Labs   Lab 06/11/24 1318 06/11/24  1143   INR 1.25* 1.38*   * 83*     Lactate  Invalid input(s): \"LACTATE\"    5. RADIOLOGY  Recent Results (from the past 24 hour(s))   YOHANNES with Report    Narrative    Behrens, Christopher J, MD     6/11/2024 12:39 PM  Perioperative YOHANNES Procedure Note    Staff -        Anesthesiologist:  Behrens, Christopher J, MD       Performed By: anesthesiologist  Preanesthesia Checklist:  Patient identified, IV assessed, risks and   benefits discussed, monitors and equipment assessed, procedure being   performed at surgeon's request and anesthesia consent obtained.    YOHANNES Probe Insertion    Probe Status " PRE Insertion: NO obvious damage  Probe type:  Adult 3D  Bite block used:   Soft  Insertion Technique: Jaw Lift  Insertion complications: None obvious  Billing Report:YOHANNES report by Anesthesiologist (See Separate Report note)  Probe Status POST Removal: NO obvious damage    YOHANNES Report  General Procedure Information  Images for this study have been archived.  Modalities: 2D, 3D, CW Doppler, PW Doppler and Color flow mapping  Echocardiographic and Doppler Measurements  Right Ventricle:  Cavity size normal.    Hypertrophy not present.     Thrombus not present.    Global function normal.     Left Ventricle:  Cavity size normal.    Hypertrophy not present.     Thrombus not present.   Global Function mildly impaired.       Ventricular Regional Function:  1- Basal Anteroseptal:  normal  2- Basal Anterior:  normal  3- Basal Anterolateral:  normal  4- Basal Inferolateral:  normal  5- Basal Inferior:  normal  6- Basal Inferoseptal:  normal  7- Mid Anteroseptal:  normal  8- Mid Anterior:  normal  9- Mid Anterolateral:  normal  10- Mid Inferolateral:  normal  11- Mid Inferior:  normal  12- Mid Inferoseptal:  normal  13- Apical Anterior:  normal  14- Apical Lateral:  normal  15- Apical Inferior:  normal  16- Apical Septal:  normal  17- [place]:  normal    Valves  Aortic Valve: Annulus normal.  Stenosis not present.  Regurgitation +4.    Leaflets normal.  Leaflet motions prolapsed.    Mitral Valve: Annulus normal.  Stenosis not present.  Regurgitation   absent.  Leaflets normal.  Leaflet motions normal.    Tricuspid Valve: Annulus normal.  Stenosis not present.  Regurgitation   absent.  Leaflets normal.  Leaflet motions normal.    Pulmonic Valve: Annulus normal.  Stenosis not present.  Regurgitation   absent.      Aorta: Ascending Aorta: Size normal.  Dissection not present.  Plaque   thickness less than 3 mm.  Mobile plaque not present.    Aortic Arch: Size normal.    Dissection not present.   Plaque thickness   less than 3 mm.    Mobile plaque not present.    Descending Aorta: Size normal.    Dissection not present.   Plaque   thickness less than 3 mm.   Mobile plaque not present.      Right Atrium:  Size normal.   Spontaneous echo contrast not present.     Thrombus not present.   Tumor not present.   Device not present.     Left Atrium: Size normal.  Spontaneous echo contrast not present.    Thrombus not present.  Tumor not present.  Device not present.    Left atrial appendage normal.     Atrial Septum: Intra-atrial septal morphology normal.       Ventricular Septum: Intra-ventricular septum morphology normal.          Post Intervention Findings  Procedure(s) performed:  Aortic Valve Repair/Replace. Global function:    Unchanged. Regional wall motion: Unchanged. Surgeon(s) notified of all   postintervention findings: Yes.             Post Intervention comments: AV: Placement of new mechanical valve. Mean   gradient 9, Normal leaflet morphology and function, no regurgitation.   Washing jets present    Rest of exam not significantly changed compared to pre-op exam.  .    Echocardiogram Comments  Echocardiogram comments: LV: Normal ventricle size with normal global   function, EF 45-50%.   AV: Normal leaflet morphology with normal function. Severe regurgitation   from prolapse of left coronary cusp, there also appears to be some   prolapse of non-coronary cusp. PHT >500, holosystolic reversal of flow in   descending aorta  MV: Normal leaflet morphology and function. No stenosis suspected, no   regurgitation noted.  RV: Normal ventricular size with normal function. EF 45%  TV: Normal leaflet morphology and function. No stenosis suspected with   trace regurgitation noted.  PV:Normal leaflet morphology and function. No stenosis suspected, no   regurgitation noted.  Aorta: Diameter within normal limits. No evidence of aneurysm, hematoma or   dissection. Grade 0 atherosclerotic disease.  .   XR Chest Port 1 View    Narrative    EXAM: XR CHEST PORT  1 VIEW 6/11/2024 1:52 PM    INDICATION: Post Op CVTS Surgery    COMPARISON: CT 4/8/2024    TECHNIQUE: Single portable semiupright AP view of the chest.    FINDINGS:   Patient is rotated slightly left anterior oblique. Possible changes of  aortic valve replacement with midline sternotomy wires intact. Right  IJ catheter tip in the high SVC. Intact midline sternotomy wires.  Cardiomegaly. Trachea is midline. Bilateral costophrenic blunting with  mild silhouetting of the diaphragm. No abnormal calcifications were  acute osseous abnormality.       Impression    IMPRESSION:   Postsurgical changes of aortic valve replacement with mild bibasilar  atelectasis versus pulmonary edema. No acute airspace disease.    I have personally reviewed the examination and initial interpretation  and I agree with the findings.    OLIVA GORDILLO MD         SYSTEM ID:  [id number]

## 2024-06-12 NOTE — PHARMACY-ADMISSION MEDICATION HISTORY
Pharmacy Intern Admission Medication History    Admission medication history is complete. The information provided in this note is only as accurate as the sources available at the time of the update.    Information Source(s): Patient, Family member, and CareEverywhere/SureScripts via in-person    Pertinent Information:     Changes made to PTA medication list:  Added: None  Deleted: None  Changed:   Aluminum chloride solution: instructions for use added.  clindamycin (CLEOCIN T) 1 % external solution: Apply topically 2 times daily. ---> clindamycin (CLEOCIN T) 1 % external solution: Apply topically daily as needed (Acne).  fluticasone (FLONASE) 50 MCG/ACT nasal spray: Spray 1 spray into both nostrils daily. ----> fluticasone (FLONASE) 50 MCG/ACT nasal spray: Spray 1 spray into both nostrils 2 times daily as needed for allergies.    Allergies reviewed with patient and updates made in EHR: yes    Medication History Completed By: David Ivey 6/12/2024 11:20 AM    PTA Med List   Medication Sig Last Dose    aluminum chloride (DRYSOL) 20 % external solution Apply topically daily as needed (Sweating) More than a month    amphetamine-dextroamphetamine (ADDERALL) 30 MG tablet Take 30 mg by mouth 2 times daily 6/10/2024    cetirizine (ZYRTEC) 5 MG tablet Take 5 mg by mouth every morning 6/10/2024    clindamycin (CLEOCIN T) 1 % external solution Apply topically daily as needed (Acne) More than a month    fluticasone (FLONASE) 50 MCG/ACT nasal spray Spray 1 spray into both nostrils 2 times daily as needed for allergies Past Week    ibuprofen (ADVIL/MOTRIN) 200 MG capsule Take 400 mg by mouth every 4 hours as needed for fever More than a month

## 2024-06-12 NOTE — PROGRESS NOTES
06/12/24 1237   Appointment Info   Signing Clinician's Name / Credentials (OT) Katharine Hall OTR/L   Rehab Comments (OT) sternal precs, OT/CR only   Living Environment   People in Home spouse   Current Living Arrangements house   Home Accessibility stairs to enter home;stairs within home   Number of Stairs, Main Entrance 2;other (see comments)  (~2 LATOYA from front door and garage)   Stair Railings, Main Entrance railings safe and in good condition   Number of Stairs, Within Home, Primary eight;other (see comments)  (~8 to basement, can avoid)   Stair Railings, Within Home, Primary railings safe and in good condition   Transportation Anticipated family or friend will provide   Living Environment Comments Pt reports living in house with spouse, ~2 LATOYA from front door and garage, access to ~3 inch lip step-in shower with shower stool   Self-Care   Usual Activity Tolerance excellent   Current Activity Tolerance moderate   Equipment Currently Used at Home shower chair   Fall history within last six months no   Activity/Exercise/Self-Care Comment Pt reports ADL IND at baseline, no AD for functional mobility   Instrumental Activities of Daily Living (IADL)   Previous Responsibilities housekeeping;meal prep;driving;work   IADL Comments Pt reports IADL IND at baseline, works full time, enjoys walking on treadmill in basement, spouse and family will be able to assist prn.   General Information   Onset of Illness/Injury or Date of Surgery 06/11/24   Referring Physician Stephani Jordan PA-C   Patient/Family Therapy Goal Statement (OT) Return home and to PLOF   Additional Occupational Profile Info/Pertinent History of Current Problem Per EMR, Adam STORY Jose is a 38 year old male with PMH of tobacco use, ADHD, severe AR. Presents to [facility] for AVR by Dr. Kat on 6/11. Extubated in OR.   Existing Precautions/Restrictions cardiac;fall;sternal   Limitations/Impairments safety/cognitive   Left Upper Extremity (Weight-bearing  Status) partial weight-bearing (PWB)  (no > 10 lb)   Right Upper Extremity (Weight-bearing Status) partial weight-bearing (PWB)  (no > 10 lb)   Left Lower Extremity (Weight-bearing Status) full weight-bearing (FWB)   Right Lower Extremity (Weight-bearing Status) full weight-bearing (FWB)   General Observations and Info OT consult: Status Post Cardiac Surgery   Cognitive Status Examination   Orientation Status orientation to person, place and time   Cognitive Status Comments Cognition appears intact, will monitor   Visual Perception   Visual Impairment/Limitations WFL;corrective lenses full-time   Sensory   Sensory Quick Adds sensation intact   Pain Assessment   Patient Currently in Pain Yes, see Vital Sign flowsheet   Posture   Posture not impaired   Posture Comments up in chair   Range of Motion Comprehensive   General Range of Motion bilateral upper extremity ROM WFL   Strength Comprehensive (MMT)   Comment, General Manual Muscle Testing (MMT) Assessment B UE strength grossly deconditioned, formal MMT not assessed this date due to post-op precautions   Muscle Tone Assessment   Muscle Tone Quick Adds No deficits were identified   Coordination   Upper Extremity Coordination No deficits were identified   Bed Mobility   Bed Mobility supine-sit   Comment (Bed Mobility) CGA   Transfers   Transfers sit-stand transfer;toilet transfer   Sit-Stand Transfer   Sit/Stand Transfer Comments CGA STS from chair   Toilet Transfer   Type (Toilet Transfer) sit-stand   [place] Level (Toilet Transfer) contact guard   Toilet Transfer Comments per clinical judgement   Balance   Balance Assessment sitting static balance   Balance Comments SBA   Activities of Daily Living   BADL Assessment/Intervention bathing;upper body dressing;lower body dressing;toileting   Bathing Assessment/Intervention   [place] Level (Bathing) contact guard assist;minimum assist (75% patient effort);set up   Comment, (Bathing) per clinical judgement    Upper Body Dressing Assessment/Training   Comment, (Upper Body Dressing) per clinical judgement   [place] Level (Upper Body Dressing) supervision;set up   Lower Body Dressing Assessment/Training   Comment, (Lower Body Dressing) per clinical judgement   [place] Level (Lower Body Dressing) contact guard assist;set up   Toileting   Comment, (Toileting) per clinical judgement   [place] Level (Toileting) contact guard assist;set up   Clinical Impression   Criteria for Skilled Therapeutic Interventions Met (OT) Yes, treatment indicated   OT Diagnosis Decreased ADL IND/safety, functional mobility, cardiovascular endurance   OT Problem List-Impairments impacting ADL problems related to;activity tolerance impaired;balance;mobility;range of motion (ROM);strength;pain;post-surgical precautions;postural control   Assessment of Occupational Performance 5 or more Performance Deficits   Identified Performance Deficits dressing, bathing, toileting, functional mobility, IADL tasks, work, driving   Planned Therapy Interventions (OT) ADL retraining;IADL retraining;balance training;bed mobility training;cognition;ROM;strengthening;transfer training;progressive activity/exercise   Clinical Decision Making Complexity (OT) problem focused assessment/low complexity   Risk & Benefits of therapy have been explained evaluation/treatment results reviewed;care plan/treatment goals reviewed;risks/benefits reviewed;current/potential barriers reviewed;participants voiced agreement with care plan;participants included;patient   Clinical Impression Comments Pt will benefit from continued skilled OT/CR during IP stay to progress IND/safety and return to PLOF   OT Total Evaluation Time   OT Eval, Low Complexity Minutes (03384) 5   OT Goals   Therapy Frequency (OT) Daily   OT Predicted Duration/Target Date for Goal Attainment 07/03/24   OT Goals Upper Body Bathing;Lower Body Bathing;Toilet Transfer/Toileting;Cardiac Phase 1   OT: Upper  Body Bathing Modified independent;within precautions   OT: Lower Body Bathing Modified independent;with precautions   OT: Toilet Transfer/Toileting Modified independent;toilet transfer;within precautions   OT: Understanding of cardiac education to maximize quality of life, condition management, and health outcomes Patient;Caregiver;Verbalize;Demonstrate   OT: Perform aerobic activity with stable cardiovascular response continuous;5 minutes;10 minutes;ambulation;NuStep   OT: Functional/aerobic ambulation tolerance with stable cardiovascular response in order to return to home and community environment Modified independent;Within precautions;Greater than 300 feet   OT: Navigation of stairs simulating home set up with stable cardiovascular response in order to return to home and community environment Supervision/SBA;8 stairs   Cardiac Education   Education Provided Precautions   Education Packet Given to Patient No   All Patient Education Handouts Reviewed with Patient and/or Family No   OT Discharge Planning   OT Plan OT/CR: review precs, standing ADLs, functional mobility, CR, stairs when able   OT Discharge Recommendation (DC Rec) home with assist;home with outpatient cardiac rehab   OT Rationale for DC Rec Pt below baseline function for ADL tasks, functional mobility, overall activity tolerance. Doing well POD#1, ambulating in hallway with SBA-CGA, no AD. Anticipate with continued IP therapies, pt will be able to dc home with assist and OP CR to progress IND/safety and return to PLOF   OT Brief overview of current status SBA-CGA STS, hallway mobility, no AD   Total Session Time   Timed Code Treatment Minutes 43   Total Session Time (sum of timed and untimed services) 48

## 2024-06-12 NOTE — PLAN OF CARE
4A Physical Therapy - Per chart review and discussion with occupational therapy, Pt ambulating with SBA on POD #1 with no AD, pt was previously IND, with no current LOB or concerns for impairments preventing progression to discharge home, observed ambulating in hallway with OT. Plan for OT to continue to follow to address cardiac rehab and functional mobility as needed. Pt with no skilled inpatient PT needs, Will complete consult and defer evaluation, please reconsult as appropriate if patient has decline in functional mobility requiring further skilled inpatient PT needs. Defer Discharge recommendations to occupational therapy.

## 2024-06-12 NOTE — PHARMACY-ANTICOAGULATION SERVICE
Clinical Pharmacy - Warfarin Dosing Consult     Pharmacy has been consulted to manage this patient s warfarin therapy.  Indication: On-X Aortic Valve (Suggested goal INR 2.0-3.0 for first 3 months then INR 1.5-2.0)  Therapy Goal: INR 2-3  Provider/Team: CVTS  Warfarin Prior to Admission: No  Significant drug interactions: Aspirin, SQ heparin (increased risk of bleeding)    INR   Date Value Ref Range Status   06/12/2024 1.23 (H) 0.85 - 1.15 Final   06/11/2024 1.25 (H) 0.85 - 1.15 Final       Recommend warfarin 5 mg today.  Pharmacy will monitor Adam Garcia daily and order warfarin doses to achieve specified goal.      Please contact pharmacy as soon as possible if the warfarin needs to be held for a procedure or if the warfarin goals change.       Renetta Cameron, PharmD  CVICU and Advanced Heart Failure Pharmacist  Pager [phone]

## 2024-06-13 ENCOUNTER — APPOINTMENT (OUTPATIENT)
Dept: OCCUPATIONAL THERAPY | Facility: CLINIC | Age: 39
End: 2024-06-13
Attending: SURGERY
Payer: COMMERCIAL

## 2024-06-13 LAB
ANION GAP SERPL CALCULATED.3IONS-SCNC: 11 MMOL/L (ref 7–15)
BUN SERPL-MCNC: 6.8 MG/DL (ref 6–20)
CA-I BLD-MCNC: 4.4 MG/DL (ref 4.4–5.2)
CALCIUM SERPL-MCNC: 7.8 MG/DL (ref 8.6–10)
CHLORIDE SERPL-SCNC: 100 MMOL/L (ref 98–107)
CREAT SERPL-MCNC: 0.78 MG/DL (ref 0.67–1.17)
DEPRECATED HCO3 PLAS-SCNC: 24 MMOL/L (ref 22–29)
EGFRCR SERPLBLD CKD-EPI 2021: >90 ML/MIN/1.73M2
GLUCOSE SERPL-MCNC: 120 MG/DL (ref 70–99)
INR PPP: 1.15 (ref 0.85–1.15)
MAGNESIUM SERPL-MCNC: 1.7 MG/DL (ref 1.7–2.3)
PHOSPHATE SERPL-MCNC: 2 MG/DL (ref 2.5–4.5)
POTASSIUM SERPL-SCNC: 3.9 MMOL/L (ref 3.4–5.3)
POTASSIUM SERPL-SCNC: 3.9 MMOL/L (ref 3.4–5.3)
SODIUM SERPL-SCNC: 135 MMOL/L (ref 135–145)

## 2024-06-13 PROCEDURE — 84100 ASSAY OF PHOSPHORUS: CPT | Performed by: SURGERY

## 2024-06-13 PROCEDURE — 97530 THERAPEUTIC ACTIVITIES: CPT | Mod: GO

## 2024-06-13 PROCEDURE — 250N000011 HC RX IP 250 OP 636: Mod: JZ | Performed by: NURSE PRACTITIONER

## 2024-06-13 PROCEDURE — 250N000013 HC RX MED GY IP 250 OP 250 PS 637: Performed by: PHYSICIAN ASSISTANT

## 2024-06-13 PROCEDURE — 250N000013 HC RX MED GY IP 250 OP 250 PS 637: Performed by: NURSE PRACTITIONER

## 2024-06-13 PROCEDURE — 80048 BASIC METABOLIC PNL TOTAL CA: CPT | Performed by: NURSE PRACTITIONER

## 2024-06-13 PROCEDURE — 83735 ASSAY OF MAGNESIUM: CPT | Performed by: SURGERY

## 2024-06-13 PROCEDURE — 85610 PROTHROMBIN TIME: CPT | Performed by: STUDENT IN AN ORGANIZED HEALTH CARE EDUCATION/TRAINING PROGRAM

## 2024-06-13 PROCEDURE — 250N000011 HC RX IP 250 OP 636: Performed by: PHYSICIAN ASSISTANT

## 2024-06-13 PROCEDURE — 97110 THERAPEUTIC EXERCISES: CPT | Mod: GO

## 2024-06-13 PROCEDURE — 80051 ELECTROLYTE PANEL: CPT | Performed by: SURGERY

## 2024-06-13 PROCEDURE — 120N000003 HC R&B IMCU UMMC

## 2024-06-13 PROCEDURE — 82330 ASSAY OF CALCIUM: CPT | Performed by: PHYSICIAN ASSISTANT

## 2024-06-13 PROCEDURE — 250N000013 HC RX MED GY IP 250 OP 250 PS 637: Performed by: STUDENT IN AN ORGANIZED HEALTH CARE EDUCATION/TRAINING PROGRAM

## 2024-06-13 PROCEDURE — 36415 COLL VENOUS BLD VENIPUNCTURE: CPT | Performed by: PHYSICIAN ASSISTANT

## 2024-06-13 PROCEDURE — 250N000013 HC RX MED GY IP 250 OP 250 PS 637

## 2024-06-13 PROCEDURE — 250N000013 HC RX MED GY IP 250 OP 250 PS 637: Performed by: SURGERY

## 2024-06-13 RX ORDER — SIMETHICONE 80 MG
80 TABLET,CHEWABLE ORAL EVERY 6 HOURS PRN
Status: DISCONTINUED | OUTPATIENT
Start: 2024-06-13 | End: 2024-06-16 | Stop reason: HOSPADM

## 2024-06-13 RX ORDER — CETIRIZINE HYDROCHLORIDE 5 MG/1
5 TABLET ORAL DAILY
Status: DISCONTINUED | OUTPATIENT
Start: 2024-06-13 | End: 2024-06-16 | Stop reason: HOSPADM

## 2024-06-13 RX ORDER — FUROSEMIDE 10 MG/ML
20 INJECTION INTRAMUSCULAR; INTRAVENOUS ONCE
Status: COMPLETED | OUTPATIENT
Start: 2024-06-13 | End: 2024-06-13

## 2024-06-13 RX ORDER — FLUTICASONE PROPIONATE 50 MCG
1 SPRAY, SUSPENSION (ML) NASAL DAILY
Status: DISCONTINUED | OUTPATIENT
Start: 2024-06-13 | End: 2024-06-16 | Stop reason: HOSPADM

## 2024-06-13 RX ORDER — CALCIUM GLUCONATE 20 MG/ML
1 INJECTION, SOLUTION INTRAVENOUS ONCE
Status: CANCELLED | OUTPATIENT
Start: 2024-06-13 | End: 2024-06-13

## 2024-06-13 RX ORDER — WARFARIN SODIUM 5 MG/1
5 TABLET ORAL
Status: COMPLETED | OUTPATIENT
Start: 2024-06-13 | End: 2024-06-13

## 2024-06-13 RX ORDER — MAGNESIUM OXIDE 400 MG/1
400 TABLET ORAL EVERY 4 HOURS
Status: COMPLETED | OUTPATIENT
Start: 2024-06-13 | End: 2024-06-13

## 2024-06-13 RX ADMIN — ACETAMINOPHEN 650 MG: 325 TABLET, FILM COATED ORAL at 16:14

## 2024-06-13 RX ADMIN — OXYCODONE 10 MG: 10 TABLET ORAL at 00:26

## 2024-06-13 RX ADMIN — ACETAMINOPHEN 650 MG: 325 TABLET, FILM COATED ORAL at 04:18

## 2024-06-13 RX ADMIN — SIMETHICONE 80 MG: 80 TABLET, CHEWABLE ORAL at 13:43

## 2024-06-13 RX ADMIN — METHOCARBAMOL TABLETS 750 MG: 750 TABLET, COATED ORAL at 23:38

## 2024-06-13 RX ADMIN — HEPARIN SODIUM 5000 UNITS: 5000 INJECTION, SOLUTION INTRAVENOUS; SUBCUTANEOUS at 04:18

## 2024-06-13 RX ADMIN — POTASSIUM & SODIUM PHOSPHATES POWDER PACK 280-160-250 MG 1 PACKET: 280-160-250 PACK at 08:29

## 2024-06-13 RX ADMIN — ACETAMINOPHEN 650 MG: 325 TABLET, FILM COATED ORAL at 08:27

## 2024-06-13 RX ADMIN — Medication 12.5 MG: at 10:19

## 2024-06-13 RX ADMIN — MAGNESIUM OXIDE TAB 400 MG (241.3 MG ELEMENTAL MG) 400 MG: 400 (241.3 MG) TAB at 08:27

## 2024-06-13 RX ADMIN — HEPARIN SODIUM 5000 UNITS: 5000 INJECTION, SOLUTION INTRAVENOUS; SUBCUTANEOUS at 11:50

## 2024-06-13 RX ADMIN — WARFARIN SODIUM 5 MG: 5 TABLET ORAL at 18:11

## 2024-06-13 RX ADMIN — POTASSIUM & SODIUM PHOSPHATES POWDER PACK 280-160-250 MG 1 PACKET: 280-160-250 PACK at 16:15

## 2024-06-13 RX ADMIN — HEPARIN SODIUM 5000 UNITS: 5000 INJECTION, SOLUTION INTRAVENOUS; SUBCUTANEOUS at 20:22

## 2024-06-13 RX ADMIN — METHOCARBAMOL TABLETS 750 MG: 750 TABLET, COATED ORAL at 18:11

## 2024-06-13 RX ADMIN — POLYETHYLENE GLYCOL 3350 17 G: 17 POWDER, FOR SOLUTION ORAL at 08:29

## 2024-06-13 RX ADMIN — CETIRIZINE HYDROCHLORIDE 5 MG: 5 TABLET ORAL at 10:19

## 2024-06-13 RX ADMIN — Medication 12.5 MG: at 20:22

## 2024-06-13 RX ADMIN — FUROSEMIDE 20 MG: 10 INJECTION, SOLUTION INTRAMUSCULAR; INTRAVENOUS at 10:19

## 2024-06-13 RX ADMIN — METHOCARBAMOL TABLETS 750 MG: 750 TABLET, COATED ORAL at 11:41

## 2024-06-13 RX ADMIN — CALCIUM GLUCONATE 1 G: 20 INJECTION, SOLUTION INTRAVENOUS at 10:21

## 2024-06-13 RX ADMIN — ASPIRIN 81 MG CHEWABLE TABLET 81 MG: 81 TABLET CHEWABLE at 08:27

## 2024-06-13 RX ADMIN — DOCUSATE SODIUM 50 MG AND SENNOSIDES 8.6 MG 2 TABLET: 8.6; 5 TABLET, FILM COATED ORAL at 08:28

## 2024-06-13 RX ADMIN — METHOCARBAMOL TABLETS 750 MG: 750 TABLET, COATED ORAL at 05:53

## 2024-06-13 RX ADMIN — ACETAMINOPHEN 650 MG: 325 TABLET, FILM COATED ORAL at 22:31

## 2024-06-13 RX ADMIN — POTASSIUM & SODIUM PHOSPHATES POWDER PACK 280-160-250 MG 1 PACKET: 280-160-250 PACK at 10:34

## 2024-06-13 RX ADMIN — MAGNESIUM OXIDE TAB 400 MG (241.3 MG ELEMENTAL MG) 400 MG: 400 (241.3 MG) TAB at 10:34

## 2024-06-13 RX ADMIN — PANTOPRAZOLE SODIUM 40 MG: 40 TABLET, DELAYED RELEASE ORAL at 08:27

## 2024-06-13 RX ADMIN — DOCUSATE SODIUM 50 MG AND SENNOSIDES 8.6 MG 2 TABLET: 8.6; 5 TABLET, FILM COATED ORAL at 20:22

## 2024-06-13 RX ADMIN — METHOCARBAMOL TABLETS 750 MG: 750 TABLET, COATED ORAL at 00:26

## 2024-06-13 ASSESSMENT — ACTIVITIES OF DAILY LIVING (ADL)
ADLS_ACUITY_SCORE: 24
ADLS_ACUITY_SCORE: 25
ADLS_ACUITY_SCORE: 24

## 2024-06-13 NOTE — PLAN OF CARE
D/I/A:  Pt up with SBA of one.  Tolerating activity well.  A+Ox4 and making needs known.  Pleasant and cooperative.  VSS, afebrile.  CTs x2 to sxn with no noted air leak; scant output.  Medicated effectively for post surgical pain with current med orders.  Voiding in large amounts; bowel meds given, no BM this evening.  Family at bedside for part of evening and supportive.  P: Continue to monitor status.

## 2024-06-13 NOTE — PROGRESS NOTES
CHW scheduled PCP appt with INR lab Wednesday 6/19 at 10:45am Dr. Charity De La O.   [facility]  [address]  [place] [address]  # [phone]   F # [phone]      Emili Coreas   6A/B/C Community Health Worker   Phone: [phone]

## 2024-06-13 NOTE — PROGRESS NOTES
D/continues on scheduled tylenol and methocarbinol  A/incision is healing, dressings are CDI,   P/monitor for changes, keep him and team updated

## 2024-06-13 NOTE — PROGRESS NOTES
Cardiovascular Surgery Progress Note  06/13/2024         Assessment and Plan:     Adam Garcia is a 38 year old male with a PMH of tobacco abuse, ADHD, severe AR. Patient is now s/p aortic valve replacement with mechanical OnX valve on 6/11/24 with Dr. Nehemiah Kat.    Cardiovascular:   Hx of severe aortic insufficiency s/p aortic valve replacement, mechanical OnX  HD stable, in NSR/ST.  Most recent echo demonstrated preserved biventricular function and noted aortic valvular abnormality  ASA 81 mg daily  Statin not indicated  BB: metoprolol tartrate 12.5 mg BID with hold parameters  Diuresis as below  AC as below  Will need postop baseline echo prior to discharge    Chest tubes: in place to suction, will remove today after ambulation  TPW: will remove today    Pulmonary:  Tobacco abuse  Extubated in OR. Now saturating well on RA   Continue aggressive pulm hygiene, IS, activity and deep breathing  PTA zyrtec and flonase resumed  Encourage cessation    Neurology / MSK:  Acute post-operative pain   ADHD  Pain well controlled with current regimen  Resume PTA Adderall      / Renal / Fluid / Electrolytes:  Hypervolemia  BL creat ~ 0.8, most recent creatinine stable; adequate UOP.   FLUID STATUS: Pre-op weight 236 lbs, weight today 237 lbs; I/O past 24 hours: -1.6 L  Diuresis: lasix 20 mg IV x1  Replete lytes per protocol  Strict I/O, daily weights  Avoid/limit nephrotoxins as able    GI / Nutrition:   Risk for protein calorie malnutrition  Tolerating regular diet, supplements  Continue bowel regimen, no BM yet, +flatus    Endocrine:  Stress induced hyperglycemia   Hgb A1c 5.2%  Initially managed on insulin drip postop, transitioned to sliding scale  Goal BG <180 for optimal healing    Infectious Disease:  Stress induced leukocytosis  WBC pending, remains afebrile, no signs or symptoms of infection  Completed perioperative antibiotics  Continue to monitor fever curve, CBC    Hematology:   Acute blood loss  "anemia  Acute blood loss thrombocytopenia  Hgb pending; Plt pending, no signs or symptoms of active bleeding  CBC in AM    Anticoagulation:   ASA 81 mg daily  Coumadin for mechanical OnX AVR, INR goal 2-3 the first 3 months then 1.5-2 lifelong thereafter. Most recent INR 1.15.      MSK/Skin:  Sternotomy  Surgical incision  Sternal precautions  Incisional cares per protocol    Prophylaxis:   Stress ulcer prophylaxis: Pantoprazole 40 mg daily  DVT prophylaxis: Subcutaneous heparin, SCD    Medically Ready for Discharge: Anticipated in 2-4 Days         # Hypocalcemia: Lowest Ca = 8.1 mg/dL in last 2 days, will monitor and replace as appropriate                      # Obesity: Estimated body mass index is 36.05 kg/m  as calculated from the following:    Height as of this encounter: 1.727 m (5' 8\").    Weight as of this encounter: 107.5 kg (237 lb 1.6 oz)., PRESENT ON ADMISSION              Disposition:   Transferred to  on 6/12  Therapies recommending discharge to home    Dr. Kat has been informed of changes through both verbal and written communication.      CATHERINE Heard, ACN-AG, CCRN  Nurse Practitioner  Cardiothoracic Surgery  Pager: [phone]        Interval History:     No overnight events.  States pain is well managed on current regimen. Slept well overnight.  Tolerating diet, is passing flatus, no  BM. No nausea or vomiting.  Breathing well without complaints.   Working with therapies and ambulating in halls with assistance.   Denies chest pain, palpitations, dizziness, syncopal symptoms, fevers, chills, myalgias, or sternal popping/clicking.         Physical Exam:     Gen: A&Ox4, NAD  Neuro: Intact with no focal deficits   CV: RRR, normal S1 S2, no murmurs, rubs or gallops. no JVD  Pulm: CTA, no wheezing or rhonchi, normal breathing on RA  Abd: nondistended, normal BS, soft, nontender  Ext: mild peripheral edema, no pitting  Incision: clean, dry, intact, no erythema, sternum stable  Tubes/drain " "sites: dressing clean and dry, serosanguinous output, no air leak         Data:    /82 (BP Location: Left arm)   Pulse 115   Temp 98.6  F (37  C) (Oral)   Resp 19   Ht 1.727 m (5' 8\")   Wt 107.5 kg (237 lb 1.6 oz)   SpO2 94%   BMI 36.05 kg/m         Intake/Output Summary (Last 24 hours) at 6/13/2024 0945  Last data filed at 6/13/2024 0800  Gross per 24 hour   Intake 1940 ml   Output 4850 ml   Net -2910 ml        Imaging:  reviewed recent imaging, no acute concerns    No results found for this or any previous visit (from the past 24 hour(s)).     Labs:  Recent Labs   Lab 06/13/24  0441 06/12/24  1943 06/12/24  1636 06/12/24  1601 06/12/24  0915 06/12/24  0912 06/12/24  0446 06/12/24  0423 06/11/24  1448 06/11/24  1318 06/11/24  1316 06/11/24  1222 06/11/24  1143   WBC  --   --   --   --   --   --   --  10.3  --  13.1*  --   --  9.2   HGB  --   --   --   --   --   --   --  13.2*  --  13.2*  --  12.5* 11.8*   MCV  --   --   --   --   --   --   --  85  --  84  --   --  85   PLT  --   --   --   --   --   --   --  196  --  176  --   --  162   INR 1.15  --   --   --   --  1.23*  --   --   --  1.25*  --   --  1.38*   NA  --   --   --  135  --   --   --  135  --  139  --  139  --    POTASSIUM 3.9  --   --  3.9  --   --   --  4.2  --  4.4   < > 4.3  --    CHLORIDE  --   --   --  99  --   --   --  102  --  107  --   --   --    CO2  --   --   --  27  --   --   --  25  --  24  --   --   --    BUN  --   --   --  8.5  --   --   --  6.3  --  10.4  --   --   --    CR  --   --   --  0.82  --   --   --  0.70  --  0.84  --   --   --    ANIONGAP  --   --   --  9  --   --   --  8  --  8  --   --   --    MARKUS  --   --   --  8.5*  --   --   --  8.5*  --  8.1*  --   --   --    GLC  --  136* 137* 144*   < >  --    < > 159*   < > 137*   < > 115*  --    ALBUMIN  --   --   --   --   --   --   --  3.8  --  3.8  --   --   --    PROTTOTAL  --   --   --   --   --   --   --  6.0*  --  5.6*  --   --   --    BILITOTAL  --   --   --   --   " --   --   --  0.7  --  0.7  --   --   --    ALKPHOS  --   --   --   --   --   --   --  52  --  57  --   --   --    ALT  --   --   --   --   --   --   --  12  --  12  --   --   --    AST  --   --   --   --   --   --   --  28  --  27  --   --   --     < > = values in this interval not displayed.      GLUCOSE:   Recent Labs   Lab 06/12/24  1943 06/12/24  1636 06/12/24  1601 06/12/24  0915 06/12/24  0446 06/12/24  0423   * 137* 144* 123* 156* 159*

## 2024-06-13 NOTE — PLAN OF CARE
"Goal Outcome Evaluation:    /77   Pulse 109   Temp 98.6  F (37  C) (Oral)   Resp 19   Ht 1.727 m (5' 8\")   Wt 107.5 kg (237 lb 1.6 oz)   SpO2 98%   BMI 36.05 kg/m      Neuro: A&Ox4. Able to make needs known. Intermittent tingling in L hand.   Cardiac: Afebrile, VSS. SR/ST. Valve click.  Respiratory: RA  GI/: Voiding spontaneously. IV lasix given.  No BM this shift. LBM 6/10.  Diet/appetite: Tolerating regular diet. Denies nausea.  Activity: Up with 1 assist, slept in recliner to help with comfort / decrease back pain.  Plans to walk with PT this morning.  Pain: incisional/ upper back pain. PRN oxy & robaxin scheduled.   Skin: Sternal incision, Preventative foam on sacrum. CT dressings CDI.  CT dressing due to be changed.  Lines: L PIV.  Drains: Pleural & mediastinal CT split into separate atriums,-20 suction.  Plan: Continue w POC.  Plans to remove Chest tubes at bedside today.    "

## 2024-06-13 NOTE — PLAN OF CARE
Neuro: A&Ox4. Able to make needs known. Tingling in L hand.   Cardiac: Afebrile, VSS. SR/ST. Valve click   Respiratory: RA  GI/: Voiding spontaneously. No BM this shift. LBM 6/10.  Diet/appetite: Tolerating regular diet. Denies nausea.  Activity: Up with 1 assist, slept in recliner to help with comfort / decrease back pain.    Pain: incisional/ upper back pain. PRN oxy & robaxin scheduled.   Skin: Sternal incision, Preventative foam on sacrum. CT dressings CDI.  Lines: L PIV.  Drains: Pleural & mediastinal CT split into separate atriums. -20 SX.  Plan: Continue w POC, report changes to CVTS.

## 2024-06-14 ENCOUNTER — APPOINTMENT (OUTPATIENT)
Dept: GENERAL RADIOLOGY | Facility: CLINIC | Age: 39
End: 2024-06-14
Attending: NURSE PRACTITIONER
Payer: COMMERCIAL

## 2024-06-14 ENCOUNTER — APPOINTMENT (OUTPATIENT)
Dept: CARDIOLOGY | Facility: CLINIC | Age: 39
End: 2024-06-14
Attending: NURSE PRACTITIONER
Payer: COMMERCIAL

## 2024-06-14 ENCOUNTER — APPOINTMENT (OUTPATIENT)
Dept: OCCUPATIONAL THERAPY | Facility: CLINIC | Age: 39
End: 2024-06-14
Attending: SURGERY
Payer: COMMERCIAL

## 2024-06-14 LAB
ANION GAP SERPL CALCULATED.3IONS-SCNC: 10 MMOL/L (ref 7–15)
BUN SERPL-MCNC: 6.3 MG/DL (ref 6–20)
CALCIUM SERPL-MCNC: 8.9 MG/DL (ref 8.6–10)
CHLORIDE SERPL-SCNC: 102 MMOL/L (ref 98–107)
CREAT SERPL-MCNC: 0.65 MG/DL (ref 0.67–1.17)
DEPRECATED HCO3 PLAS-SCNC: 24 MMOL/L (ref 22–29)
EGFRCR SERPLBLD CKD-EPI 2021: >90 ML/MIN/1.73M2
ERYTHROCYTE [DISTWIDTH] IN BLOOD BY AUTOMATED COUNT: 13.2 % (ref 10–15)
GLUCOSE SERPL-MCNC: 109 MG/DL (ref 70–99)
HCT VFR BLD AUTO: 35.1 % (ref 40–53)
HGB BLD-MCNC: 12.7 G/DL (ref 13.3–17.7)
INR PPP: 1.37 (ref 0.85–1.15)
LVEF ECHO: NORMAL
MAGNESIUM SERPL-MCNC: 1.8 MG/DL (ref 1.7–2.3)
MCH RBC QN AUTO: 30.5 PG (ref 26.5–33)
MCHC RBC AUTO-ENTMCNC: 36.2 G/DL (ref 31.5–36.5)
MCV RBC AUTO: 84 FL (ref 78–100)
PATH REPORT.COMMENTS IMP SPEC: NORMAL
PATH REPORT.COMMENTS IMP SPEC: NORMAL
PATH REPORT.FINAL DX SPEC: NORMAL
PATH REPORT.GROSS SPEC: NORMAL
PATH REPORT.MICROSCOPIC SPEC OTHER STN: NORMAL
PATH REPORT.RELEVANT HX SPEC: NORMAL
PHOSPHATE SERPL-MCNC: 2.6 MG/DL (ref 2.5–4.5)
PHOTO IMAGE: NORMAL
PLATELET # BLD AUTO: 168 10E3/UL (ref 150–450)
POTASSIUM SERPL-SCNC: 4.1 MMOL/L (ref 3.4–5.3)
RBC # BLD AUTO: 4.16 10E6/UL (ref 4.4–5.9)
SODIUM SERPL-SCNC: 136 MMOL/L (ref 135–145)
WBC # BLD AUTO: 7.8 10E3/UL (ref 4–11)

## 2024-06-14 PROCEDURE — 250N000013 HC RX MED GY IP 250 OP 250 PS 637: Performed by: STUDENT IN AN ORGANIZED HEALTH CARE EDUCATION/TRAINING PROGRAM

## 2024-06-14 PROCEDURE — 93308 TTE F-UP OR LMTD: CPT | Mod: 26 | Performed by: STUDENT IN AN ORGANIZED HEALTH CARE EDUCATION/TRAINING PROGRAM

## 2024-06-14 PROCEDURE — 93325 DOPPLER ECHO COLOR FLOW MAPG: CPT

## 2024-06-14 PROCEDURE — 250N000013 HC RX MED GY IP 250 OP 250 PS 637: Performed by: NURSE PRACTITIONER

## 2024-06-14 PROCEDURE — 71046 X-RAY EXAM CHEST 2 VIEWS: CPT

## 2024-06-14 PROCEDURE — 93321 DOPPLER ECHO F-UP/LMTD STD: CPT | Mod: 26 | Performed by: STUDENT IN AN ORGANIZED HEALTH CARE EDUCATION/TRAINING PROGRAM

## 2024-06-14 PROCEDURE — 120N000003 HC R&B IMCU UMMC

## 2024-06-14 PROCEDURE — 97110 THERAPEUTIC EXERCISES: CPT | Mod: GO

## 2024-06-14 PROCEDURE — 36415 COLL VENOUS BLD VENIPUNCTURE: CPT | Performed by: NURSE PRACTITIONER

## 2024-06-14 PROCEDURE — 84100 ASSAY OF PHOSPHORUS: CPT | Performed by: SURGERY

## 2024-06-14 PROCEDURE — 250N000013 HC RX MED GY IP 250 OP 250 PS 637: Performed by: SURGERY

## 2024-06-14 PROCEDURE — 93325 DOPPLER ECHO COLOR FLOW MAPG: CPT | Mod: 26 | Performed by: STUDENT IN AN ORGANIZED HEALTH CARE EDUCATION/TRAINING PROGRAM

## 2024-06-14 PROCEDURE — 85027 COMPLETE CBC AUTOMATED: CPT | Performed by: NURSE PRACTITIONER

## 2024-06-14 PROCEDURE — 250N000013 HC RX MED GY IP 250 OP 250 PS 637: Performed by: PHYSICIAN ASSISTANT

## 2024-06-14 PROCEDURE — 71046 X-RAY EXAM CHEST 2 VIEWS: CPT | Mod: 26 | Performed by: RADIOLOGY

## 2024-06-14 PROCEDURE — 83735 ASSAY OF MAGNESIUM: CPT | Performed by: NURSE PRACTITIONER

## 2024-06-14 PROCEDURE — 255N000002 HC RX 255 OP 636: Performed by: STUDENT IN AN ORGANIZED HEALTH CARE EDUCATION/TRAINING PROGRAM

## 2024-06-14 PROCEDURE — 85610 PROTHROMBIN TIME: CPT | Performed by: STUDENT IN AN ORGANIZED HEALTH CARE EDUCATION/TRAINING PROGRAM

## 2024-06-14 PROCEDURE — 80048 BASIC METABOLIC PNL TOTAL CA: CPT | Performed by: NURSE PRACTITIONER

## 2024-06-14 PROCEDURE — 97535 SELF CARE MNGMENT TRAINING: CPT | Mod: GO

## 2024-06-14 PROCEDURE — 250N000011 HC RX IP 250 OP 636: Performed by: PHYSICIAN ASSISTANT

## 2024-06-14 PROCEDURE — 250N000013 HC RX MED GY IP 250 OP 250 PS 637

## 2024-06-14 RX ORDER — METOPROLOL TARTRATE 25 MG/1
25 TABLET, FILM COATED ORAL 2 TIMES DAILY
Status: DISCONTINUED | OUTPATIENT
Start: 2024-06-14 | End: 2024-06-16 | Stop reason: HOSPADM

## 2024-06-14 RX ORDER — DEXTROAMPHETAMINE SACCHARATE, AMPHETAMINE ASPARTATE, DEXTROAMPHETAMINE SULFATE AND AMPHETAMINE SULFATE 2.5; 2.5; 2.5; 2.5 MG/1; MG/1; MG/1; MG/1
30 TABLET ORAL 2 TIMES DAILY
Status: DISCONTINUED | OUTPATIENT
Start: 2024-06-14 | End: 2024-06-14

## 2024-06-14 RX ORDER — WARFARIN SODIUM 3 MG/1
6 TABLET ORAL
Status: COMPLETED | OUTPATIENT
Start: 2024-06-14 | End: 2024-06-14

## 2024-06-14 RX ORDER — DEXTROAMPHETAMINE SACCHARATE, AMPHETAMINE ASPARTATE, DEXTROAMPHETAMINE SULFATE AND AMPHETAMINE SULFATE 2.5; 2.5; 2.5; 2.5 MG/1; MG/1; MG/1; MG/1
30 TABLET ORAL
Status: DISCONTINUED | OUTPATIENT
Start: 2024-06-15 | End: 2024-06-16 | Stop reason: HOSPADM

## 2024-06-14 RX ADMIN — METHOCARBAMOL TABLETS 750 MG: 750 TABLET, COATED ORAL at 18:16

## 2024-06-14 RX ADMIN — DOCUSATE SODIUM 50 MG AND SENNOSIDES 8.6 MG 2 TABLET: 8.6; 5 TABLET, FILM COATED ORAL at 21:25

## 2024-06-14 RX ADMIN — POTASSIUM & SODIUM PHOSPHATES POWDER PACK 280-160-250 MG 1 PACKET: 280-160-250 PACK at 12:02

## 2024-06-14 RX ADMIN — METHOCARBAMOL TABLETS 750 MG: 750 TABLET, COATED ORAL at 05:12

## 2024-06-14 RX ADMIN — ACETAMINOPHEN 650 MG: 325 TABLET, FILM COATED ORAL at 05:09

## 2024-06-14 RX ADMIN — CETIRIZINE HYDROCHLORIDE 5 MG: 5 TABLET ORAL at 08:59

## 2024-06-14 RX ADMIN — DEXTROAMPHETAMINE SACCHARATE, AMPHETAMINE ASPARTATE, DEXTROAMPHETAMINE SULFATE AND AMPHETAMINE SULFATE 30 MG: 2.5; 2.5; 2.5; 2.5 TABLET ORAL at 08:59

## 2024-06-14 RX ADMIN — ASPIRIN 81 MG CHEWABLE TABLET 81 MG: 81 TABLET CHEWABLE at 08:59

## 2024-06-14 RX ADMIN — METHOCARBAMOL TABLETS 750 MG: 750 TABLET, COATED ORAL at 23:30

## 2024-06-14 RX ADMIN — HEPARIN SODIUM 5000 UNITS: 5000 INJECTION, SOLUTION INTRAVENOUS; SUBCUTANEOUS at 12:02

## 2024-06-14 RX ADMIN — PANTOPRAZOLE SODIUM 40 MG: 40 TABLET, DELAYED RELEASE ORAL at 08:59

## 2024-06-14 RX ADMIN — WARFARIN SODIUM 6 MG: 3 TABLET ORAL at 18:16

## 2024-06-14 RX ADMIN — METHOCARBAMOL TABLETS 750 MG: 750 TABLET, COATED ORAL at 12:02

## 2024-06-14 RX ADMIN — POTASSIUM & SODIUM PHOSPHATES POWDER PACK 280-160-250 MG 1 PACKET: 280-160-250 PACK at 08:59

## 2024-06-14 RX ADMIN — METOPROLOL TARTRATE 25 MG: 25 TABLET, FILM COATED ORAL at 08:59

## 2024-06-14 RX ADMIN — METOPROLOL TARTRATE 25 MG: 25 TABLET, FILM COATED ORAL at 21:26

## 2024-06-14 RX ADMIN — ACETAMINOPHEN 650 MG: 325 TABLET, FILM COATED ORAL at 21:25

## 2024-06-14 RX ADMIN — ACETAMINOPHEN 650 MG: 325 TABLET, FILM COATED ORAL at 10:07

## 2024-06-14 RX ADMIN — HEPARIN SODIUM 5000 UNITS: 5000 INJECTION, SOLUTION INTRAVENOUS; SUBCUTANEOUS at 05:09

## 2024-06-14 RX ADMIN — HEPARIN SODIUM 5000 UNITS: 5000 INJECTION, SOLUTION INTRAVENOUS; SUBCUTANEOUS at 21:26

## 2024-06-14 RX ADMIN — POLYETHYLENE GLYCOL 3350 17 G: 17 POWDER, FOR SOLUTION ORAL at 08:58

## 2024-06-14 RX ADMIN — HUMAN ALBUMIN MICROSPHERES AND PERFLUTREN 6 ML: 10; .22 INJECTION, SOLUTION INTRAVENOUS at 10:18

## 2024-06-14 RX ADMIN — DOCUSATE SODIUM 50 MG AND SENNOSIDES 8.6 MG 2 TABLET: 8.6; 5 TABLET, FILM COATED ORAL at 08:58

## 2024-06-14 RX ADMIN — ACETAMINOPHEN 650 MG: 325 TABLET, FILM COATED ORAL at 16:03

## 2024-06-14 ASSESSMENT — ACTIVITIES OF DAILY LIVING (ADL)
ADLS_ACUITY_SCORE: 24
ADLS_ACUITY_SCORE: 22
ADLS_ACUITY_SCORE: 22
ADLS_ACUITY_SCORE: 24
ADLS_ACUITY_SCORE: 24
ADLS_ACUITY_SCORE: 23
ADLS_ACUITY_SCORE: 24
ADLS_ACUITY_SCORE: 23
ADLS_ACUITY_SCORE: 22
ADLS_ACUITY_SCORE: 22
ADLS_ACUITY_SCORE: 24
ADLS_ACUITY_SCORE: 22
ADLS_ACUITY_SCORE: 22
ADLS_ACUITY_SCORE: 24
ADLS_ACUITY_SCORE: 22
ADLS_ACUITY_SCORE: 24
ADLS_ACUITY_SCORE: 24
ADLS_ACUITY_SCORE: 22
ADLS_ACUITY_SCORE: 24
ADLS_ACUITY_SCORE: 22

## 2024-06-14 NOTE — PROGRESS NOTES
D/He is up in recliner and family is here. He had a BM today, and continues on scheduled tylenol and robaxin.   A/incision is healing well, dry dressing on top of chest incision due to beard.   I/showered tonight, and says he need the time of his Adderal changed to 10 am and 2 pm returned med to [facility] and will call team to change this med-called and gave message to team  P/monitor for changes, keep him and team updated with changes. Monitor INR so he can go home therapeutic

## 2024-06-14 NOTE — DISCHARGE SUMMARY
[facility], [place]   Cardiothoracic Surgery Hospital Discharge Summary     Adam Garcia MRN# [id number]   Age: 38 year old YOB: 1985     Admitting Physician:  Nehemiah Kat MD  Discharge Physician:  Eden Collins PA-C  Primary Care Physician:        Bisi Mccollum     DATE OF ADMISSION: 6/11/2024      DATE OF DISCHARGE: June 16, 2024     Admit Wt: 236 lbs  Discharge Wt: 229 lbs          Primary Diagnoses:   Hx of severe aortic insufficiency s/p aortic valve replacement, mechanical OnX valve  Anticoagulation: Lifelong warfarin therapy for mechanical AVR. Goal INR 2-3 first three months then 1.5-2 lifelong thereafter          Secondary Diagnoses:   Acute postoperative pain, improving  Stress induced hyperglycemia, resolved  Stress induced leukocytosis, resolved  Acute blood loss anemia, stable  Acute blood loss thrombocytopenia, resolved  Hypervolemia, resolved  Hx of tobacco misuse    PROCEDURES PERFORMED:   Date: 6/11/24.  Surgeon: Dr. Nehemiah Kat  Aortic valve replacement with 27/29 mm ARTIVION On-X mechanical valve, intraoperative YOHANNES     INTRAOPERATIVE FINDINGS:    The patient had a overall normal LV systolic function but the LV was mildly dilated. His aortic root anatomy and acing aorta was normal. 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 The right coronary cusp appeared normal. Given the unusual complexity of his valve anatomy I felt that attempting repair and repair in such valve would not likely to have a guaranteeing long-term durability and I therefore replaced it with a mechanical valve.     PATHOLOGY RESULTS:    A AORTIC VALVE AND LEAFLETS:  - Valvular tissue with myxoid change and nodular fibrosis     CULTURE RESULTS:    none    CONSULTS:    PT/OT  Intensivist    BRIEF HISTORY OF  ILLNESS:     Mr. Garcia is a very pleasant otherwise healthy 38-year-old male with no significant past medical history who was recently found to have severe aortic valve insufficiency from what appeared to be noncoronary cusp leaflet prolapse.  His left ventricle was mildly dilated even though the systolic function was preserved and he also had worsening dyspnea on exertion and fatigue.  He was taken to the operating for a possible aortic valve repair versus likely replacement with a mechanical valve.     HOSPITAL COURSE: Adam Garcia is a 38 year old male who on 6/11/2024 underwent the above-named procedures and tolerated the operation well.     Postoperatively was admitted to the CVICU.  Patient was extubated within protocol in the operating room.  Blood pressure and cardiac index were managed with vasopressors and inotropic agents which were continuously weaned until no longer needed.  Patient was subsequently  transferred to the surgical telemetry floor.    While on the surgical unit, the patient continued to progress well. Chest tubes and temporary pacemaker wires were removed when deemed appropriate.     Patient was fluid overloaded and treated with diuretics. He is 7 lbs below preoperative weight and will discharge without any diuretic therapy. We will re-evaluate need in clinic follow-up.     On day prior to discharge. Patient developed ~1 hour of rate-controlled atrial fibrillation. He spontaneously converted to normal sinus rhythm without intervention. He was hemodynamically stable and asymptomatic throughout. As patient is already on beta blockade, afib was rate-controlled, and anticoagulation has been started for mechanical valve, no further treatment was initiated. No plan for outpatient monitor unless concern for recurrence.     His PTA Adderall was resumed in the hospital and patient tolerated this well. Primary care may resume management of Adderall at discharge.    Patient was started on  "warfarin for mechanical aortic valve. Patient tolerated anticoagulation well without evidence of bleeding. On date of discharge, INR was 1.88 and trending up. Dr. Kat was supportive of discharge as INR was trending up. He has an INR check on 6/18.      Patient was transiently hyperglycemic and treated with insulin infusion then transitioned to sliding scale insulin per protocol. Blood sugars remained stable. No further glycemic control agents needed at this time.     Prior to discharge, his pain was controlled well, he was working well with therapies, able to perform most ADLs, ambulate without difficulty, and had full return of bowel and bladder function.  On June 16, 2024, he was discharged to home with spouse in stable condition. Follow up with cardiology and cardiac surgery have been arranged. Pt encouraged to follow up with PCP and cardiac rehab upon discharge.    Patient discharged on aspirin:  Yes 81 mg  Patient discharged on beta blocker: yes    Patient discharged on ACE Inhibitor/ARB: no - not indicated    Patient discharged on statin: N/A         Discharge Disposition:     Discharged to home            Condition on Discharge:     Discharge condition: Stable   Discharge vitals: Blood pressure 116/82, pulse 83, temperature 98  F (36.7  C), temperature source Oral, resp. rate 16, height 1.727 m (5' 8\"), weight 103.9 kg (229 lb 1.6 oz), SpO2 97%.   Code status on discharge: Full Code     Vitals:    06/14/24 0505 06/15/24 0435 06/16/24 0030   Weight: 105.2 kg (232 lb) 104.3 kg (230 lb) 103.9 kg (229 lb 1.6 oz)       DAY OF DISCHARGE PHYSICAL EXAM:    Gen: A&Ox4, NAD  Neuro: Intact with no focal deficits   CV: RRR, normal S1 S2, no murmurs, rubs or gallops. no JVD  Pulm: CTA, no wheezing or rhonchi, normal breathing on RA  Abd: nondistended, normal BS, soft, nontender  Ext: no peripheral edema, no pitting  Skin:  Sternal incision: clean, dry, intact, no erythema, sternum stable  LVH sites:  Groin " incisions:  Tubes/drain sites: dressing clean and dry    LABS  Most Recent 3 CBC's:  Recent Labs   Lab Test 24  0906 06/15/24  0725 06/15/24  0555   WBC 5.3 7.0 6.4   HGB 11.6* 12.2* 12.5*   MCV 85 86 86    206 206      Most Recent 3 BMP's:  Recent Labs   Lab Test 24  0906 24  0423 06/15/24  0555 24  0703   *  --  138 136   POTASSIUM 4.4 4.0 4.0 4.1   CHLORIDE 102  --  103 102   CO2 23  --  26 24   BUN 8.1  --  8.8 6.3   CR 0.69  --  0.69 0.65*   ANIONGAP 9  --  9 10   MARKUS 8.9  --  9.1 8.9   GLC 97  --  102* 109*     Most Recent 2 LFT's:  Recent Labs   Lab Test 24  0423 24  1318   AST 28 27   ALT 12 12   ALKPHOS 52 57   BILITOTAL 0.7 0.7     Most Recent INR's and Anticoagulation Dosing History:  Anticoagulation Dose History          Latest Ref Rng & Units 2024   Recent Dosing and Labs   warfarin ANTICOAGULANT (COUMADIN) 5 MG tablet - - 5 mg, $Given 5 mg, $Given -   INR 0.85 - 1.15 1.25  1.38  1.23  1.15  1.37      Most Recent 3 Troponin's:No lab results found.  Most Recent Cholesterol Panel:No lab results found.  Most Recent 6 Bacteria Isolates From Any Culture (See EPIC Reports for Culture Details):No lab results found.  Most Recent TSH, T4 and A1c Labs:  Recent Labs   Lab Test 24  0918   A1C 5.2      Recent Labs   Lab 24  0906 06/15/24  0555 24  0703 24  0441 24  1943 24  1636   GLC 97 102* 109* 120* 136* 137*       Imagin24 Echocardiogram:  Interpretation Summary  Left ventricular function is decreased. The ejection fraction is 50-55%  (borderline).Abnormal non-specific septal motion is present.  Global right ventricular function is normal.  History of bicuspid aortic valve s/p Aortic valve replacement with 27/29 mm  ARTIVION On-X mechanical valve 24. The valve is well seated there is  trace para valvular regurgitation. Doppler interrogation of the valve is  normal with Vm 1.8m/s  MG 8mmHg  IVC diameter <2.1 cm collapsing >50% with sniff suggests a normal RA pressure  of 3 mmHg.  No pericardial effusion is present.  This study was compared with the study from 3/28/24 the patient is s/p AVR,  LVEF is slightly lower .  ______________________________________________________________________________  Left Ventricle  Left ventricular size is normal. Left ventricular function is decreased. The  ejection fraction is 50-55% (borderline). Abnormal non-specific septal motion  is present.     Right Ventricle  The right ventricle is normal size. Global right ventricular function is  normal.     Mitral Valve  The mitral valve is normal.     Aortic Valve  History of bicuspid aortic valve s/p Aortic valve replacement with 27/29 mm  ARTIVION On-X mechanical valve 6/11/24. The valve is well seated there is  trace para valvular regurgitation. doppler interrogation of the valve is  normal with Vm 1.8m/s MG 8mmHg.     Tricuspid Valve  The peak velocity of the tricuspid regurgitant jet is not obtainable.     Pulmonic Valve  The valve leaflets are not well visualized.     Vessels  The aorta root is normal. IVC diameter <2.1 cm collapsing >50% with sniff  suggests a normal RA pressure of 3 mmHg.     Pericardium  No pericardial effusion is present.    Chest radiograph 6/14:  FINDINGS:   Stable postsurgical changes of aortic valve replacement with midline  sternotomy wires intact. Interval removal of right IJ central venous  catheter and mediastinal drains. Stable position of artivion aortic  valve. No evidence of pneumothorax, focal consolidation or pleural  effusion. Stable cardiomegaly. Trachea is midline. No abnormal  calcifications. Upper abdomen within normal limits.                                                                      IMPRESSION:   Stable postsurgical changes of valve replacement with interval removal  of right IJ central venous catheter and mediastinal drains. No  evidence of  pneumothorax.      PRE-ADMISSION MEDICATIONS:  Medications Prior to Admission   Medication Sig Dispense Refill Last Dose    aluminum chloride (DRYSOL) 20 % external solution Apply topically daily as needed (Sweating)   More than a month    amphetamine-dextroamphetamine (ADDERALL) 30 MG tablet Take 30 mg by mouth 2 times daily   6/10/2024    cetirizine (ZYRTEC) 5 MG tablet Take 5 mg by mouth every morning   6/10/2024    clindamycin (CLEOCIN T) 1 % external solution Apply topically daily as needed (Acne)   More than a month    fluticasone (FLONASE) 50 MCG/ACT nasal spray Spray 1 spray into both nostrils 2 times daily as needed for allergies   Past Week    [DISCONTINUED] ibuprofen (ADVIL/MOTRIN) 200 MG capsule Take 400 mg by mouth every 4 hours as needed for fever   More than a month       DISCHARGE MEDICATIONS:   Current Discharge Medication List        START taking these medications    Details   acetaminophen (TYLENOL) 500 MG tablet Take 1-2 tablets (500-1,000 mg) by mouth every 8 hours as needed for other (For optimal non-opioid multimodal pain management to improve pain control.)  Qty: 120 tablet, Refills: 0    Associated Diagnoses: S/P AVR (aortic valve replacement)      aspirin (ASA) 81 MG chewable tablet Take 1 tablet (81 mg) by mouth daily  Qty: 60 tablet, Refills: 0    Associated Diagnoses: S/P AVR (aortic valve replacement)      methocarbamol (ROBAXIN) 750 MG tablet Take 1 tablet (750 mg) by mouth 4 times daily as needed for muscle spasms or other (acute post-operative pain)  Qty: 60 tablet, Refills: 0    Associated Diagnoses: S/P AVR (aortic valve replacement)      metoprolol tartrate (LOPRESSOR) 25 MG tablet Take 1 tablet (25 mg) by mouth 2 times daily  Qty: 60 tablet, Refills: 0    Associated Diagnoses: S/P AVR (aortic valve replacement)      pantoprazole (PROTONIX) 40 MG EC tablet Take 1 tablet (40 mg) by mouth daily  Qty: 21 tablet, Refills: 0    Associated Diagnoses: S/P AVR (aortic valve replacement)       polyethylene glycol (MIRALAX) 17 GM/Dose powder Take 17 g by mouth 2 times daily as needed for constipation  Qty: 510 g, Refills: 0    Associated Diagnoses: S/P AVR (aortic valve replacement)      warfarin ANTICOAGULANT (COUMADIN) 5 MG tablet Take 1.5 tablets (7.5 mg) at the same time each evening on 6/16 and 6/17. You should have an INR checked on 6/17. Future warfarin dosing should come from your anticoagulation clinic. Always follow the most recent instructions from your anticoagulation clinic. Do not skip this medication.  Qty: 45 tablet, Refills: 0    Associated Diagnoses: S/P AVR (aortic valve replacement)           CONTINUE these medications which have NOT CHANGED    Details   aluminum chloride (DRYSOL) 20 % external solution Apply topically daily as needed (Sweating)      amphetamine-dextroamphetamine (ADDERALL) 30 MG tablet Take 30 mg by mouth 2 times daily      cetirizine (ZYRTEC) 5 MG tablet Take 5 mg by mouth every morning      clindamycin (CLEOCIN T) 1 % external solution Apply topically daily as needed (Acne)      fluticasone (FLONASE) 50 MCG/ACT nasal spray Spray 1 spray into both nostrils 2 times daily as needed for allergies           STOP taking these medications       ibuprofen (ADVIL/MOTRIN) 200 MG capsule Comments:   Reason for Stopping:                CC:Bisi Christianson PA-C  [facility]   Cardiothoracic Surgery  Office phone: [phone]  Office fax: [phone]

## 2024-06-14 NOTE — PROGRESS NOTES
Cardiovascular Surgery Progress Note  06/14/2024         Assessment and Plan:     Adam Garcia is a 38 year old male with a PMH of tobacco abuse, ADHD, severe AR. Patient is now s/p aortic valve replacement with mechanical OnX valve on 6/11/24 with Dr. Nehemiah Kat.    Cardiovascular:   Hx of severe aortic insufficiency s/p aortic valve replacement, mechanical OnX  Post-operative afib, resolved  Developed arrhythmia [id number], then spontaneously resolved into NSR. HD stable throughout. Telemetry had concern for complete heart block with narrow escape rhythm, but on review with EP fellow, most consistent with organizing atrial fibrillation.  Most recent echo demonstrated preserved biventricular function and noted aortic valvular abnormality  6/14 echo: LV EF 50-55%, well seated mechanical AV with trace para-valvular regurgitation, AV mean gradient 8 mmHg, normal RA pressure  ASA 81 mg daily  Statin not indicated  BB: metoprolol tartrate 25 mg BID with hold parameters  Plan to discharge with [facility] for afib monitoring  Diuresis as below  AC as below    Chest tubes: removed 6/13  TPW: removed 6/13    Pulmonary:  Tobacco misuse  Extubated in OR. Now saturating well on RA   Continue aggressive pulm hygiene, IS, activity and deep breathing  PTA zyrtec and flonase resumed  Encourage cessation    Neurology / Psych:  Acute post-operative pain   ADHD  Pain well controlled with current regimen  Continue PTA Adderall      / Renal / Fluid / Electrolytes:  Hypervolemia  BL creat ~ 0.8, most recent creatinine stable; adequate UOP   FLUID STATUS: Pre-op weight 236 lbs, weight today 230 lbs; I/O past 24 hours: -3 L  Diuresis: defer as euvolemic, dose PRN  Replete lytes per protocol  Strict I/O, daily weights  Avoid/limit nephrotoxins as able    GI / Nutrition:   Risk for protein-calorie malnutrition  Tolerating regular diet, supplements  +BM 6/14, continue bowel regimen    Endocrine:  Stress induced hyperglycemia   Hgb A1c  "5.2%  Managed on insulin drip postop, transitioned to sliding scale goal BG <180 and has now also been discontinued due to good BG control with no insulin need.   Goal BG <180 for optimal healing    Infectious Disease:  Stress induced leukocytosis  WBC 6.4, remains afebrile, no signs or symptoms of infection  Completed perioperative antibiotics  Continue to monitor fever curve, CBC    Hematology:   Acute blood loss anemia  Acute blood loss thrombocytopenia  Hgb 12.5; Plt 206K, no signs or symptoms of active bleeding  CBC in AM    Anticoagulation:   Chronic anticoagulation for mechanical AVR, INR goal as below  ASA 81 mg daily  Coumadin for mechanical OnX AVR, INR goal 2-3 the first 3 months then 1.5-2 lifelong thereafter. Most recent INR 1.60. Per surgeon okay to discharge if INR >1.8 and trending up.  LIH bridge started 6/15 as INR is not therapeutic  Requested warfarin education 6/15    MSK/Skin:  Sternotomy  Surgical incision  Sternal precautions  Incisional cares per protocol    Prophylaxis:   Stress ulcer prophylaxis: Pantoprazole 40 mg daily  DVT prophylaxis: Subcutaneous heparin, SCD    Medically Ready for Discharge: Anticipated Tomorrow, pending therapeutic INR                              # Obesity: Estimated body mass index is 35.28 kg/m  as calculated from the following:    Height as of this encounter: 1.727 m (5' 8\").    Weight as of this encounter: 105.2 kg (232 lb)., PRESENT ON ADMISSION            Disposition:   Transferred to  on 6/12  Therapies recommending discharge to home with OP cardiac rehab. Barrier to discharge is therapeutic INR.    Dr. Kat has been informed of changes.    Deb Collins PA-C  Cardiothoracic Surgery  Pager [phone]    7:01 AM Antoinette 15, 2024          Interval History:   Arrhythmia overnight. Patient felt he could tell his heart rate was mildly faster but denies any chest pain, shortness of breath, dizziness, lightheadedness, or pounding heart rate.  Otherwise feels well. " "  Pain is well managed.   Breathing well.   Tolerating diet, is passing flatus, + BM, no n/v. Denies chest pain, palpitations, dizziness, syncopal symptoms, chills, myalgias, sternal popping/clicking.  Asks appropriate questions.          Physical Exam:     Gen: A&Ox4, NAD  Neuro: Intact with no focal deficits   CV: RRR, normal S1 S2, no murmurs, rubs or gallops. no JVD  Pulm: CTA, no wheezing or rhonchi, unlabored breathing on RA  Abd: nondistended, normal BS, soft, nontender  Ext: no peripheral edema, no pitting  Incision: clean, dry, intact, no erythema, sternum stable - beard guard placed to superior aspect of incision  Tubes/drain sites: dressing clean and dry         Data:    /71 (BP Location: Left arm)   Pulse 87   Temp 98.2  F (36.8  C) (Oral)   Resp 18   Ht 1.727 m (5' 8\")   Wt 105.2 kg (232 lb)   SpO2 98%   BMI 35.28 kg/m         Intake/Output Summary (Last 24 hours) at 6/13/2024 0945  Last data filed at 6/13/2024 0800  Gross per 24 hour   Intake 1940 ml   Output 4850 ml   Net -2910 ml        Imaging:  reviewed recent imaging, no acute concerns    Recent Results (from the past 24 hour(s))   XR Chest 2 Views    Narrative    EXAM: XR CHEST 2 VIEWS 6/14/2024 8:56 AM    INDICATION: Chest tube removal    COMPARISON: Chest radiograph 6/12/2024, CT 4/8/2024    TECHNIQUE: Single upright AP view of the chest.    FINDINGS:   Stable postsurgical changes of aortic valve replacement with midline  sternotomy wires intact. Interval removal of right IJ central venous  catheter and mediastinal drains. Stable position of artivion aortic  valve. No evidence of pneumothorax, focal consolidation or pleural  effusion. Stable cardiomegaly. Trachea is midline. No abnormal  calcifications. Upper abdomen within normal limits.      Impression    IMPRESSION:   Stable postsurgical changes of valve replacement with interval removal  of right IJ central venous catheter and mediastinal drains. No  evidence of " pneumothorax.    I have personally reviewed the examination and initial interpretation  and I agree with the findings.    CHINTAN ORTIZ MD         SYSTEM ID:  [id number]   Echocardiogram Limited   Result Value    LVEF  50-55% (borderline)    [facility]    [id number]  [id number]  [id number]  [id number]^SUSAN^FRANKLYN^NELLI     [facility],[place]  Echocardiography Laboratory  [address] [place] [address]     Name: YOAN NAYAK  MRN: [id number]  : 1985  Study Date: 2024 09:28 AM  Age: 38 yrs  Gender: Male  Patient Location: [id number]  Reason For Study: Aortic Valve Replacement  Ordering Physician: FRANKLYN DAVIS  Performed By: Dann Mcclendon RDCS     BSA: 2.2 m2  Height: 68 in  Weight: 232 lb  ______________________________________________________________________________  Procedure  Limited Portable Echo Adult. Contrast Optison. Optison (NDC #5593-5206-82)  given intravenously. Patient was given 6 ml mixture of 3 ml Optison and 6 ml  saline. 3 ml wasted.  ______________________________________________________________________________  Interpretation Summary  Left ventricular function is decreased. The ejection fraction is 50-55%  (borderline).Abnormal non-specific septal motion is present.  Global right ventricular function is normal.  History of bicuspid aortic valve s/p Aortic valve replacement with 27/29 mm  ARTIVION On-X mechanical valve 24. The valve is well seated there is  trace para valvular regurgitation. Doppler interrogation of the valve is  normal with Vm 1.8m/s MG 8mmHg  IVC diameter <2.1 cm collapsing >50% with sniff suggests a normal RA pressure  of 3 mmHg.  No pericardial effusion is present.  This study was compared with the study from 3/28/24 the patient is s/p AVR,  LVEF is slightly lower .  ______________________________________________________________________________  Left Ventricle  Left ventricular size is normal. Left ventricular function is decreased.  The  ejection fraction is 50-55% (borderline). Abnormal non-specific septal motion  is present.     Right Ventricle  The right ventricle is normal size. Global right ventricular function is  normal.     Mitral Valve  The mitral valve is normal.     Aortic Valve  History of bicuspid aortic valve s/p Aortic valve replacement with 27/29 mm  ARTIVION On-X mechanical valve 24. The valve is well seated there is  trace para valvular regurgitation. doppler interrogation of the valve is  normal with Vm 1.8m/s MG 8mmHg.     Tricuspid Valve  The peak velocity of the tricuspid regurgitant jet is not obtainable.     Pulmonic Valve  The valve leaflets are not well visualized.     Vessels  The aorta root is normal. IVC diameter <2.1 cm collapsing >50% with sniff  suggests a normal RA pressure of 3 mmHg.     Pericardium  No pericardial effusion is present.     Compared to Previous Study  This study was compared with the study from 3/28/24 the patient is s/p AVR,  LVEF is lower .  ______________________________________________________________________________  MMode/2D Measurements & Calculations  IVSd: 1.2 cm     LVIDd: 5.3 cm  LVIDs: 4.2 cm  LVPWd: 1.3 cm  FS: 19.8 %  LV mass(C)d: 278.1 grams  LV mass(C)dI: 127.7 grams/m2  asc Aorta Diam: 3.4 cm  LVOT diam: 2.3 cm  LVOT area: 4.1 cm2  Asc Ao diam index BSA (cm/m2): 1.6  Asc Ao diam index Ht(cm/m): 2.0  RWT: 0.50     Doppler Measurements & Calculations  Ao V2 max: 182.7 cm/sec  Ao max P.3 mmHg  Ao V2 mean: 135.5 cm/sec  Ao mean P.0 mmHg  Ao V2 VTI: 27.8 cm  LIDA(I,D): 1.9 cm2  LIDA(V,D): 1.9 cm2  LV V1 max P.9 mmHg  LV V1 max: 85.8 cm/sec  LV V1 VTI: 12.9 cm  SV(LVOT): 53.2 ml  SI(LVOT): 24.5 ml/m2  AV Andre Ratio (DI): 0.47  LIDA Index (cm2/m2): 0.88     ______________________________________________________________________________  Report approved by: Yolanda Couch 2024 10:31 AM              Labs:  Recent Labs   Lab 24  0703  06/13/24  0441 06/12/24  1943 06/12/24  1636 06/12/24  1601 06/12/24  0915 06/12/24  0912 06/12/24  0446 06/12/24  0423 06/11/24  1448 06/11/24  1318   WBC 7.8  --   --   --   --   --   --   --  10.3  --  13.1*   HGB 12.7*  --   --   --   --   --   --   --  13.2*  --  13.2*   MCV 84  --   --   --   --   --   --   --  85  --  84     --   --   --   --   --   --   --  196  --  176   INR 1.37* 1.15  --   --   --   --  1.23*  --   --   --  1.25*    135  --   --  135  --   --   --  135  --  139   POTASSIUM 4.1 3.9  3.9  --   --  3.9  --   --   --  4.2  --  4.4   CHLORIDE 102 100  --   --  99  --   --   --  102  --  107   CO2 24 24  --   --  27  --   --   --  25  --  24   BUN 6.3 6.8  --   --  8.5  --   --   --  6.3  --  10.4   CR 0.65* 0.78  --   --  0.82  --   --   --  0.70  --  0.84   ANIONGAP 10 11  --   --  9  --   --   --  8  --  8   MARKUS 8.9 7.8*  --   --  8.5*  --   --   --  8.5*  --  8.1*   * 120* 136*   < > 144*   < >  --    < > 159*   < > 137*   ALBUMIN  --   --   --   --   --   --   --   --  3.8  --  3.8   PROTTOTAL  --   --   --   --   --   --   --   --  6.0*  --  5.6*   BILITOTAL  --   --   --   --   --   --   --   --  0.7  --  0.7   ALKPHOS  --   --   --   --   --   --   --   --  52  --  57   ALT  --   --   --   --   --   --   --   --  12  --  12   AST  --   --   --   --   --   --   --   --  28  --  27    < > = values in this interval not displayed.      GLUCOSE:   Recent Labs   Lab 06/14/24  0703 06/13/24  0441 06/12/24  1943 06/12/24  1636 06/12/24  1601 06/12/24  0915   * 120* 136* 137* 144* 123*

## 2024-06-14 NOTE — PROGRESS NOTES
Cardiovascular Surgery Progress Note  06/14/2024         Assessment and Plan:     Adam Garcia is a 38 year old male with a PMH of tobacco abuse, ADHD, severe AR. Patient is now s/p aortic valve replacement with mechanical OnX valve on 6/11/24 with Dr. Nehemiah Kat.    Cardiovascular:   Hx of severe aortic insufficiency s/p aortic valve replacement, mechanical OnX  HD stable, in NSR/ST.  Most recent echo demonstrated preserved biventricular function and noted aortic valvular abnormality  ASA 81 mg daily  Statin not indicated  BB: metoprolol tartrate 25 mg BID with hold parameters  Diuresis as below  AC as below  Postop baseline echo today    Chest tubes: removed 6/13  TPW: removed 6/13    Pulmonary:  Tobacco abuse  Extubated in OR. Now saturating well on RA   Continue aggressive pulm hygiene, IS, activity and deep breathing  PTA zyrtec and flonase resumed  Encourage cessation    Neurology / MSK:  Acute post-operative pain   ADHD  Pain well controlled with current regimen  Resumed PTA Adderall      / Renal / Fluid / Electrolytes:  Hypervolemia  BL creat ~ 0.8, most recent creatinine stable; adequate UOP.   FLUID STATUS: Pre-op weight 236 lbs, weight today 232 lbs; I/O past 24 hours: -1.7 L  Diuresis: defer as euvolemic, dose PRN  Replete lytes per protocol  Strict I/O, daily weights  Avoid/limit nephrotoxins as able    GI / Nutrition:   Risk for protein calorie malnutrition  Tolerating regular diet, supplements  Continue bowel regimen, no BM yet, +flatus, PRN dulcolax supp available    Endocrine:  Stress induced hyperglycemia   Hgb A1c 5.2%  Initially managed on insulin drip postop, transitioned to sliding scale  Goal BG <180 for optimal healing    Infectious Disease:  Stress induced leukocytosis  WBC 7.8, remains afebrile, no signs or symptoms of infection  Completed perioperative antibiotics  Continue to monitor fever curve, CBC    Hematology:   Acute blood loss anemia  Acute blood loss  "thrombocytopenia  Hgb 12.7; Plt 168K, no signs or symptoms of active bleeding  CBC in AM    Anticoagulation:   ASA 81 mg daily  Coumadin for mechanical OnX AVR, INR goal 2-3 the first 3 months then 1.5-2 lifelong thereafter. Most recent INR 1.37.    Start LIH bridge tomorrow 6/15 if not therapeutic    MSK/Skin:  Sternotomy  Surgical incision  Sternal precautions  Incisional cares per protocol    Prophylaxis:   Stress ulcer prophylaxis: Pantoprazole 40 mg daily  DVT prophylaxis: Subcutaneous heparin, SCD    Medically Ready for Discharge: Anticipated in 2-4 Days, pending therapeutic INR                              # Obesity: Estimated body mass index is 35.28 kg/m  as calculated from the following:    Height as of this encounter: 1.727 m (5' 8\").    Weight as of this encounter: 105.2 kg (232 lb)., PRESENT ON ADMISSION              Disposition:   Transferred to  on 6/12  Therapies recommending discharge to home with OP CR    Dr. Kat has been informed of changes through both verbal and written communication.      CATHERINE Heard, ACNPC-AG, CCRN  Nurse Practitioner  Cardiothoracic Surgery  Pager: [phone]        Interval History:     No acute events overnight. States pain is well managed on current regimen, slept well. Breathing is stable on room air, working with IS. Tolerating diet, is passing flatus, no BM, no n/v. Denies chest pain, palpitations, dizziness, syncopal symptoms, chills, myalgias, sternal popping/clicking.         Physical Exam:     Gen: A&Ox4, NAD  Neuro: Intact with no focal deficits   CV: RRR, normal S1 S2, no murmurs, rubs or gallops. no JVD  Pulm: CTA, no wheezing or rhonchi, normal breathing on RA  Abd: nondistended, normal BS, soft, nontender  Ext: trace peripheral edema, no pitting  Incision: clean, dry, intact, no erythema, sternum stable  Tubes/drain sites: dressing clean and dry         Data:    /85 (Cuff Size: Adult Regular)   Pulse 98   Temp 98.4  F (36.9  C) (Oral)   " "Resp 18   Ht 1.727 m (5' 8\")   Wt 105.2 kg (232 lb)   SpO2 96%   BMI 35.28 kg/m         Intake/Output Summary (Last 24 hours) at 6/13/2024 0945  Last data filed at 6/13/2024 0800  Gross per 24 hour   Intake 1940 ml   Output 4850 ml   Net -2910 ml        Imaging:  reviewed recent imaging, no acute concerns    No results found for this or any previous visit (from the past 24 hour(s)).     Labs:  Recent Labs   Lab 06/14/24  0703 06/13/24  0441 06/12/24  1943 06/12/24  1636 06/12/24  1601 06/12/24  0915 06/12/24  0912 06/12/24  0446 06/12/24  0423 06/11/24  1448 06/11/24  1318   WBC 7.8  --   --   --   --   --   --   --  10.3  --  13.1*   HGB 12.7*  --   --   --   --   --   --   --  13.2*  --  13.2*   MCV 84  --   --   --   --   --   --   --  85  --  84     --   --   --   --   --   --   --  196  --  176   INR 1.37* 1.15  --   --   --   --  1.23*  --   --   --  1.25*    135  --   --  135  --   --   --  135  --  139   POTASSIUM 4.1 3.9  3.9  --   --  3.9  --   --   --  4.2  --  4.4   CHLORIDE 102 100  --   --  99  --   --   --  102  --  107   CO2 24 24  --   --  27  --   --   --  25  --  24   BUN 6.3 6.8  --   --  8.5  --   --   --  6.3  --  10.4   CR 0.65* 0.78  --   --  0.82  --   --   --  0.70  --  0.84   ANIONGAP 10 11  --   --  9  --   --   --  8  --  8   MARKUS 8.9 7.8*  --   --  8.5*  --   --   --  8.5*  --  8.1*   * 120* 136*   < > 144*   < >  --    < > 159*   < > 137*   ALBUMIN  --   --   --   --   --   --   --   --  3.8  --  3.8   PROTTOTAL  --   --   --   --   --   --   --   --  6.0*  --  5.6*   BILITOTAL  --   --   --   --   --   --   --   --  0.7  --  0.7   ALKPHOS  --   --   --   --   --   --   --   --  52  --  57   ALT  --   --   --   --   --   --   --   --  12  --  12   AST  --   --   --   --   --   --   --   --  28  --  27    < > = values in this interval not displayed.      GLUCOSE:   Recent Labs   Lab 06/14/24  0703 06/13/24  0441 06/12/24  1943 06/12/24  1636 06/12/24  1601 " 06/12/24  0915   [facility] 109* 120* 136* 137* 144* 123*

## 2024-06-14 NOTE — PLAN OF CARE
A:   Neuro: A/Ox4. Calls appropriately. Pleasant and cooperative.   Cardiac/Tele:  VSS. SR, HR 80s-90s. Denies chest pain.  Respiratory: Room air. Tolerating well, no reported SOB.  GI/: Continent. Up to bathroom ad jeovanny, BM x1 this shift. Voiding adequately.  Diet/Appetite: Regular diet, good appetite.  Skin: CT dressing removed, sites WDL.  LDAs: L PIV SL  Activity: Up ad jeovanny, independent. Walked in halls with nurse and PT, SBA.  Pain: Reported pain 2/10 at incision site, controlled with scheduled Tylenol and Robaxin.     P: D/C to home once INR therapeutic.  Continue to monitor and notify CVTS with changes.    Goal Outcome Evaluation:      Plan of Care Reviewed With: patient    Overall Patient Progress: improvingOverall Patient Progress: improving    Outcome Evaluation: Pt denies chest pain/SOB, went walking in the halls this shift & tolerated well, incision WDL    Manju ROBBINS RN  Shift [id number]

## 2024-06-14 NOTE — PLAN OF CARE
Hours of care 2300 - 0730    Neuro: A&Ox4. Pleasant.   Cardiac: VSS. SR, HR 90s. Normotensive.   Respiratory: RA, spot check >92%.   GI/: Voids spontaneously. No BM since 6/10.   Diet: Reg.   Skin: See adult PCS, no new deficits noted.   LDAs: L PIV.   Activity: Up ad jeovanny in room.  Pain: Endorses mild pain at incision sites. Relief with scheduled tylenol and robaxin.      A: VSS. Afebrile. Urinating adequately.      P: Will continue POC and report changes to treatment team.

## 2024-06-15 ENCOUNTER — APPOINTMENT (OUTPATIENT)
Dept: OCCUPATIONAL THERAPY | Facility: CLINIC | Age: 39
End: 2024-06-15
Attending: SURGERY
Payer: COMMERCIAL

## 2024-06-15 LAB
ANION GAP SERPL CALCULATED.3IONS-SCNC: 9 MMOL/L (ref 7–15)
BUN SERPL-MCNC: 8.8 MG/DL (ref 6–20)
CALCIUM SERPL-MCNC: 9.1 MG/DL (ref 8.6–10)
CHLORIDE SERPL-SCNC: 103 MMOL/L (ref 98–107)
CREAT SERPL-MCNC: 0.69 MG/DL (ref 0.67–1.17)
DEPRECATED HCO3 PLAS-SCNC: 26 MMOL/L (ref 22–29)
EGFRCR SERPLBLD CKD-EPI 2021: >90 ML/MIN/1.73M2
ERYTHROCYTE [DISTWIDTH] IN BLOOD BY AUTOMATED COUNT: 13.2 % (ref 10–15)
ERYTHROCYTE [DISTWIDTH] IN BLOOD BY AUTOMATED COUNT: 13.3 % (ref 10–15)
GLUCOSE SERPL-MCNC: 102 MG/DL (ref 70–99)
HCT VFR BLD AUTO: 34.7 % (ref 40–53)
HCT VFR BLD AUTO: 35.1 % (ref 40–53)
HGB BLD-MCNC: 12.2 G/DL (ref 13.3–17.7)
HGB BLD-MCNC: 12.5 G/DL (ref 13.3–17.7)
INR PPP: 1.6 (ref 0.85–1.15)
MCH RBC QN AUTO: 30 PG (ref 26.5–33)
MCH RBC QN AUTO: 30.9 PG (ref 26.5–33)
MCHC RBC AUTO-ENTMCNC: 34.8 G/DL (ref 31.5–36.5)
MCHC RBC AUTO-ENTMCNC: 36 G/DL (ref 31.5–36.5)
MCV RBC AUTO: 86 FL (ref 78–100)
MCV RBC AUTO: 86 FL (ref 78–100)
PHOSPHATE SERPL-MCNC: 3.7 MG/DL (ref 2.5–4.5)
PLATELET # BLD AUTO: 206 10E3/UL (ref 150–450)
PLATELET # BLD AUTO: 206 10E3/UL (ref 150–450)
POTASSIUM SERPL-SCNC: 4 MMOL/L (ref 3.4–5.3)
RBC # BLD AUTO: 4.05 10E6/UL (ref 4.4–5.9)
RBC # BLD AUTO: 4.07 10E6/UL (ref 4.4–5.9)
SODIUM SERPL-SCNC: 138 MMOL/L (ref 135–145)
UFH PPP CHRO-ACNC: 0.17 IU/ML
UFH PPP CHRO-ACNC: 0.22 IU/ML
WBC # BLD AUTO: 6.4 10E3/UL (ref 4–11)
WBC # BLD AUTO: 7 10E3/UL (ref 4–11)

## 2024-06-15 PROCEDURE — 120N000003 HC R&B IMCU UMMC

## 2024-06-15 PROCEDURE — 250N000013 HC RX MED GY IP 250 OP 250 PS 637

## 2024-06-15 PROCEDURE — 97530 THERAPEUTIC ACTIVITIES: CPT | Mod: GO

## 2024-06-15 PROCEDURE — 36415 COLL VENOUS BLD VENIPUNCTURE: CPT | Performed by: NURSE PRACTITIONER

## 2024-06-15 PROCEDURE — 250N000013 HC RX MED GY IP 250 OP 250 PS 637: Performed by: STUDENT IN AN ORGANIZED HEALTH CARE EDUCATION/TRAINING PROGRAM

## 2024-06-15 PROCEDURE — 84100 ASSAY OF PHOSPHORUS: CPT | Performed by: SURGERY

## 2024-06-15 PROCEDURE — 85014 HEMATOCRIT: CPT | Performed by: NURSE PRACTITIONER

## 2024-06-15 PROCEDURE — 97110 THERAPEUTIC EXERCISES: CPT | Mod: GO

## 2024-06-15 PROCEDURE — 36415 COLL VENOUS BLD VENIPUNCTURE: CPT

## 2024-06-15 PROCEDURE — 250N000011 HC RX IP 250 OP 636: Mod: JZ

## 2024-06-15 PROCEDURE — 80048 BASIC METABOLIC PNL TOTAL CA: CPT | Performed by: NURSE PRACTITIONER

## 2024-06-15 PROCEDURE — 36415 COLL VENOUS BLD VENIPUNCTURE: CPT | Performed by: SURGERY

## 2024-06-15 PROCEDURE — 85520 HEPARIN ASSAY: CPT

## 2024-06-15 PROCEDURE — 85027 COMPLETE CBC AUTOMATED: CPT

## 2024-06-15 PROCEDURE — 250N000013 HC RX MED GY IP 250 OP 250 PS 637: Performed by: NURSE PRACTITIONER

## 2024-06-15 PROCEDURE — 250N000011 HC RX IP 250 OP 636: Performed by: PHYSICIAN ASSISTANT

## 2024-06-15 PROCEDURE — 250N000013 HC RX MED GY IP 250 OP 250 PS 637: Performed by: PHYSICIAN ASSISTANT

## 2024-06-15 PROCEDURE — 93010 ELECTROCARDIOGRAM REPORT: CPT | Performed by: INTERNAL MEDICINE

## 2024-06-15 PROCEDURE — 85610 PROTHROMBIN TIME: CPT | Performed by: STUDENT IN AN ORGANIZED HEALTH CARE EDUCATION/TRAINING PROGRAM

## 2024-06-15 PROCEDURE — 93005 ELECTROCARDIOGRAM TRACING: CPT

## 2024-06-15 PROCEDURE — 250N000013 HC RX MED GY IP 250 OP 250 PS 637: Performed by: SURGERY

## 2024-06-15 PROCEDURE — 85520 HEPARIN ASSAY: CPT | Performed by: SURGERY

## 2024-06-15 RX ORDER — HEPARIN SODIUM 10000 [USP'U]/100ML
0-5000 INJECTION, SOLUTION INTRAVENOUS CONTINUOUS
Status: DISCONTINUED | OUTPATIENT
Start: 2024-06-15 | End: 2024-06-16

## 2024-06-15 RX ORDER — POLYETHYLENE GLYCOL 3350 17 G/17G
17 POWDER, FOR SOLUTION ORAL DAILY PRN
Status: DISCONTINUED | OUTPATIENT
Start: 2024-06-15 | End: 2024-06-16 | Stop reason: HOSPADM

## 2024-06-15 RX ORDER — POLYETHYLENE GLYCOL 3350 17 G/17G
17 POWDER, FOR SOLUTION ORAL DAILY
Status: DISCONTINUED | OUTPATIENT
Start: 2024-06-15 | End: 2024-06-15

## 2024-06-15 RX ORDER — AMOXICILLIN 250 MG
2 CAPSULE ORAL 2 TIMES DAILY PRN
Status: DISCONTINUED | OUTPATIENT
Start: 2024-06-15 | End: 2024-06-16 | Stop reason: HOSPADM

## 2024-06-15 RX ADMIN — METHOCARBAMOL TABLETS 750 MG: 750 TABLET, COATED ORAL at 17:39

## 2024-06-15 RX ADMIN — METHOCARBAMOL TABLETS 750 MG: 750 TABLET, COATED ORAL at 05:46

## 2024-06-15 RX ADMIN — DEXTROAMPHETAMINE SACCHARATE, AMPHETAMINE ASPARTATE, DEXTROAMPHETAMINE SULFATE AND AMPHETAMINE SULFATE 30 MG: 2.5; 2.5; 2.5; 2.5 TABLET ORAL at 10:13

## 2024-06-15 RX ADMIN — CETIRIZINE HYDROCHLORIDE 5 MG: 5 TABLET ORAL at 08:14

## 2024-06-15 RX ADMIN — WARFARIN SODIUM 7 MG: 4 TABLET ORAL at 17:39

## 2024-06-15 RX ADMIN — POLYETHYLENE GLYCOL 3350 17 G: 17 POWDER, FOR SOLUTION ORAL at 08:14

## 2024-06-15 RX ADMIN — ACETAMINOPHEN 650 MG: 325 TABLET, FILM COATED ORAL at 15:50

## 2024-06-15 RX ADMIN — METOPROLOL TARTRATE 25 MG: 25 TABLET, FILM COATED ORAL at 19:40

## 2024-06-15 RX ADMIN — HEPARIN SODIUM 1200 UNITS/HR: 10000 INJECTION, SOLUTION INTRAVENOUS at 08:23

## 2024-06-15 RX ADMIN — ACETAMINOPHEN 650 MG: 325 TABLET, FILM COATED ORAL at 04:25

## 2024-06-15 RX ADMIN — DEXTROAMPHETAMINE SACCHARATE, AMPHETAMINE ASPARTATE, DEXTROAMPHETAMINE SULFATE AND AMPHETAMINE SULFATE 30 MG: 2.5; 2.5; 2.5; 2.5 TABLET ORAL at 14:10

## 2024-06-15 RX ADMIN — METHOCARBAMOL TABLETS 750 MG: 750 TABLET, COATED ORAL at 23:15

## 2024-06-15 RX ADMIN — ACETAMINOPHEN 650 MG: 325 TABLET, FILM COATED ORAL at 21:40

## 2024-06-15 RX ADMIN — METHOCARBAMOL TABLETS 750 MG: 750 TABLET, COATED ORAL at 12:13

## 2024-06-15 RX ADMIN — METOPROLOL TARTRATE 25 MG: 25 TABLET, FILM COATED ORAL at 09:11

## 2024-06-15 RX ADMIN — PANTOPRAZOLE SODIUM 40 MG: 40 TABLET, DELAYED RELEASE ORAL at 08:14

## 2024-06-15 RX ADMIN — ACETAMINOPHEN 650 MG: 325 TABLET, FILM COATED ORAL at 10:13

## 2024-06-15 RX ADMIN — DOCUSATE SODIUM 50 MG AND SENNOSIDES 8.6 MG 2 TABLET: 8.6; 5 TABLET, FILM COATED ORAL at 08:14

## 2024-06-15 RX ADMIN — HEPARIN SODIUM 5000 UNITS: 5000 INJECTION, SOLUTION INTRAVENOUS; SUBCUTANEOUS at 04:25

## 2024-06-15 RX ADMIN — ASPIRIN 81 MG CHEWABLE TABLET 81 MG: 81 TABLET CHEWABLE at 08:14

## 2024-06-15 RX ADMIN — HEPARIN SODIUM 1350 UNITS/HR: 10000 INJECTION, SOLUTION INTRAVENOUS at 14:12

## 2024-06-15 ASSESSMENT — ACTIVITIES OF DAILY LIVING (ADL)
ADLS_ACUITY_SCORE: 22
ADLS_ACUITY_SCORE: 26
ADLS_ACUITY_SCORE: 22
ADLS_ACUITY_SCORE: 23
ADLS_ACUITY_SCORE: 22
ADLS_ACUITY_SCORE: 26
ADLS_ACUITY_SCORE: 26
ADLS_ACUITY_SCORE: 22

## 2024-06-15 NOTE — PLAN OF CARE
A:   Neuro: A/Ox4. Calls appropriately. Pleasant and cooperative.   Cardiac/Tele:  VSS. SR-ST 80s-100s. Denies chest pain.  Respiratory: Room air. Tolerating well. No SOB reported.  GI/: Continent. BM x2 this shift per pt report. Voiding adequately.  Diet/Appetite: Regular diet, good appetite.   Skin: No new deficits noted.   LDAs: LPIV  Activity: Up ad jeovanny, independent.  Pain: Incisional pain rated 2-3/10 this shift; managed with scheduled Tylenol & Robaxin.    Drips: Heparin gtt 1350 units/hr, 10a recheck @ 2000.     P: Discharge to home once INR therapeutic. Continue to monitor and notify team with changes.      Goal Outcome Evaluation:      Plan of Care Reviewed With: patient    Overall Patient Progress: improvingOverall Patient Progress: improving    Outcome Evaluation: VSS, SR-ST 80s-100s. Heparin gtt started today, awaiting INR to be in goal range of 2-3    Manju ROBBINS RN  Shift [id number]

## 2024-06-15 NOTE — PLAN OF CARE
Shift [phone]    Provider notified, MD Richa, @ 0105: pt with new rhythm -110  EKG done at 0103; uploaded. Afib RVR.   MD responded - Pt denies symptoms/ vitals stable, if rates increase page.     @0200 FYI Pt back in SR HR 90s. Monitor tech stating pt was in third degree block from 0235-8143 not afib rvr. Will continue to monitor for changes.     Temp: 98.7  F (37.1  C) BP: 111/72 Pulse: 96 Resp: 16 SpO2: 97 % O2 Device: None (Room air)    D: s/p AVR 6/11      I/A: Pt is AxOx4. VSS on RA. PIV in place saline locked. Pt rating incisional pain 0-3 on pain scale. Area of musculoskeletal pain left of incision tender to touch, no swelling at site. Scheduled tylenol and robaxin given. Pt up independently without issues. Standing weight 230 lbs this AM.      P: Continue to monitor and follow POC. Notify CVTS with changes      Goal Outcome Evaluation:      Plan of Care Reviewed With: patient  Overall Patient Progress: improving  Outcome Evaluation: VSS on RA; incision WDL, pain tolerable with scheduled tylenol and robaxin. Awaiting INR to be theraputic level

## 2024-06-16 VITALS
DIASTOLIC BLOOD PRESSURE: 89 MMHG | OXYGEN SATURATION: 99 % | WEIGHT: 229.1 LBS | BODY MASS INDEX: 34.72 KG/M2 | TEMPERATURE: 98 F | HEART RATE: 92 BPM | HEIGHT: 68 IN | SYSTOLIC BLOOD PRESSURE: 131 MMHG | RESPIRATION RATE: 20 BRPM

## 2024-06-16 LAB
ANION GAP SERPL CALCULATED.3IONS-SCNC: 9 MMOL/L (ref 7–15)
BUN SERPL-MCNC: 8.1 MG/DL (ref 6–20)
CALCIUM SERPL-MCNC: 8.9 MG/DL (ref 8.6–10)
CHLORIDE SERPL-SCNC: 102 MMOL/L (ref 98–107)
CREAT SERPL-MCNC: 0.69 MG/DL (ref 0.67–1.17)
DEPRECATED HCO3 PLAS-SCNC: 23 MMOL/L (ref 22–29)
EGFRCR SERPLBLD CKD-EPI 2021: >90 ML/MIN/1.73M2
ERYTHROCYTE [DISTWIDTH] IN BLOOD BY AUTOMATED COUNT: 13.4 % (ref 10–15)
GLUCOSE SERPL-MCNC: 97 MG/DL (ref 70–99)
HCT VFR BLD AUTO: 32.3 % (ref 40–53)
HGB BLD-MCNC: 11.6 G/DL (ref 13.3–17.7)
INR PPP: 1.88 (ref 0.85–1.15)
MCH RBC QN AUTO: 30.5 PG (ref 26.5–33)
MCHC RBC AUTO-ENTMCNC: 35.9 G/DL (ref 31.5–36.5)
MCV RBC AUTO: 85 FL (ref 78–100)
PHOSPHATE SERPL-MCNC: 3.4 MG/DL (ref 2.5–4.5)
PLATELET # BLD AUTO: 203 10E3/UL (ref 150–450)
POTASSIUM SERPL-SCNC: 4 MMOL/L (ref 3.4–5.3)
POTASSIUM SERPL-SCNC: 4.4 MMOL/L (ref 3.4–5.3)
RBC # BLD AUTO: 3.8 10E6/UL (ref 4.4–5.9)
SODIUM SERPL-SCNC: 134 MMOL/L (ref 135–145)
UFH PPP CHRO-ACNC: 0.31 IU/ML
UFH PPP CHRO-ACNC: 0.33 IU/ML
WBC # BLD AUTO: 5.3 10E3/UL (ref 4–11)

## 2024-06-16 PROCEDURE — 84132 ASSAY OF SERUM POTASSIUM: CPT

## 2024-06-16 PROCEDURE — 84100 ASSAY OF PHOSPHORUS: CPT | Performed by: SURGERY

## 2024-06-16 PROCEDURE — 250N000013 HC RX MED GY IP 250 OP 250 PS 637

## 2024-06-16 PROCEDURE — 85027 COMPLETE CBC AUTOMATED: CPT

## 2024-06-16 PROCEDURE — 250N000011 HC RX IP 250 OP 636: Mod: JZ

## 2024-06-16 PROCEDURE — 36415 COLL VENOUS BLD VENIPUNCTURE: CPT | Performed by: SURGERY

## 2024-06-16 PROCEDURE — 250N000013 HC RX MED GY IP 250 OP 250 PS 637: Performed by: PHYSICIAN ASSISTANT

## 2024-06-16 PROCEDURE — 85520 HEPARIN ASSAY: CPT | Performed by: SURGERY

## 2024-06-16 PROCEDURE — 85610 PROTHROMBIN TIME: CPT | Performed by: STUDENT IN AN ORGANIZED HEALTH CARE EDUCATION/TRAINING PROGRAM

## 2024-06-16 PROCEDURE — 80048 BASIC METABOLIC PNL TOTAL CA: CPT | Performed by: SURGERY

## 2024-06-16 PROCEDURE — 250N000013 HC RX MED GY IP 250 OP 250 PS 637: Performed by: NURSE PRACTITIONER

## 2024-06-16 PROCEDURE — 250N000013 HC RX MED GY IP 250 OP 250 PS 637: Performed by: STUDENT IN AN ORGANIZED HEALTH CARE EDUCATION/TRAINING PROGRAM

## 2024-06-16 PROCEDURE — 36415 COLL VENOUS BLD VENIPUNCTURE: CPT

## 2024-06-16 RX ORDER — ASPIRIN 81 MG/1
81 TABLET, CHEWABLE ORAL DAILY
Qty: 60 TABLET | Refills: 0 | Status: SHIPPED | OUTPATIENT
Start: 2024-06-17 | End: 2024-08-15

## 2024-06-16 RX ORDER — PANTOPRAZOLE SODIUM 40 MG/1
40 TABLET, DELAYED RELEASE ORAL DAILY
Qty: 21 TABLET | Refills: 0 | Status: SHIPPED | OUTPATIENT
Start: 2024-06-17 | End: 2024-08-15

## 2024-06-16 RX ORDER — METHOCARBAMOL 750 MG/1
750 TABLET, FILM COATED ORAL 4 TIMES DAILY PRN
Qty: 60 TABLET | Refills: 0 | Status: SHIPPED | OUTPATIENT
Start: 2024-06-16 | End: 2024-08-15

## 2024-06-16 RX ORDER — POLYETHYLENE GLYCOL 3350 17 G/17G
17 POWDER, FOR SOLUTION ORAL 2 TIMES DAILY PRN
Qty: 510 G | Refills: 0 | Status: SHIPPED | OUTPATIENT
Start: 2024-06-16 | End: 2024-08-15

## 2024-06-16 RX ORDER — WARFARIN SODIUM 5 MG/1
TABLET ORAL
Qty: 45 TABLET | Refills: 0 | Status: ACTIVE | OUTPATIENT
Start: 2024-06-16

## 2024-06-16 RX ORDER — ACETAMINOPHEN 500 MG
500-1000 TABLET ORAL EVERY 8 HOURS PRN
Qty: 120 TABLET | Refills: 0 | Status: SHIPPED | OUTPATIENT
Start: 2024-06-16

## 2024-06-16 RX ORDER — WARFARIN SODIUM 7.5 MG/1
7.5 TABLET ORAL
Status: DISCONTINUED | OUTPATIENT
Start: 2024-06-16 | End: 2024-06-16 | Stop reason: HOSPADM

## 2024-06-16 RX ORDER — METOPROLOL TARTRATE 25 MG/1
25 TABLET, FILM COATED ORAL 2 TIMES DAILY
Qty: 60 TABLET | Refills: 0 | Status: SHIPPED | OUTPATIENT
Start: 2024-06-16 | End: 2024-08-15

## 2024-06-16 RX ADMIN — HEPARIN SODIUM 1500 UNITS/HR: 10000 INJECTION, SOLUTION INTRAVENOUS at 00:27

## 2024-06-16 RX ADMIN — DEXTROAMPHETAMINE SACCHARATE, AMPHETAMINE ASPARTATE, DEXTROAMPHETAMINE SULFATE AND AMPHETAMINE SULFATE 30 MG: 2.5; 2.5; 2.5; 2.5 TABLET ORAL at 10:10

## 2024-06-16 RX ADMIN — PANTOPRAZOLE SODIUM 40 MG: 40 TABLET, DELAYED RELEASE ORAL at 07:57

## 2024-06-16 RX ADMIN — ASPIRIN 81 MG CHEWABLE TABLET 81 MG: 81 TABLET CHEWABLE at 07:57

## 2024-06-16 RX ADMIN — ACETAMINOPHEN 650 MG: 325 TABLET, FILM COATED ORAL at 10:10

## 2024-06-16 RX ADMIN — ACETAMINOPHEN 650 MG: 325 TABLET, FILM COATED ORAL at 04:25

## 2024-06-16 RX ADMIN — CETIRIZINE HYDROCHLORIDE 5 MG: 5 TABLET ORAL at 07:57

## 2024-06-16 RX ADMIN — METOPROLOL TARTRATE 25 MG: 25 TABLET, FILM COATED ORAL at 07:57

## 2024-06-16 RX ADMIN — METHOCARBAMOL TABLETS 750 MG: 750 TABLET, COATED ORAL at 12:13

## 2024-06-16 ASSESSMENT — ACTIVITIES OF DAILY LIVING (ADL)
ADLS_ACUITY_SCORE: 22

## 2024-06-16 NOTE — PROGRESS NOTES
D-S/p mechanical AVR on 06/11/24 for severe AR. A&O X 4. Up to the BR independently. INR 1.60.See flow sheets for vs and assessments.  I-Heparin infusing at 1350u/hr. 7 mg coumadin per order. Scheduled tylenol and methocarbamol.  Showered this evening with minimal assistance. Dressings changed after shower.  A-Adequate pain control per pt's report. Telemetry shows SR/ST.  P-Continue with current poc. Notify CVTS provider of any concerns/changes.

## 2024-06-16 NOTE — PROGRESS NOTES
Care Management Initial Consult    General Information  Assessment completed with:  RNCC    Primary Care Provider verified and updated as needed:  Yes    Communication Assessment  Patient's communication style: spoken language is English   Hearing Difficulty or Deaf: no   Wear Glasses or Blind: yes    Cognitive  Cognitive/Neuro/Behavioral: WDL  Level of Consciousness: alert  Arousal Level: opens eyes spontaneously  Orientation: oriented x 4  Mood/Behavior: calm, cooperative  Best Language: 0 - No aphasia  Speech: clear, spontaneous, logical    Living Environment:   People in home: spouse     Current living Arrangements:     Home   Able to return to prior arrangements:  Yes     Family/Social Support:  Care provided by:  Spouse and family/friends             Description of Support System:    Good     Current Resources:   Patient receiving home care services:  No     Community Resources:    Equipment currently used at home: shower chair, powered lift/recliner, walker, cane, grabber, seatbelt pad.    Employment/Financial:  Employment Status:     Employed as a Corporate      Financial Concerns:   No    Does the patient's insurance plan have a 3 day qualifying hospital stay waiver?  No    Values/Beliefs:  Spiritual, Cultural Beliefs, Rastafarian Practices, Values that affect care:        No          Care Management Discharge Note    Discharge Date: 06/16/2024    Discharge Transportation: family or friend will provide    Private pay costs discussed: Not applicable    Does the patient's insurance plan have a 3 day qualifying hospital stay waiver?  No    PAS Confirmation Code:    Patient/family educated on Medicare website which has current facility and service quality ratings:      Education Provided on the Discharge Plan:    Persons Notified of Discharge Plans: Patient/Family  Patient/Family in Agreement with the Plan:  Yes    Handoff Referral Completed: Yes    Additional Information:    This writer met  with patient at bedside to discuss discharge plans and provide assistance per team request. NA Daley with [facility] requested outpatient INR/Warfarin lab draw be re-scheduled from Wednesday to Tuesday. [facility] was able to accommodate the change. Lab draw scheduled for Tuesday, June 18th, 2024 at 13:00.     Cardiac Care Coordinator will continue to follow outpatient per patient/spouse communication. Patient and family verbalized an understanding of discharge plan and lab appointment.Patient discharged to home with wife. External hand off completed with AVS and cover sheet faxed to primary care provider on file, Bisi Mccollum MD.       Violeta Ortiz, RN, BSN, PHN  RN Care Coordinator (RNCC) Casual    Nurse Coordinator    Social Work and Care Management Department      SEARCHABLE in [facility] - search CARE COORDINATOR        [place] [facility] [place] ([phone]) Saturday & Sunday; ([phone]) [facility] Recognized Holidays     Units: 5A Onc Vocera & 5C Vocera Pager: [phone]    Units: 6B Vocera & 6C Vocera  Pager: [phone]    Units: 7A SOT RNCC Vocera, 7B Med Surg Vocera, & 7C Med Surg Vocera  Pager: [phone]    Units: 6A Vocera & 4A CVICU Vocera, 4C MICU Vocera, and 4E SICU Vocera   Pager: [phone]    Units: 5 Ortho Vocera & 5 Med Surg Vocera  Pager: [phone]    Units: 6 Med Surg Vocera & 8 Med Surg Vocera  Pager [phone]

## 2024-06-16 NOTE — PHARMACY-ANTICOAGULATION SERVICE
Clinical Pharmacy- Warfarin Discharge Note  This patient is currently on warfarin for the treatment of  mechanical aortic valve replacement with OnX Valve .  INR Goal=  2-3 for at least 3 months, then at that time can consider decrease INR goal to 1.5-2 if no other indications for anticoagulation exist at that time.  Expected length of therapy lifetime.    Anticoagulation Dose History          Latest Ref Rng & Units 6/11/2024 6/12/2024 6/13/2024 6/14/2024 6/15/2024 6/16/2024   Recent Dosing and Labs   warfarin ANTICOAGULANT (COUMADIN) 1 MG tablet - - - - - 7 mg, $Given -   warfarin ANTICOAGULANT (COUMADIN) 3 MG tablet - - - - 6 mg, $Given - -   warfarin ANTICOAGULANT (COUMADIN) 5 MG tablet - - 5 mg, $Given 5 mg, $Given - - -   INR 0.85 - 1.15 1.25  1.38  1.23  1.15  1.37  1.60  1.88      Vitamin K doses administered during the last 7 days: none    A/P:   Recommend discharging the patient on a warfarin regimen of 7.5 mg daily with a prescription for warfarin 5mg tablets for easier titration given he is not on a stable dose.  Service okay with INR drifting up without additional bridging given it is trending to therapeutic.   Recommend INR check on Tuesday, June 18th.     Kortney Elias, PharmD, BCCP, BCPS

## 2024-06-16 NOTE — PLAN OF CARE
Occupational Therapy Discharge Summary    Reason for therapy discharge:    Discharged to home with outpatient therapy.    Progress towards therapy goal(s). See goals on Care Plan in [facility] electronic health record for goal details.  Goals partially met.  Barriers to achieving goals:   discharge from facility.    Therapy recommendation(s):    Continued therapy is recommended.  Rationale/Recommendations:  Recommend OP CR to progress cardiopulmonary health.

## 2024-06-16 NOTE — PROGRESS NOTES
DISCHARGE                         06/16/2024  ----------------------------------------------------------------------------  Discharged to: Home  Via: Automobile  Accompanied by: Spouse  Discharge Instructions: diet, activity, medications, follow up appointments, when to call the MD, aftercare instructions, and what to watchout for (i.e. s/s of infection, increasing SOB, palpitations, chest pain,)  Prescriptions: Picked up at [facility] discharge pharmacy; medication list reviewed & sent with pt  Follow Up Appointments: arranged; information given  Belongings: All sent with pt  IV: out  Telemetry: off  Pt exhibits understanding of above discharge instructions; all questions answered.    Discharge Paperwork: Reviewed and sent home with patient.     Manju ROBBINS RN

## 2024-06-16 NOTE — PLAN OF CARE
Hours of care [id number]    Neuro: A&Ox4.   Cardiac: SR-ST. VSS.   Respiratory: Sating >95% on RA.  GI/: Adequate urine output. No BM overnight.  Diet/appetite: Tolerating regular diet. Eating well.  Activity:  independent  Pain: At acceptable level on current regimen. Scheduled tylenol and robaxin used for mild incisional pain.  Skin: No new deficits noted. Sternal incision and old CT sites CDI.  LDA's: PIV - Heparin gtt @ 1500 units/hour (Therapeutic x1, next Xa draw at 11:10AM)    Labs: INR today is 1.88. K and Phos WNL, rechecks ordered for tomorrow per protocol.    Plan: Discharge when INR is therapeutic. Continue with POC. Notify primary team with changes.

## 2024-06-17 LAB
ATRIAL RATE - MUSE: NORMAL BPM
DIASTOLIC BLOOD PRESSURE - MUSE: NORMAL MMHG
INTERPRETATION ECG - MUSE: NORMAL
P AXIS - MUSE: NORMAL DEGREES
PR INTERVAL - MUSE: NORMAL MS
QRS DURATION - MUSE: 90 MS
QT - MUSE: 346 MS
QTC - MUSE: 450 MS
R AXIS - MUSE: 32 DEGREES
SYSTOLIC BLOOD PRESSURE - MUSE: NORMAL MMHG
T AXIS - MUSE: -30 DEGREES
VENTRICULAR RATE- MUSE: 102 BPM

## 2024-06-18 ENCOUNTER — PATIENT OUTREACH (OUTPATIENT)
Dept: CARE COORDINATION | Facility: CLINIC | Age: 39
End: 2024-06-18
Payer: COMMERCIAL

## 2024-06-18 ENCOUNTER — TELEPHONE (OUTPATIENT)
Dept: OTHER | Facility: CLINIC | Age: 39
End: 2024-06-18
Payer: COMMERCIAL

## 2024-06-18 NOTE — PROGRESS NOTES
"Clinic Care Coordination Contact  Transitions of Care Outreach  Chief Complaint   Patient presents with    Clinic Care Coordination - Post Hospital       Most Recent Admission Date: 6/11/2024   Most Recent Admission Diagnosis: AS (aortic stenosis) - I35.0     Most Recent Discharge Date: 6/16/2024   Most Recent Discharge Diagnosis: S/P AVR (aortic valve replacement) - Z95.2     Transitions of Care Assessment    Discharge Assessment  How are you doing now that you are home?: \" Doing good \"  How are your symptoms? (Red Flag symptoms escalate to triage hotline per guidelines): Improved  Do you know how to contact your clinic care team if you have future questions or changes to your health status? : Yes  Does the patient have their discharge instructions? : Yes  Does the patient have questions regarding their discharge instructions? : (S) Yes (see comment) (\"Was a error in there\" gave patient Patient Relations #)  Were you started on any new medications or were there changes to any of your previous medications? : Yes  Does the patient have all of their medications?: Yes  Do you have questions regarding any of your medications? : No  Do you have all of your needed medical supplies or equipment (DME)?  (i.e. oxygen tank, CPAP, cane, etc.): Yes    Post-op ([facility] CTA Only)  If the patient had a surgery or procedure, do they have any questions for a nurse?: No         CCRC Explained and offered Care Coordination support to eligible patients: No    Patient accepted? No    Follow up Plan     Discharge Follow-Up  Discharge follow up appointment scheduled in alignment with recommended follow up timeframe or Transitions of Risk Category? (Low = within 30 days; Moderate= within 14 days; High= within 7 days): Yes  Discharge Follow Up Appointment Date: 06/18/24  Discharge Follow Up Appointment Scheduled with?: Specialty Care Provider    Future Appointments   Date Time Provider Department Center   6/25/2024  2:15 PM 3, [facility] " [facility]   7/2/2024  1:45 PM UC [facility]   8/15/2024  9:15 AM Mariel Cuevas MD [facility]       Outpatient Plan as outlined on AVS reviewed with patient.    For any urgent concerns, please contact our 24 hour nurse triage line: [phone] ([phone])       Angelina Foster MA

## 2024-06-18 NOTE — TELEPHONE ENCOUNTER
48 hour post op call    ACTIVITY    How are you doing with activity? I am doing well, 30 min's of walking today.     Are you continuing to use your IS 3-4 times a day and do you have any shortness of breath. Good, IS 2500 ml.     Are you weighing yourself daily and has it been stable?. I was 2 lbs up today but I am very constipated. I took Mirlax and fiber and got relief. No fluid retention.     PAIN  How is your pain, what are you doing for your pain? Discomfort, Tylenol and methocarbamol 2 X a day.      Are you still doing sternal precautions? Yes    Do you hear any clicking when you are moving or taking a deep breath? No      INCISION:   Red rash developed across my stomach and one on my thigh. It does not itch, it is sensitive. Recommended Benadryl at hs. PCP later today. Incision's look good, no drainage. CT's are drying up.     ASSISTANCE  Do you have someone at home to help you with your daily activities and transportation needs? Wife       MEDICATIONS  I would like to review your discharge medications and answer any questions you may have.   Reviewed      Are you on a blood thinner/Coumadin  Yes     Who is managing your Coumadin dosing/INR? ALEX Mccollum [facility] team. INR today. 2.5        FOLLOW UP       Scheduled for cardiac rehab? 6/25   Scheduled to see our PA or Surgeon? 7/2  Scheduled to see your cardiologist ? Dr Cuevas 8/15  Scheduled to see SHERRY/Core Na   Scheduled to see your primary care physician? Dr Mccollum   Do you have our contact information? Yes

## 2024-06-20 ENCOUNTER — TELEPHONE (OUTPATIENT)
Dept: OTHER | Facility: CLINIC | Age: 39
End: 2024-06-20
Payer: COMMERCIAL

## 2024-06-20 NOTE — H&P
CV ICU H&P     ASSESSMENT:  Adam Garcia is a 38 year old male with PMH of tobacco use, ADHD, severe AR. Presents to [facility] for AVR by Dr. Kat on 6/11. Extubated in OR.      Signout:  - Easy airway, left radial A-line, RIJ MAC, no swan, hands free. CT Meds x2.   - Median sternotomy, replacement mechanical valve. V-wires, hasn't needed, shocked 4 times coming off.   - Fent 500 mcg, 1.5 mg Dilaudid, Versed 4 mg, ketamine 50 mg. Vanco 1500 mg, Ancef 2 g  - IVF 3L, Cell saver 530cc, UOP 550cc  - Reversed and extubated in OR, arrived on facemask 4L/min, no pressors or sedation.     Warfarin tomorrow night, no bridge with heparin        CO-MORBIDITIES:       Patient Active Problem List   Diagnosis    S/P AVR (aortic valve replacement)            PLAN:  Neuro/ pain/ sedation:  - Monitor neurological status. Notify the MD for any acute changes in exam.  #ADHD  - Hold PTA Adderall until extubated  #Acute postoperative pain  - Scheduled: Tylenol  - PRN: Tylenol, oxycodone, Dilaudid     Pulmonary care:   #Mechanical ventilation  Resp: 18  - Goal SpO2 > 92%  - Encourage IS q15-30 minutes when awake.  - Restart PTA Zyrtec and Flonase when able     Cardiovascular:    #Cardiogenic shock, considered and ruled out  #Severe AR s/p AVR  Pre CPBP: 45-50%, severe AR prolapse left coronary cusp, some prolapse non-coronary cusp.   Post CPBP: No AR, similar EF, no abnormalities.  - No pressors. Considered cardiogenic shock but no pressors and stable so ruled out  - Goal MAP >65, SBP <140, monitor hemodynamics  - Hold Statin and BB   - ASA: start tomorrow     GI care / Nutrition:   - Bedside swallow eval, advance diet as tolerated  - PPI  - Bowel regimen: MiraLAX, senna     Renal / Fluids / Electrolytes:   BL creat appears to be ~ 0.8  - Strict I/O, daily weights, avoid/limit nephrotoxins  - Replete lytes PRN per protocol     Endocrine:    #Stress hyperglycemia  Preop A1c 5.2  - Insulin gtt  - Goal BG <180 for optimal  healing     ID / Antibiotics:  #Stress induced leukocytosis  - To complete perioperative regimen  - Monitor fever curve, WBC, and inflammatory markers as appropriate     Heme:     #Acute blood loss anemia  #Acute blood loss thrombocytopenia  No s/sx active bleeding  - CBC   - Hgb goal > 7.0  - Hgb stable at 11.8  - Warfarin tomorrow night, no heparin bridge      MSK / Skin:  #Sternotomy  #Surgical Incision  - Sternal precautions, postop incision management protocol  - PT/OT/CR     Prophylaxis:     - Mechanical DVT ppx  - Chemical DVT ppx: Start SQH and Warfarin tomorrow  - PPI     Lines / Tubes / Drains:  - RIJ CVC  - Arterial Line  - CTs x2 (Meds)  - Matthew     Disposition:  - CVICU        Patient seen, findings and plan discussed with CVICU staff and cardiothoracic surgeons and fellow.     Mike Ziegler MD  Anesthesiology Resident, CA-2, PGY-3           ====================================     HPI:   Adam Garcia is a 38 year old male with PMH of tobacco use, ADHD, severe AR. Presents to [facility] for AVR by Dr. Kat on 6/11. Presents to CVICU after being extubated in OR and on 4 L/min facemask. Doing well.        PAST MEDICAL HISTORY:   Past Medical History        Past Medical History:   Diagnosis Date    ADHD (attention deficit hyperactivity disorder)      Obesity              PAST SURGICAL HISTORY:   Past Surgical History         Past Surgical History:   Procedure Laterality Date    NO HISTORY OF SURGERY                FAMILY HISTORY:   Family History         Family History   Problem Relation Age of Onset    Valvular heart disease No family hx of              SOCIAL HISTORY:   Social History            Tobacco Use    Smoking status: Former       Types: Cigarettes    Smokeless tobacco: Never   Substance Use Topics    Alcohol use: Yes       Comment: 3-4 drinks week            OBJECTIVE:  1. VITAL SIGNS:   Temp:  [36.9  C (98.4  F)] 36.9  C (98.4  F)  Pulse:  [83] 83  Resp:  [18] 18  BP: (131)/(59)  131/59  SpO2:  [97 %] 97 %     Resp: 18          2. INTAKE/ OUTPUT:   No intake/output data recorded.        3. PHYSICAL EXAMINATION:   General: no acute distress  Neuro: drowsy but oriented  Resp: non-labored  CV: S1, S2, RRR, no m/r/g   Abdomen: Soft, non-distended, non-tender  Incisions: c/d/i  Extremities: warm and well perfused  CT: To suction, serosang output, no airleak or crepitus        4. INVESTIGATIONS:   Arterial Blood Gases          Recent Labs   Lab 06/11/24  1222 06/11/24  1128 06/11/24  1102 06/11/24  1022   PH 7.40 7.36 7.35 7.33*   PCO2 41 44 45 49*   PO2 296* 161* 309* 244*   HCO3 25 25 25 26      Complete Blood Count          Recent Labs   Lab 06/11/24  1222 06/11/24  1143 06/11/24  1128 06/11/24  1102   WBC  --  9.2  --   --    HGB 12.5* 11.8* 12.1* 13.0*   PLT  --  162  --   --       Basic Metabolic Panel          Recent Labs   Lab 06/11/24  1316 06/11/24  1222 06/11/24  1128 06/11/24  1102 06/11/24  1022   NA  --  139 138 139 140   POTASSIUM  --  4.3 4.5 4.8 4.7   * 115* 126* 137* 113*      Liver Function Tests      Recent Labs   Lab 06/11/24  1143   INR 1.38*      Pancreatic Enzymes  No lab results found in last 7 days.  Coagulation Profile      Recent Labs   Lab 06/11/24  1143   INR 1.38*   PTT 83*            5. RADIOLOGY:       Recent Results (from the past 24 hour(s))   YOHANNES with Report     Narrative     Behrens, Christopher J, MD     6/11/2024 12:39 PM  Perioperative YOHANNES Procedure Note     Staff -        Anesthesiologist:  Behrens, Christopher J, MD       Performed By: anesthesiologist  Preanesthesia Checklist:  Patient identified, IV assessed, risks and   benefits discussed, monitors and equipment assessed, procedure being   performed at surgeon's request and anesthesia consent obtained.     YOHANNES Probe Insertion     Probe Status PRE Insertion: NO obvious damage  Probe type:  Adult 3D  Bite block used:   Soft  Insertion Technique: Jaw Lift  Insertion complications: None  obvious  Billing Report:YOHANNES report by Anesthesiologist (See Separate Report note)  Probe Status POST Removal: NO obvious damage     YOHANNES Report  General Procedure Information  Images for this study have been archived.  Modalities: 2D, 3D, CW Doppler, PW Doppler and Color flow mapping  Echocardiographic and Doppler Measurements  Right Ventricle:  Cavity size normal.    Hypertrophy not present.     Thrombus not present.    Global function normal.     Left Ventricle:  Cavity size normal.    Hypertrophy not present.     Thrombus not present.   Global Function mildly impaired.        Ventricular Regional Function:  1- Basal Anteroseptal:  normal  2- Basal Anterior:  normal  3- Basal Anterolateral:  normal  4- Basal Inferolateral:  normal  5- Basal Inferior:  normal  6- Basal Inferoseptal:  normal  7- Mid Anteroseptal:  normal  8- Mid Anterior:  normal  9- Mid Anterolateral:  normal  10- Mid Inferolateral:  normal  11- Mid Inferior:  normal  12- Mid Inferoseptal:  normal  13- Apical Anterior:  normal  14- Apical Lateral:  normal  15- Apical Inferior:  normal  16- Apical Septal:  normal  17- [place]:  normal     Valves  Aortic Valve: Annulus normal.  Stenosis not present.  Regurgitation +4.    Leaflets normal.  Leaflet motions prolapsed.    Mitral Valve: Annulus normal.  Stenosis not present.  Regurgitation   absent.  Leaflets normal.  Leaflet motions normal.    Tricuspid Valve: Annulus normal.  Stenosis not present.  Regurgitation   absent.  Leaflets normal.  Leaflet motions normal.    Pulmonic Valve: Annulus normal.  Stenosis not present.  Regurgitation   absent.       Aorta: Ascending Aorta: Size normal.  Dissection not present.  Plaque   thickness less than 3 mm.  Mobile plaque not present.    Aortic Arch: Size normal.    Dissection not present.   Plaque thickness   less than 3 mm.   Mobile plaque not present.    Descending Aorta: Size normal.    Dissection not present.   Plaque   thickness less than 3 mm.   Mobile plaque  not present.       Right Atrium:  Size normal.   Spontaneous echo contrast not present.     Thrombus not present.   Tumor not present.   Device not present.     Left Atrium: Size normal.  Spontaneous echo contrast not present.    Thrombus not present.  Tumor not present.  Device not present.    Left atrial appendage normal.     Atrial Septum: Intra-atrial septal morphology normal.       Ventricular Septum: Intra-ventricular septum morphology normal.            Post Intervention Findings  Procedure(s) performed:  Aortic Valve Repair/Replace. Global function:    Unchanged. Regional wall motion: Unchanged. Surgeon(s) notified of all   postintervention findings: Yes.             Post Intervention comments: AV: Placement of new mechanical valve. Mean   gradient 9, Normal leaflet morphology and function, no regurgitation.   Washing jets present     Rest of exam not significantly changed compared to pre-op exam.  .     Echocardiogram Comments  Echocardiogram comments: LV: Normal ventricle size with normal global   function, EF 45-50%.   AV: Normal leaflet morphology with normal function. Severe regurgitation   from prolapse of left coronary cusp, there also appears to be some   prolapse of non-coronary cusp. PHT >500, holosystolic reversal of flow in   descending aorta  MV: Normal leaflet morphology and function. No stenosis suspected, no   regurgitation noted.  RV: Normal ventricular size with normal function. EF 45%  TV: Normal leaflet morphology and function. No stenosis suspected with   trace regurgitation noted.  PV:Normal leaflet morphology and function. No stenosis suspected, no   regurgitation noted.  Aorta: Diameter within normal limits. No evidence of aneurysm, hematoma or   dissection. Grade 0 atherosclerotic disease.  .         =========================================         Cosigned by: Mikayla Mcknight MD at 6/11/2024  7:40 PM     Revision History    Routing History

## 2024-06-20 NOTE — TELEPHONE ENCOUNTER
Patient states he awoken this am with drainage on his t-shirt. Looks like coming from top of incision. Clear,pink tinged. Requested picture e-mailed. Will review with provider. No fever.

## 2024-06-20 NOTE — TELEPHONE ENCOUNTER
Incision picture reviewed by Ifeoma Delatorre PA-C. Will monitor. Reviewed wound care instructions with pt and recommended a lower cut shirt to avoid rubbing, keep open to air. Call if drainage increases or looks purulent or and send another picture if changes. States understanding of instructions.

## 2024-06-25 ENCOUNTER — HOSPITAL ENCOUNTER (OUTPATIENT)
Dept: CARDIAC REHAB | Facility: CLINIC | Age: 39
Discharge: HOME | End: 2024-06-25
Attending: PHYSICIAN ASSISTANT
Payer: COMMERCIAL

## 2024-06-25 ENCOUNTER — TELEPHONE (OUTPATIENT)
Dept: OTHER | Facility: CLINIC | Age: 39
End: 2024-06-25
Payer: COMMERCIAL

## 2024-06-25 DIAGNOSIS — Z95.2 S/P AVR (AORTIC VALVE REPLACEMENT): ICD-10-CM

## 2024-06-25 PROCEDURE — 93797 PHYS/QHP OP CAR RHAB WO ECG: CPT

## 2024-06-25 PROCEDURE — 93798 PHYS/QHP OP CAR RHAB W/ECG: CPT

## 2024-06-25 NOTE — TELEPHONE ENCOUNTER
Patient called stating his weight has steadily gone up the last few days. Wt 229 on 6/23, he has gained 4 lbs 2 days in a row. Denies any shortness of breath, edema. 's/60's, HR 76. States his appetite has come back and is eating more than 3 meals a day. Baseline wt pre op was mid 230's. Recommended continue to monitor and call if continues to increase in wt, shortness of breath, edema. Patient states understanding of plan. CV surgery follow up 7/2.

## 2024-06-26 ENCOUNTER — HOSPITAL ENCOUNTER (OUTPATIENT)
Dept: CARDIAC REHAB | Facility: CLINIC | Age: 39
Discharge: HOME | End: 2024-06-26
Attending: PHYSICIAN ASSISTANT
Payer: COMMERCIAL

## 2024-06-26 PROCEDURE — 93798 PHYS/QHP OP CAR RHAB W/ECG: CPT

## 2024-06-28 ENCOUNTER — HOSPITAL ENCOUNTER (OUTPATIENT)
Dept: CARDIAC REHAB | Facility: CLINIC | Age: 39
Discharge: HOME | End: 2024-06-28
Attending: PHYSICIAN ASSISTANT
Payer: COMMERCIAL

## 2024-06-28 PROCEDURE — 93798 PHYS/QHP OP CAR RHAB W/ECG: CPT | Performed by: REHABILITATION PRACTITIONER

## 2024-07-01 ENCOUNTER — HOSPITAL ENCOUNTER (OUTPATIENT)
Dept: CARDIAC REHAB | Facility: CLINIC | Age: 39
Discharge: HOME | End: 2024-07-01
Attending: PHYSICIAN ASSISTANT
Payer: COMMERCIAL

## 2024-07-01 PROCEDURE — 93798 PHYS/QHP OP CAR RHAB W/ECG: CPT

## 2024-07-01 NOTE — PROGRESS NOTES
CARDIOTHORACIC SURGERY FOLLOW-UP VISIT     Adam Garcia   1985   [id number]      Reason for visit: Post-op clinic visit    ASSESSMENT/PLAN:  Adam Garcia is a 38 year old male who is status post AVR with Dr. Kat on 6/11/2024    Patient Active Problem List   Diagnosis    S/P AVR (aortic valve replacement)        Surgically doing well. Surgical pain is overall controlled with only occasional APAP though recently experience a right wrist injury, unrelated to his cardiac surgery and this is apparently quite painful. Midline sternal incision healing well without signs of infection however there was mild superficial dehiscence at the apex of his incision secondary to stitch spitting and the 0 knot was trimmed back. Increasing activity and strength.  Sounds to be in a regular rhythm with HR <100 bpm.  Appears grossly euvolemic.    Hemodynamics are stable. Medications reviewed and no changes were needed today.  Follow up with your cardiologist as scheduled.  Continue Outpatient Cardiac Rehab until completed.   Continue sternal precautions for 12 weeks from surgery date; no casting of fishing rods or reeling in/netting of catches until off sternal precautions.  No driving for 4 weeks from surgery date and then only when you feel confident all the necessary functions can be carried out without undue pain or risk to your sternum.  May return to sex as tolerated though would avoid positions that would be in conflict with your sternal precautions.  With respect to the treatment of his wrist pain, advised to avoid or defer systemic steroids while he is recovering from his cardiac surgery; he has already received a corticosteroid injection to the wrist in the setting of orthopedic urgent care.     Postoperative restrictions have been reviewed and all of the patient's questions were answered. Our contact information was given to the patient if he should have any further questions/concerns. No further follow up is  needed with us.  The total time spent with the patient was 25 minutes, > 50% of which was spent in counseling and coordination of care.    Swapna Ramirez, CNP, [facility]  Cardiothoracic Surgery  [employer] Pager [phone]    __________________________________________________________________________________    HPI: Adam Garcia is a 38 year old male with a PMH of severe aortic stenosis.  Presents to clinic for a follow-up appointment after surgery. Hospital course was remarkable for short burst of AF. Patient was discharged home on 6/16/24.    Mr. Garcia presents to clinic with his spouse.  Since discharge, has been recovering well.  States pain is controlled with infrequent use of APAP. Sleep is restful, he is sleeping in the recliner in the living room presently. Participating in cardiac.   Denies SOB, PND, orthopnea. Non cough.   Patient is on Coumadin and INR is therapeutic - yesterday's INR 3.1.   Has had a strong appetite. Having regular bowel movements though slightly slower than his baseline however this seems to be returning to normal with the assistance of occasional Miralax use.   Denies sternal popping/clicking/snapping; he has noted periodic incisional drainage from the apex of his incision and the left chest tube site but both of these are without TTP/erythema/induration.  No psychiatric concerns.    ROS:  Review of symptoms otherwise negative unless commented about in HPI.     LABS:    CBC RESULTS:   Recent Labs   Lab Test 06/16/24  0906 06/15/24  0725 06/15/24  0555   WBC 5.3 7.0 6.4   HGB 11.6* 12.2* 12.5*   HCT 32.3* 35.1* 34.7*    206 206       CMP RESULTS:  Recent Labs   Lab Test 06/16/24  0906 06/16/24  0423 06/15/24  0555 06/14/24  0703 06/12/24  0446 06/12/24  0423 06/11/24  1448 06/11/24  1318 06/11/24  0708 05/28/24  0918   *  --  138 136   < > 135  --  139   < > 140   POTASSIUM 4.4 4.0 4.0 4.1   < > 4.2  --  4.4   < > 4.1   CHLORIDE 102  --  103 102   < > 102  --  107   --  105   CO2 23  --  26 24   < > 25  --  24  --  26   ANIONGAP 9  --  9 10   < > 8  --  8  --  9   GLC 97  --  102* 109*   < > 159*   < > 137*   < > 87   BUN 8.1  --  8.8 6.3   < > 6.3  --  10.4  --  12.5   CR 0.69  --  0.69 0.65*   < > 0.70  --  0.84  --  0.78   GFRESTIMATED >90  --  >90 >90   < > >90  --  >90  --  >90   MARKUS 8.9  --  9.1 8.9   < > 8.5*  --  8.1*  --  9.0   BILITOTAL  --   --   --   --   --  0.7  --  0.7  --  0.5   ALKPHOS  --   --   --   --   --  52  --  57  --  65   ALT  --   --   --   --   --  12  --  12  --  10   AST  --   --   --   --   --  28  --  27  --  18    < > = values in this interval not displayed.     Recent Labs   Lab Test 06/16/24  0423 06/15/24  0555 06/14/24  0703 06/13/24  0441 06/12/24  0912   INR 1.88* 1.60* 1.37* 1.15 1.23*       IMAGING:  None    PHYSICAL EXAM:   /75 (BP Location: Left arm, Patient Position: Chair, Cuff Size: Adult Large)   Pulse 87   Wt 107.8 kg (237 lb 9.6 oz)   SpO2 95%   BMI 36.13 kg/m      General: appropriately groomed, pleasant, no acute distress, normal mood and affect, calm, conversant, cooperative on exam  Neuro: A&O, stable gait, face symmetric, speech clear, BRUNO, no focal deficits   CV: valve click, trace peripheral edema  Pulm:  unlabored breathing on RA, no cough  Incision: incisions clean and dry without erythema, induration, swelling or drainage, 2-3mm superficial dehiscence at apex of sternotomy with underlying dyed suture knot stack; right chest tube site scabbed over, ANSON, no drainage, erythema, or induration; left chest tube site with immature scab, no drainage, erythema or induration    PROCEDURES:    Blunt debridement of left chest tube site and apex of sternotomy incision with saline soaked sterile cotton swab and trimming of foreign suture material at apex of sternal incision.  Tissues appeared healthy with trace bleeding at the edges; no purulent drainage encountered.    CURRENT MEDICATIONS:   Current Outpatient Medications    Medication Sig Dispense Refill    acetaminophen (TYLENOL) 500 MG tablet Take 1-2 tablets (500-1,000 mg) by mouth every 8 hours as needed for other (For optimal non-opioid multimodal pain management to improve pain control.) 120 tablet 0    aluminum chloride (DRYSOL) 20 % external solution Apply topically daily as needed (Sweating)      amphetamine-dextroamphetamine (ADDERALL) 30 MG tablet Take 30 mg by mouth 2 times daily      aspirin (ASA) 81 MG chewable tablet Take 1 tablet (81 mg) by mouth daily 60 tablet 0    cetirizine (ZYRTEC) 5 MG tablet Take 5 mg by mouth every morning      clindamycin (CLEOCIN T) 1 % external solution Apply topically daily as needed (Acne)      fluticasone (FLONASE) 50 MCG/ACT nasal spray Spray 1 spray into both nostrils 2 times daily as needed for allergies      methocarbamol (ROBAXIN) 750 MG tablet Take 1 tablet (750 mg) by mouth 4 times daily as needed for muscle spasms or other (acute post-operative pain) 60 tablet 0    metoprolol tartrate (LOPRESSOR) 25 MG tablet Take 1 tablet (25 mg) by mouth 2 times daily 60 tablet 0    pantoprazole (PROTONIX) 40 MG EC tablet Take 1 tablet (40 mg) by mouth daily 21 tablet 0    polyethylene glycol (MIRALAX) 17 GM/Dose powder Take 17 g by mouth 2 times daily as needed for constipation 510 g 0    warfarin ANTICOAGULANT (COUMADIN) 5 MG tablet Take 1.5 tablets (7.5 mg) at the same time each evening on 6/16 and 6/17. You should have an INR checked on 6/17. Future warfarin dosing should come from your anticoagulation clinic. Always follow the most recent instructions from your anticoagulation clinic. Do not skip this medication. 45 tablet 0     No current facility-administered medications for this visit.       CC  Bisi Mccollum

## 2024-07-01 NOTE — ANESTHESIA POSTPROCEDURE EVALUATION
Patient: Adam Garcia    Procedure: Procedure(s):  Median Sternotmy, AORTIC VALVE REPLACMENT with 27/29 On-X Prosthetic Heart Valve with Anatomical Sewing Ring, On Cardiopulmonary Bypass, ECHOCARDIOGRAM, TRANSESOPHAGEAL, WITH ANESTHESIA       Anesthesia Type:  General    Note:  Disposition: ICU            ICU Sign Out: Anesthesiologist/ICU physician sign out WAS performed   Postop Pain Control: Uneventful            Sign Out: Well controlled pain   PONV: No   Neuro/Psych: Uneventful            Sign Out: Acceptable/Baseline neuro status   Airway/Respiratory: Uneventful            Sign Out: AIRWAY IN SITU/Resp. Support               Airway in situ/Resp. Support: ETT                 Reason: Planned Pre-op   CV/Hemodynamics: Uneventful            Sign Out: Acceptable CV status   Other NRE: NONE   DID A NON-ROUTINE EVENT OCCUR? No           Last vitals:  Vitals:    06/16/24 0425 06/16/24 0755 06/16/24 1206   BP: 129/76 116/82 131/89   Pulse: 84 83 92   Resp: 18 16 20   Temp: 36.7  C (98  F) 36.7  C (98  F) 36.7  C (98  F)   SpO2: 100% 97% 99%       Electronically Signed By: Christopher J. Behrens, MD  July 1, 2024  7:54 AM

## 2024-07-02 ENCOUNTER — OFFICE VISIT (OUTPATIENT)
Dept: CARDIOLOGY | Facility: CLINIC | Age: 39
End: 2024-07-02
Attending: SURGERY
Payer: COMMERCIAL

## 2024-07-02 VITALS
DIASTOLIC BLOOD PRESSURE: 75 MMHG | HEART RATE: 87 BPM | OXYGEN SATURATION: 95 % | WEIGHT: 237.6 LBS | SYSTOLIC BLOOD PRESSURE: 109 MMHG | BODY MASS INDEX: 36.13 KG/M2

## 2024-07-02 DIAGNOSIS — Z95.2 S/P AVR (AORTIC VALVE REPLACEMENT): Primary | ICD-10-CM

## 2024-07-02 PROCEDURE — 99024 POSTOP FOLLOW-UP VISIT: CPT | Performed by: SURGERY

## 2024-07-02 PROCEDURE — 99213 OFFICE O/P EST LOW 20 MIN: CPT

## 2024-07-02 ASSESSMENT — PAIN SCALES - GENERAL: PAINLEVEL: NO PAIN (0)

## 2024-07-02 NOTE — LETTER
7/2/2024      RE: Adam Garcia  [address]  [place] [address]       Dear Colleague,    Thank you for the opportunity to participate in the care of your patient, Adam Garcia, at the [facility]. Please see a copy of my visit note below.    CARDIOTHORACIC SURGERY FOLLOW-UP VISIT     Adam Garcia   1985   [id number]      Reason for visit: Post-op clinic visit    ASSESSMENT/PLAN:  Adam Garcia is a 38 year old male who is status post AVR with Dr. Kat on 6/11/2024    Patient Active Problem List   Diagnosis    S/P AVR (aortic valve replacement)        Surgically doing well. Surgical pain is overall controlled with only occasional APAP though recently experience a right wrist injury, unrelated to his cardiac surgery and this is apparently quite painful. Midline sternal incision healing well without signs of infection however there was mild superficial dehiscence at the apex of his incision secondary to stitch spitting and the 0 knot was trimmed back. Increasing activity and strength.  Sounds to be in a regular rhythm with HR <100 bpm.  Appears grossly euvolemic.    Hemodynamics are stable. Medications reviewed and no changes were needed today.  Follow up with your cardiologist as scheduled.  Continue Outpatient Cardiac Rehab until completed.   Continue sternal precautions for 12 weeks from surgery date; no casting of fishing rods or reeling in/netting of catches until off sternal precautions.  No driving for 4 weeks from surgery date and then only when you feel confident all the necessary functions can be carried out without undue pain or risk to your sternum.  May return to sex as tolerated though would avoid positions that would be in conflict with your sternal precautions.  With respect to the treatment of his wrist pain, advised to avoid or defer systemic steroids while he is recovering from his  cardiac surgery; he has already received a corticosteroid injection to the wrist in the setting of orthopedic urgent care.     Postoperative restrictions have been reviewed and all of the patient's questions were answered. Our contact information was given to the patient if he should have any further questions/concerns. No further follow up is needed with us.  The total time spent with the patient was 25 minutes, > 50% of which was spent in counseling and coordination of care.    Swapna Ramirez, CNP, [facility]  Cardiothoracic Surgery  [employer] Pager [phone]    __________________________________________________________________________________    HPI: Adam Garcia is a 38 year old male with a PMH of severe aortic stenosis.  Presents to clinic for a follow-up appointment after surgery. Hospital course was remarkable for short burst of AF. Patient was discharged home on 6/16/24.    Mr. Garcia presents to clinic with his spouse.  Since discharge, has been recovering well.  States pain is controlled with infrequent use of APAP. Sleep is restful, he is sleeping in the recliner in the living room presently. Participating in cardiac.   Denies SOB, PND, orthopnea. Non cough.   Patient is on Coumadin and INR is therapeutic - yesterday's INR 3.1.   Has had a strong appetite. Having regular bowel movements though slightly slower than his baseline however this seems to be returning to normal with the assistance of occasional Miralax use.   Denies sternal popping/clicking/snapping; he has noted periodic incisional drainage from the apex of his incision and the left chest tube site but both of these are without TTP/erythema/induration.  No psychiatric concerns.    ROS:  Review of symptoms otherwise negative unless commented about in HPI.     LABS:    CBC RESULTS:   Recent Labs   Lab Test 06/16/24  0906 06/15/24  0725 06/15/24  0555   WBC 5.3 7.0 6.4   HGB 11.6* 12.2* 12.5*   HCT 32.3* 35.1* 34.7*    206 206        CMP RESULTS:  Recent Labs   Lab Test 06/16/24  0906 06/16/24  0423 06/15/24  0555 06/14/24  0703 06/12/24  0446 06/12/24 0423 06/11/24  1448 06/11/24  1318 06/11/24  0708 05/28/24  0918   *  --  138 136   < > 135  --  139   < > 140   POTASSIUM 4.4 4.0 4.0 4.1   < > 4.2  --  4.4   < > 4.1   CHLORIDE 102  --  103 102   < > 102  --  107  --  105   CO2 23  --  26 24   < > 25  --  24  --  26   ANIONGAP 9  --  9 10   < > 8  --  8  --  9   GLC 97  --  102* 109*   < > 159*   < > 137*   < > 87   BUN 8.1  --  8.8 6.3   < > 6.3  --  10.4  --  12.5   CR 0.69  --  0.69 0.65*   < > 0.70  --  0.84  --  0.78   GFRESTIMATED >90  --  >90 >90   < > >90  --  >90  --  >90   MARKUS 8.9  --  9.1 8.9   < > 8.5*  --  8.1*  --  9.0   BILITOTAL  --   --   --   --   --  0.7  --  0.7  --  0.5   ALKPHOS  --   --   --   --   --  52  --  57  --  65   ALT  --   --   --   --   --  12  --  12  --  10   AST  --   --   --   --   --  28  --  27  --  18    < > = values in this interval not displayed.     Recent Labs   Lab Test 06/16/24  0423 06/15/24  0555 06/14/24  0703 06/13/24  0441 06/12/24  0912   INR 1.88* 1.60* 1.37* 1.15 1.23*       IMAGING:  None    PHYSICAL EXAM:   /75 (BP Location: Left arm, Patient Position: Chair, Cuff Size: Adult Large)   Pulse 87   Wt 107.8 kg (237 lb 9.6 oz)   SpO2 95%   BMI 36.13 kg/m      General: appropriately groomed, pleasant, no acute distress, normal mood and affect, calm, conversant, cooperative on exam  Neuro: A&O, stable gait, face symmetric, speech clear, BRUNO, no focal deficits   CV: valve click, trace peripheral edema  Pulm:  unlabored breathing on RA, no cough  Incision: incisions clean and dry without erythema, induration, swelling or drainage, 2-3mm superficial dehiscence at apex of sternotomy with underlying dyed suture knot stack; right chest tube site scabbed over, ANSON, no drainage, erythema, or induration; left chest tube site with immature scab, no drainage, erythema or  induration    PROCEDURES:    Blunt debridement of left chest tube site and apex of sternotomy incision with saline soaked sterile cotton swab and trimming of foreign suture material at apex of sternal incision.  Tissues appeared healthy with trace bleeding at the edges; no purulent drainage encountered.    CURRENT MEDICATIONS:   Current Outpatient Medications   Medication Sig Dispense Refill    acetaminophen (TYLENOL) 500 MG tablet Take 1-2 tablets (500-1,000 mg) by mouth every 8 hours as needed for other (For optimal non-opioid multimodal pain management to improve pain control.) 120 tablet 0    aluminum chloride (DRYSOL) 20 % external solution Apply topically daily as needed (Sweating)      amphetamine-dextroamphetamine (ADDERALL) 30 MG tablet Take 30 mg by mouth 2 times daily      aspirin (ASA) 81 MG chewable tablet Take 1 tablet (81 mg) by mouth daily 60 tablet 0    cetirizine (ZYRTEC) 5 MG tablet Take 5 mg by mouth every morning      clindamycin (CLEOCIN T) 1 % external solution Apply topically daily as needed (Acne)      fluticasone (FLONASE) 50 MCG/ACT nasal spray Spray 1 spray into both nostrils 2 times daily as needed for allergies      methocarbamol (ROBAXIN) 750 MG tablet Take 1 tablet (750 mg) by mouth 4 times daily as needed for muscle spasms or other (acute post-operative pain) 60 tablet 0    metoprolol tartrate (LOPRESSOR) 25 MG tablet Take 1 tablet (25 mg) by mouth 2 times daily 60 tablet 0    pantoprazole (PROTONIX) 40 MG EC tablet Take 1 tablet (40 mg) by mouth daily 21 tablet 0    polyethylene glycol (MIRALAX) 17 GM/Dose powder Take 17 g by mouth 2 times daily as needed for constipation 510 g 0    warfarin ANTICOAGULANT (COUMADIN) 5 MG tablet Take 1.5 tablets (7.5 mg) at the same time each evening on 6/16 and 6/17. You should have an INR checked on 6/17. Future warfarin dosing should come from your anticoagulation clinic. Always follow the most recent instructions from your anticoagulation clinic.  Do not skip this medication. 45 tablet 0     No current facility-administered medications for this visit.       CC  Bisi Mccollum        Please do not hesitate to contact me if you have any questions/concerns.     Sincerely,     Cardiovascular Thoracic Surgery

## 2024-07-02 NOTE — PATIENT INSTRUCTIONS
- No bathing, soaking, swimming, or hot tubs; no immersion in standing water.  OK to shower/let water run over wound but avoid aiming stream of water directly at the wound.  OK to gently wash with soap and water but do not scrub.  Wound may be left open to air, no dressing/bandage is necessary.  You have dissolvable stitches under your skin; there is noting that needs to be removed.  Any remaining skin glue can be removed.   - May resume driving on 7/11 if no longer taking prescription pain medications   - No need to return to see Cardiovascular Surgery unless you should develop problems with your wounds   - Follow up with your cardiologist and cardiac rehab as planned   - Continue sternal precautions - no more than 10 lbs pushing, pulling, or lifting for the first 8 weeks after surgery, then up to 20 lbs for weeks 9-12.

## 2024-07-02 NOTE — NURSING NOTE
Chief Complaint   Patient presents with    Follow Up     POST-OP        Vitals were taken, medications reconciled.    Alek Masters, Clinic Assistant   1:39 PM

## 2024-07-08 ENCOUNTER — HOSPITAL ENCOUNTER (OUTPATIENT)
Dept: CARDIAC REHAB | Facility: CLINIC | Age: 39
Discharge: HOME | End: 2024-07-08
Attending: PHYSICIAN ASSISTANT
Payer: COMMERCIAL

## 2024-07-08 PROCEDURE — 93798 PHYS/QHP OP CAR RHAB W/ECG: CPT | Performed by: OCCUPATIONAL THERAPIST

## 2024-07-10 ENCOUNTER — HOSPITAL ENCOUNTER (OUTPATIENT)
Dept: CARDIAC REHAB | Facility: CLINIC | Age: 39
Discharge: HOME | End: 2024-07-10
Attending: PHYSICIAN ASSISTANT
Payer: COMMERCIAL

## 2024-07-10 VITALS — WEIGHT: 237.66 LBS | HEIGHT: 68 IN | BODY MASS INDEX: 36.02 KG/M2

## 2024-07-10 PROCEDURE — 93798 PHYS/QHP OP CAR RHAB W/ECG: CPT

## 2024-07-10 PROCEDURE — 93797 PHYS/QHP OP CAR RHAB WO ECG: CPT | Mod: 59

## 2024-07-10 NOTE — PROGRESS NOTES
"NUTRITION ASSESSMENT & EDUCATION NOTE    REASON FOR ASSESSMENT  Adam Garcia is a 38 year old male seen by Registered Dietitian for 1:1 Cardiac Rehab consult     NUTRITION HISTORY  Information obtained from patient, chart  Patient has been instructed to follow a Mediterranean diet.  Previous diet education: has met with dietitians in the past  Patient has attended no nutrition classes offered by cardiac rehab  Nutrition-related goals: lose weight, eat healthy for the heart  Grocery shopping, cooking: patient    ANTHROPOMETRICS  Height: 5' 8\"  Weight: 237 lbs 10.49 oz  Body mass index is 36.14 kg/m .  Weight Status:  Obesity Grade II BMI 35-39.9  IBW: 68.4 kg  % IBW: 158%  Weight History:   Wt Readings from Last 30 Encounters:   07/10/24 107.8 kg (237 lb 10.5 oz)   07/02/24 107.8 kg (237 lb 9.6 oz)   06/16/24 103.9 kg (229 lb 1.6 oz)   05/28/24 107.5 kg (237 lb)   04/16/24 108.9 kg (240 lb)   03/28/24 111.6 kg (246 lb)   03/15/24 112.5 kg (248 lb)         ASSESSED NUTRITION NEEDS PER APPROVED PRACTICE GUIDELINES:  Estimated Energy Needs: 6977-9703 kcals ([place] St Jeor)  Justification: obese  Estimated Protein Needs:  grams protein (1.2-1.6 g pro/Kg)  Justification: obesity guidelines   Estimated Fluid Needs: 3865-8226  mL (30-35 mL/kg)  Justification: maintenance    NUTRITION DIAGNOSIS  Food- and nutrition- related knowledge deficit related to heart health as evidenced by     INTERVENTIONS  Nutrition Prescription:  Cardiac diet:   Low saturated fat, Na <2400 mg  Fiber >25 g per day  Emphasis on plate method for balanced meals     Implementation  Assessed learning needs and learning preference.  Nutrition Education (Content):  Provided handouts:  EWG Clean 15 and Dirty Dozen food lists  Phytonutrient Spectrum of Foods  Low-Sodium Nutrition Therapy (2022)  General, Healthful Mediterranean Nutrition Therapy (2022)  Heart-Healthy Label Reading Tips (2018)  Heart-Healthy Nutrition Therapy (2022)  Healthline " Tips for following heart health  Discussed heart healthy diet recommendations, including label reading, minimizing added salts/saturated fats/sugars, balanced meals TID, heart healthy substitutes, eliminating all trans fat, sugar free beverages  Nutrition Education (Application):  Discussed eating habits and recommended alternative food choices:  Emphasized fruits, vegetables, low sodium nuts/heart healthy fats, fish, low fat dairy  Reviewed recipes and new food ideas such as beans, lentils, vegetables and tofu for an increase in plant based options   Encouraged water and non sugar sweetened beverages   Discussed cooking more from scratch to monitor meal ingredients, portions, menu choices     Goals/Follow up/Monitoring For Cardiac Rehab Staff  Adherence to nutrition related recommendations, follow with cardiac rehab  < 15g saturated fats, < 38g added sugars, 1500-2000mg sodium daily  Additional questions/concerns related to heart healthy guidelines      Martin Veliz RDN,BILL,[facility] Cardiac and Pulmonary Rehab  Office: [phone]    DIRECT PATIENT CARE TIME: 60 MINUTES

## 2024-07-12 ENCOUNTER — HOSPITAL ENCOUNTER (OUTPATIENT)
Dept: CARDIAC REHAB | Facility: CLINIC | Age: 39
Discharge: HOME | End: 2024-07-12
Attending: PHYSICIAN ASSISTANT
Payer: COMMERCIAL

## 2024-07-12 PROCEDURE — 93798 PHYS/QHP OP CAR RHAB W/ECG: CPT

## 2024-07-15 ENCOUNTER — HOSPITAL ENCOUNTER (OUTPATIENT)
Dept: CARDIAC REHAB | Facility: CLINIC | Age: 39
Discharge: HOME | End: 2024-07-15
Attending: PHYSICIAN ASSISTANT
Payer: COMMERCIAL

## 2024-07-15 PROCEDURE — 93798 PHYS/QHP OP CAR RHAB W/ECG: CPT

## 2024-07-17 ENCOUNTER — HOSPITAL ENCOUNTER (OUTPATIENT)
Dept: CARDIAC REHAB | Facility: CLINIC | Age: 39
Discharge: HOME | End: 2024-07-17
Attending: PHYSICIAN ASSISTANT
Payer: COMMERCIAL

## 2024-07-17 PROCEDURE — 93798 PHYS/QHP OP CAR RHAB W/ECG: CPT

## 2024-07-19 ENCOUNTER — HOSPITAL ENCOUNTER (OUTPATIENT)
Dept: CARDIAC REHAB | Facility: CLINIC | Age: 39
Discharge: HOME | End: 2024-07-19
Attending: PHYSICIAN ASSISTANT
Payer: COMMERCIAL

## 2024-07-19 PROCEDURE — 93798 PHYS/QHP OP CAR RHAB W/ECG: CPT

## 2024-07-22 ENCOUNTER — HOSPITAL ENCOUNTER (OUTPATIENT)
Dept: CARDIAC REHAB | Facility: CLINIC | Age: 39
Discharge: HOME | End: 2024-07-22
Attending: INTERNAL MEDICINE
Payer: COMMERCIAL

## 2024-07-22 PROCEDURE — 93797 PHYS/QHP OP CAR RHAB WO ECG: CPT | Mod: 59

## 2024-07-22 PROCEDURE — 93798 PHYS/QHP OP CAR RHAB W/ECG: CPT | Performed by: REHABILITATION PRACTITIONER

## 2024-07-24 ENCOUNTER — HOSPITAL ENCOUNTER (OUTPATIENT)
Dept: CARDIAC REHAB | Facility: CLINIC | Age: 39
Discharge: HOME | End: 2024-07-24
Attending: INTERNAL MEDICINE
Payer: COMMERCIAL

## 2024-07-24 PROCEDURE — 93798 PHYS/QHP OP CAR RHAB W/ECG: CPT

## 2024-07-26 ENCOUNTER — HOSPITAL ENCOUNTER (OUTPATIENT)
Dept: CARDIAC REHAB | Facility: CLINIC | Age: 39
Discharge: HOME | End: 2024-07-26
Attending: INTERNAL MEDICINE
Payer: COMMERCIAL

## 2024-07-26 PROCEDURE — 93798 PHYS/QHP OP CAR RHAB W/ECG: CPT

## 2024-07-29 ENCOUNTER — HOSPITAL ENCOUNTER (OUTPATIENT)
Dept: CARDIAC REHAB | Facility: CLINIC | Age: 39
Discharge: HOME | End: 2024-07-29
Attending: PHYSICIAN ASSISTANT
Payer: COMMERCIAL

## 2024-07-29 PROCEDURE — 93798 PHYS/QHP OP CAR RHAB W/ECG: CPT

## 2024-07-31 ENCOUNTER — HOSPITAL ENCOUNTER (OUTPATIENT)
Dept: CARDIAC REHAB | Facility: CLINIC | Age: 39
Discharge: HOME | End: 2024-07-31
Attending: PHYSICIAN ASSISTANT
Payer: COMMERCIAL

## 2024-07-31 PROCEDURE — 93798 PHYS/QHP OP CAR RHAB W/ECG: CPT

## 2024-08-02 ENCOUNTER — HOSPITAL ENCOUNTER (OUTPATIENT)
Dept: CARDIAC REHAB | Facility: CLINIC | Age: 39
Discharge: HOME | End: 2024-08-02
Attending: PHYSICIAN ASSISTANT
Payer: COMMERCIAL

## 2024-08-02 PROCEDURE — 93798 PHYS/QHP OP CAR RHAB W/ECG: CPT

## 2024-08-05 ENCOUNTER — HOSPITAL ENCOUNTER (OUTPATIENT)
Dept: CARDIAC REHAB | Facility: CLINIC | Age: 39
Discharge: HOME | End: 2024-08-05
Attending: PHYSICIAN ASSISTANT
Payer: COMMERCIAL

## 2024-08-05 PROCEDURE — 93798 PHYS/QHP OP CAR RHAB W/ECG: CPT

## 2024-08-07 ENCOUNTER — HOSPITAL ENCOUNTER (OUTPATIENT)
Dept: CARDIAC REHAB | Facility: CLINIC | Age: 39
Discharge: HOME | End: 2024-08-07
Attending: PHYSICIAN ASSISTANT
Payer: COMMERCIAL

## 2024-08-07 PROCEDURE — 93798 PHYS/QHP OP CAR RHAB W/ECG: CPT

## 2024-08-08 ENCOUNTER — TELEPHONE (OUTPATIENT)
Dept: OTHER | Facility: CLINIC | Age: 39
End: 2024-08-08
Payer: COMMERCIAL

## 2024-08-08 NOTE — TELEPHONE ENCOUNTER
I received a call from [facility] regarding pt. He presented with red marks on his lower legs. Pt stated he thought they were bug bites. Provider stated it looked like purpura. Good CMS, no fever. INR, PTT, CMP ordered. Will call back with updates. Pt 5 months from AVR with José Miguel Valve on coumadin with a recent INR of 2.7.

## 2024-08-12 ENCOUNTER — HOSPITAL ENCOUNTER (OUTPATIENT)
Dept: CARDIAC REHAB | Facility: CLINIC | Age: 39
Discharge: HOME | End: 2024-08-12
Attending: PHYSICIAN ASSISTANT
Payer: COMMERCIAL

## 2024-08-12 PROCEDURE — 93798 PHYS/QHP OP CAR RHAB W/ECG: CPT

## 2024-08-14 ENCOUNTER — HOSPITAL ENCOUNTER (OUTPATIENT)
Dept: CARDIAC REHAB | Facility: CLINIC | Age: 39
Discharge: HOME | End: 2024-08-14
Attending: PHYSICIAN ASSISTANT
Payer: COMMERCIAL

## 2024-08-14 PROCEDURE — 93798 PHYS/QHP OP CAR RHAB W/ECG: CPT | Performed by: REHABILITATION PRACTITIONER

## 2024-08-15 ENCOUNTER — OFFICE VISIT (OUTPATIENT)
Dept: CARDIOLOGY | Facility: CLINIC | Age: 39
End: 2024-08-15
Payer: COMMERCIAL

## 2024-08-15 VITALS
BODY MASS INDEX: 37.47 KG/M2 | OXYGEN SATURATION: 97 % | HEART RATE: 80 BPM | DIASTOLIC BLOOD PRESSURE: 70 MMHG | HEIGHT: 69 IN | WEIGHT: 253 LBS | SYSTOLIC BLOOD PRESSURE: 104 MMHG

## 2024-08-15 DIAGNOSIS — I48.91 POSTOPERATIVE ATRIAL FIBRILLATION (H): ICD-10-CM

## 2024-08-15 DIAGNOSIS — Z95.2 STATUS POST MECHANICAL AORTIC VALVE REPLACEMENT: Primary | ICD-10-CM

## 2024-08-15 DIAGNOSIS — D64.9 POSTOPERATIVE ANEMIA: ICD-10-CM

## 2024-08-15 DIAGNOSIS — R06.83 SNORING: ICD-10-CM

## 2024-08-15 DIAGNOSIS — I97.89 POSTOPERATIVE ATRIAL FIBRILLATION (H): ICD-10-CM

## 2024-08-15 PROCEDURE — G2211 COMPLEX E/M VISIT ADD ON: HCPCS | Performed by: INTERNAL MEDICINE

## 2024-08-15 PROCEDURE — 99215 OFFICE O/P EST HI 40 MIN: CPT | Mod: 24 | Performed by: INTERNAL MEDICINE

## 2024-08-15 RX ORDER — ASPIRIN 81 MG/1
81 TABLET, CHEWABLE ORAL DAILY
COMMUNITY
Start: 2024-08-15

## 2024-08-15 RX ORDER — METOPROLOL TARTRATE 25 MG/1
25 TABLET, FILM COATED ORAL 2 TIMES DAILY
Qty: 200 TABLET | Refills: 6 | Status: SHIPPED | OUTPATIENT
Start: 2024-08-15

## 2024-08-15 NOTE — PROGRESS NOTES
SERVICE DATE: August 14, 2024      DIAGNOSES/ASSESSMENT:  Status post mechanical aortic valve replacement with 27/29 mm On-X mechanical valve for severe bicuspid aortic valve regurgitation with normal aorta, June 2024.  Successful surgery.  Well-functioning mechanical valve with normal LVEF.  Lifelong warfarin anticoagulation with goal INR of 1.5-2.0.  Brief postoperative atrial fibrillation.  In sinus rhythm today.  Intermittently feels some skipped beats.  Will get a Zio patch.  Obesity with BMI of 38 and interval weight gain.  Consider GLP-1 agonist via PCP.  Clinical suspicion of undiagnosed sleep apnea.  Sleep clinic referral.  On Adderall for ADD.    PLAN:  Per CV surgery team, his goal INR will be 2.0-2.5 until the end of September i.e. 3 months postop and then transition to 1.5-2.0.  Concomitant low-dose aspirin 81 mg per current guidelines.  Reiterated importance of dental hygiene and preprocedural bacterial endocarditis prophylaxis with antibiotics.  Take over-the-counter iron for postoperative anemia.  Sleep clinic referral.  Zio patch heart monitor will be mailed to evaluate for paroxysmal atrial fibrillation.  My nurse will update him with the results.  In 6 months, transthoracic echocardiogram, fasting labs including screening lipid panel, iron studies, CBC, BMP and follow-up with me.  Talk to PCP about GLP-1 agonists such as Ozempic/Wegovy to achieve weight loss.      Mariel Cuevas MD  Cardiology      REASON FOR VISIT:  Follow-up after recent mechanical aortic valve replacement.  Bicuspid aortic valve.    HISTORY OF PRESENT ILLNESS:  Adam Garcia is a delightful 38-year-old male, unaccompanied today, here for follow-up after recent heart surgery for aortic valve replacement.  His wife, Tuesday, is a pharmacist at [employer].  He has bicuspid aortic valve disease, ADD for which he takes Adderall, obesity, no tobacco or alcohol use.  No biological children yet.    Adam was diagnosed  with severe bicuspid aortic valve regurgitation with normal LVEF earlier this year in March 2024.  Normal dimension of aortic root and ascending aorta.  On 6/11/2024, he underwent aortic valve replacement with  27/29 mm ARTIVION On-X mechanical valve by Dr. Nehemiah Kat.  He is on lifelong warfarin anticoagulation with a goal INR of 1.5-2.0 (2-3 for first 3 months postop).  He had brief postoperative atrial fibrillation which spontaneously converted to sinus rhythm and was put on metoprolol titrate 25 mg twice daily.  He was discharged home within 5 days.  Progressing well in cardiac rehab.  Recuperating well without setbacks.    Recent labs show creatinine of 0.69, potassium 4.4, last lab from June showed postoperative anemia with a hemoglobin of 11.6.    Postop echocardiogram shows LVEF of 50-55%, well-seated mechanical aortic prosthesis with a mean gradient of 8 mmHg, no pericardial effusion.      Exam today unremarkable.  /70, pulse 80 bpm and regular.  Sternotomy scar well-healed.  Normal.  Sound, crisp closing click of the second heart sound, no audible murmur.  No pericardial or pleural rub.  Bilateral good air entry in the lungs.  No lower extremity edema.    Established patient.   40 minutes spent by me on the date of the encounter doing chart review, history and exam, documentation and further activities per the note    The longitudinal plan of care for the condition(s) below were addressed during this visit. Due to the added complexity in care, I will continue to support Adam in the subsequent management of this condition(s) and with the ongoing continuity of care of this condition(s).  Postoperative atrial fibrillation, mechanically aortic valve.    This note was completed in part using dictation via the Dragon voice recognition software. Some word and grammatical errors may occur and must be interpreted in the appropriate clinical context. If there are any questions pertaining to this issue,  "please contact me for further clarification.     Orders Placed This Encounter   Procedures    Ferritin    Transferrin    Comprehensive metabolic panel    Lipid Profile    CBC with platelets    Sleep Study (referral)    Follow-Up with Cardiology    Echocardiogram Complete         Vitals: /70 (BP Location: Right arm, Patient Position: Sitting, Cuff Size: Adult Large)   Pulse 80   Ht 1.74 m (5' 8.5\")   Wt 114.8 kg (253 lb)   SpO2 97%   BMI 37.91 kg/m        CURRENT MEDICATIONS:  Current Outpatient Medications   Medication Sig Dispense Refill    acetaminophen (TYLENOL) 500 MG tablet Take 1-2 tablets (500-1,000 mg) by mouth every 8 hours as needed for other (For optimal non-opioid multimodal pain management to improve pain control.) 120 tablet 0    aluminum chloride (DRYSOL) 20 % external solution Apply topically daily as needed (Sweating)      amphetamine-dextroamphetamine (ADDERALL) 30 MG tablet Take 30 mg by mouth 2 times daily      aspirin (ASA) 81 MG chewable tablet Take 1 tablet (81 mg) by mouth daily      cetirizine (ZYRTEC) 5 MG tablet Take 5 mg by mouth every morning      clindamycin (CLEOCIN T) 1 % external solution Apply topically daily as needed (Acne)      fluticasone (FLONASE) 50 MCG/ACT nasal spray Spray 1 spray into both nostrils 2 times daily as needed for allergies      metoprolol tartrate (LOPRESSOR) 25 MG tablet Take 1 tablet (25 mg) by mouth 2 times daily 200 tablet 6    warfarin ANTICOAGULANT (COUMADIN) 5 MG tablet Take 1.5 tablets (7.5 mg) at the same time each evening on 6/16 and 6/17. You should have an INR checked on 6/17. Future warfarin dosing should come from your anticoagulation clinic. Always follow the most recent instructions from your anticoagulation clinic. Do not skip this medication. 45 tablet 0         ALLERGIES:  No Known Allergies          "

## 2024-08-15 NOTE — LETTER
8/15/2024    Bisi Mccollum MD  [facility] [address]  [place] [address]    RE: Adam Garcia       Dear Colleague,     I had the pleasure of seeing Adam Garcia in the [facility].      SERVICE DATE: August 14, 2024      DIAGNOSES/ASSESSMENT:  Status post mechanical aortic valve replacement with 27/29 mm On-X mechanical valve for severe bicuspid aortic valve regurgitation with normal aorta, June 2024.  Successful surgery.  Well-functioning mechanical valve with normal LVEF.  Lifelong warfarin anticoagulation with goal INR of 1.5-2.0.  Brief postoperative atrial fibrillation.  In sinus rhythm today.  Intermittently feels some skipped beats.  Will get a Zio patch.  Obesity with BMI of 38 and interval weight gain.  Consider GLP-1 agonist via PCP.  Clinical suspicion of undiagnosed sleep apnea.  Sleep clinic referral.  On Adderall for ADD.    PLAN:  Per CV surgery team, his goal INR will be 2.0-2.5 until the end of September i.e. 3 months postop and then transition to 1.5-2.0.  Concomitant low-dose aspirin 81 mg per current guidelines.  Reiterated importance of dental hygiene and preprocedural bacterial endocarditis prophylaxis with antibiotics.  Take over-the-counter iron for postoperative anemia.  Sleep clinic referral.  Zio patch heart monitor will be mailed to evaluate for paroxysmal atrial fibrillation.  My nurse will update him with the results.  In 6 months, transthoracic echocardiogram, fasting labs including screening lipid panel, iron studies, CBC, BMP and follow-up with me.  Talk to PCP about GLP-1 agonists such as Ozempic/Wegovy to achieve weight loss.      Mariel Cuevas MD  Cardiology      REASON FOR VISIT:  Follow-up after recent mechanical aortic valve replacement.  Bicuspid aortic valve.    HISTORY OF PRESENT ILLNESS:  Adam Garcia is a delightful 38-year-old male, unaccompanied today, here for follow-up after recent heart surgery for aortic  valve replacement.  His wife, Tuesday, is a pharmacist at [employer].  He has bicuspid aortic valve disease, ADD for which he takes Adderall, obesity, no tobacco or alcohol use.  No biological children yet.    Adam was diagnosed with severe bicuspid aortic valve regurgitation with normal LVEF earlier this year in March 2024.  Normal dimension of aortic root and ascending aorta.  On 6/11/2024, he underwent aortic valve replacement with  27/29 mm ARTIVION On-X mechanical valve by Dr. Nehemiah Kat.  He is on lifelong warfarin anticoagulation with a goal INR of 1.5-2.0 (2-3 for first 3 months postop).  He had brief postoperative atrial fibrillation which spontaneously converted to sinus rhythm and was put on metoprolol titrate 25 mg twice daily.  He was discharged home within 5 days.  Progressing well in cardiac rehab.  Recuperating well without setbacks.    Recent labs show creatinine of 0.69, potassium 4.4, last lab from June showed postoperative anemia with a hemoglobin of 11.6.    Postop echocardiogram shows LVEF of 50-55%, well-seated mechanical aortic prosthesis with a mean gradient of 8 mmHg, no pericardial effusion.      Exam today unremarkable.  /70, pulse 80 bpm and regular.  Sternotomy scar well-healed.  Normal.  Sound, crisp closing click of the second heart sound, no audible murmur.  No pericardial or pleural rub.  Bilateral good air entry in the lungs.  No lower extremity edema.    Established patient.   40 minutes spent by me on the date of the encounter doing chart review, history and exam, documentation and further activities per the note    The longitudinal plan of care for the condition(s) below were addressed during this visit. Due to the added complexity in care, I will continue to support Adam in the subsequent management of this condition(s) and with the ongoing continuity of care of this condition(s).  Postoperative atrial fibrillation, mechanically aortic valve.    This note was  "completed in part using dictation via the Dragon voice recognition software. Some word and grammatical errors may occur and must be interpreted in the appropriate clinical context. If there are any questions pertaining to this issue, please contact me for further clarification.     Orders Placed This Encounter   Procedures     Ferritin     Transferrin     Comprehensive metabolic panel     Lipid Profile     CBC with platelets     Sleep Study (referral)     Follow-Up with Cardiology     Echocardiogram Complete         Vitals: /70 (BP Location: Right arm, Patient Position: Sitting, Cuff Size: Adult Large)   Pulse 80   Ht 1.74 m (5' 8.5\")   Wt 114.8 kg (253 lb)   SpO2 97%   BMI 37.91 kg/m        CURRENT MEDICATIONS:  Current Outpatient Medications   Medication Sig Dispense Refill     acetaminophen (TYLENOL) 500 MG tablet Take 1-2 tablets (500-1,000 mg) by mouth every 8 hours as needed for other (For optimal non-opioid multimodal pain management to improve pain control.) 120 tablet 0     aluminum chloride (DRYSOL) 20 % external solution Apply topically daily as needed (Sweating)       amphetamine-dextroamphetamine (ADDERALL) 30 MG tablet Take 30 mg by mouth 2 times daily       aspirin (ASA) 81 MG chewable tablet Take 1 tablet (81 mg) by mouth daily       cetirizine (ZYRTEC) 5 MG tablet Take 5 mg by mouth every morning       clindamycin (CLEOCIN T) 1 % external solution Apply topically daily as needed (Acne)       fluticasone (FLONASE) 50 MCG/ACT nasal spray Spray 1 spray into both nostrils 2 times daily as needed for allergies       metoprolol tartrate (LOPRESSOR) 25 MG tablet Take 1 tablet (25 mg) by mouth 2 times daily 200 tablet 6     warfarin ANTICOAGULANT (COUMADIN) 5 MG tablet Take 1.5 tablets (7.5 mg) at the same time each evening on 6/16 and 6/17. You should have an INR checked on 6/17. Future warfarin dosing should come from your anticoagulation clinic. Always follow the most recent instructions from " your anticoagulation clinic. Do not skip this medication. 45 tablet 0         ALLERGIES:  No Known Allergies            Thank you for allowing me to participate in the care of your patient.      Sincerely,     Mariel Cuevas MD     [facility]  cc:   No referring provider defined for this encounter.

## 2024-08-21 ENCOUNTER — HOSPITAL ENCOUNTER (OUTPATIENT)
Dept: CARDIAC REHAB | Facility: CLINIC | Age: 39
Discharge: HOME | End: 2024-08-21
Attending: PHYSICIAN ASSISTANT
Payer: COMMERCIAL

## 2024-08-21 PROCEDURE — 93798 PHYS/QHP OP CAR RHAB W/ECG: CPT

## 2024-08-22 ENCOUNTER — HOSPITAL ENCOUNTER (OUTPATIENT)
Dept: CARDIAC REHAB | Facility: CLINIC | Age: 39
Discharge: HOME | End: 2024-08-22
Attending: PHYSICIAN ASSISTANT
Payer: COMMERCIAL

## 2024-08-22 PROCEDURE — 93798 PHYS/QHP OP CAR RHAB W/ECG: CPT

## 2024-08-29 ENCOUNTER — ORDERS ONLY (AUTO-RELEASED) (OUTPATIENT)
Dept: CARDIOLOGY | Facility: CLINIC | Age: 39
End: 2024-08-29
Payer: COMMERCIAL

## 2024-08-29 DIAGNOSIS — I97.89 POSTOPERATIVE ATRIAL FIBRILLATION (H): ICD-10-CM

## 2024-08-29 DIAGNOSIS — I48.91 POSTOPERATIVE ATRIAL FIBRILLATION (H): ICD-10-CM

## 2024-08-29 DIAGNOSIS — Z95.2 STATUS POST MECHANICAL AORTIC VALVE REPLACEMENT: ICD-10-CM

## 2024-09-04 ENCOUNTER — HOSPITAL ENCOUNTER (OUTPATIENT)
Dept: CARDIAC REHAB | Facility: CLINIC | Age: 39
Discharge: HOME | End: 2024-09-04
Attending: PHYSICIAN ASSISTANT
Payer: COMMERCIAL

## 2024-09-04 PROCEDURE — 93798 PHYS/QHP OP CAR RHAB W/ECG: CPT

## 2024-09-10 ENCOUNTER — TELEPHONE (OUTPATIENT)
Dept: CARDIOLOGY | Facility: CLINIC | Age: 39
End: 2024-09-10
Payer: COMMERCIAL

## 2024-09-10 ENCOUNTER — HOSPITAL ENCOUNTER (OUTPATIENT)
Dept: CARDIAC REHAB | Facility: CLINIC | Age: 39
Discharge: HOME | End: 2024-09-10
Attending: PHYSICIAN ASSISTANT
Payer: COMMERCIAL

## 2024-09-10 PROCEDURE — 93244 EXT ECG>48HR<7D REV&INTERPJ: CPT | Performed by: INTERNAL MEDICINE

## 2024-09-10 PROCEDURE — 93798 PHYS/QHP OP CAR RHAB W/ECG: CPT

## 2024-09-10 NOTE — TELEPHONE ENCOUNTER
Sravanthi routed to Dr Cuevas to review, ordered to assess for recurrence of afib, pt is s/p mechanical aortic valve replacement 6/2024 and had brief post-op afib. Follow up is scheduled for February.       Baseline SR 50-169bpm, avg 92bpm  Rare PVC/PACs  No arrhythmias  Symptoms correlated with SR

## 2024-09-11 NOTE — TELEPHONE ENCOUNTER
[employer] message sent to patient with results-    Mariel Cuevas MD Hiljus, Amita MEMBRENO RN  Cc: LEIDY [facility]burn [facility] 2  Caller: Unspecified (Yesterday, 12:41 PM)  Normal result.    SJ

## 2024-09-17 ENCOUNTER — HOSPITAL ENCOUNTER (OUTPATIENT)
Dept: CARDIAC REHAB | Facility: CLINIC | Age: 39
Discharge: HOME | End: 2024-09-17
Attending: PHYSICIAN ASSISTANT
Payer: COMMERCIAL

## 2024-09-17 PROCEDURE — 93798 PHYS/QHP OP CAR RHAB W/ECG: CPT

## 2024-09-19 ENCOUNTER — HOSPITAL ENCOUNTER (OUTPATIENT)
Dept: CARDIAC REHAB | Facility: CLINIC | Age: 39
Discharge: HOME | End: 2024-09-19
Attending: PHYSICIAN ASSISTANT
Payer: COMMERCIAL

## 2024-09-19 PROCEDURE — 93798 PHYS/QHP OP CAR RHAB W/ECG: CPT

## 2024-09-24 ENCOUNTER — HOSPITAL ENCOUNTER (OUTPATIENT)
Dept: CARDIAC REHAB | Facility: CLINIC | Age: 39
Discharge: HOME | End: 2024-09-24
Attending: PHYSICIAN ASSISTANT
Payer: COMMERCIAL

## 2024-09-24 PROCEDURE — 93798 PHYS/QHP OP CAR RHAB W/ECG: CPT

## 2024-09-26 ENCOUNTER — HOSPITAL ENCOUNTER (OUTPATIENT)
Dept: CARDIAC REHAB | Facility: CLINIC | Age: 39
Discharge: HOME | End: 2024-09-26
Attending: PHYSICIAN ASSISTANT
Payer: COMMERCIAL

## 2024-09-26 PROCEDURE — 93798 PHYS/QHP OP CAR RHAB W/ECG: CPT | Performed by: OCCUPATIONAL THERAPIST

## 2024-10-01 ENCOUNTER — HOSPITAL ENCOUNTER (OUTPATIENT)
Dept: CARDIAC REHAB | Facility: CLINIC | Age: 39
Discharge: HOME | End: 2024-10-01
Attending: PHYSICIAN ASSISTANT
Payer: COMMERCIAL

## 2024-10-01 PROCEDURE — 93798 PHYS/QHP OP CAR RHAB W/ECG: CPT | Performed by: REHABILITATION PRACTITIONER

## 2025-02-10 ENCOUNTER — LAB (OUTPATIENT)
Dept: LAB | Facility: CLINIC | Age: 40
End: 2025-02-10
Payer: COMMERCIAL

## 2025-02-10 ENCOUNTER — HOSPITAL ENCOUNTER (OUTPATIENT)
Dept: CARDIOLOGY | Facility: CLINIC | Age: 40
Discharge: HOME | End: 2025-02-10
Attending: INTERNAL MEDICINE | Admitting: INTERNAL MEDICINE
Payer: COMMERCIAL

## 2025-02-10 DIAGNOSIS — R06.83 SNORING: ICD-10-CM

## 2025-02-10 DIAGNOSIS — Z95.2 STATUS POST MECHANICAL AORTIC VALVE REPLACEMENT: ICD-10-CM

## 2025-02-10 DIAGNOSIS — D64.9 POSTOPERATIVE ANEMIA: ICD-10-CM

## 2025-02-10 LAB
ALBUMIN SERPL BCG-MCNC: 4.2 G/DL (ref 3.5–5.2)
ALP SERPL-CCNC: 122 U/L (ref 40–150)
ALT SERPL W P-5'-P-CCNC: 26 U/L (ref 0–70)
ANION GAP SERPL CALCULATED.3IONS-SCNC: 11 MMOL/L (ref 7–15)
AST SERPL W P-5'-P-CCNC: 28 U/L (ref 0–45)
BILIRUB SERPL-MCNC: 0.2 MG/DL
BUN SERPL-MCNC: 11.4 MG/DL (ref 6–20)
CALCIUM SERPL-MCNC: 8.8 MG/DL (ref 8.8–10.4)
CHLORIDE SERPL-SCNC: 106 MMOL/L (ref 98–107)
CHOLEST SERPL-MCNC: 177 MG/DL
CREAT SERPL-MCNC: 0.72 MG/DL (ref 0.67–1.17)
EGFRCR SERPLBLD CKD-EPI 2021: >90 ML/MIN/1.73M2
ERYTHROCYTE [DISTWIDTH] IN BLOOD BY AUTOMATED COUNT: 13.2 % (ref 10–15)
FASTING STATUS PATIENT QL REPORTED: YES
FERRITIN SERPL-MCNC: 129 NG/ML (ref 31–409)
GLUCOSE SERPL-MCNC: 96 MG/DL (ref 70–99)
HCO3 SERPL-SCNC: 22 MMOL/L (ref 22–29)
HCT VFR BLD AUTO: 42 % (ref 40–53)
HDLC SERPL-MCNC: 37 MG/DL
HGB BLD-MCNC: 14.8 G/DL (ref 13.3–17.7)
LDLC SERPL CALC-MCNC: 97 MG/DL
LVEF ECHO: NORMAL
MCH RBC QN AUTO: 29.8 PG (ref 26.5–33)
MCHC RBC AUTO-ENTMCNC: 35.2 G/DL (ref 31.5–36.5)
MCV RBC AUTO: 85 FL (ref 78–100)
NONHDLC SERPL-MCNC: 140 MG/DL
PLATELET # BLD AUTO: 238 10E3/UL (ref 150–450)
POTASSIUM SERPL-SCNC: 4.1 MMOL/L (ref 3.4–5.3)
PROT SERPL-MCNC: 6.7 G/DL (ref 6.4–8.3)
RBC # BLD AUTO: 4.97 10E6/UL (ref 4.4–5.9)
SODIUM SERPL-SCNC: 139 MMOL/L (ref 135–145)
TRANSFERRIN SERPL-MCNC: 226 MG/DL (ref 200–360)
TRIGL SERPL-MCNC: 216 MG/DL
WBC # BLD AUTO: 6.7 10E3/UL (ref 4–11)

## 2025-02-10 PROCEDURE — 85027 COMPLETE CBC AUTOMATED: CPT

## 2025-02-10 PROCEDURE — 82728 ASSAY OF FERRITIN: CPT

## 2025-02-10 PROCEDURE — 93306 TTE W/DOPPLER COMPLETE: CPT | Mod: 26 | Performed by: INTERNAL MEDICINE

## 2025-02-10 PROCEDURE — 80053 COMPREHEN METABOLIC PANEL: CPT

## 2025-02-10 PROCEDURE — 36415 COLL VENOUS BLD VENIPUNCTURE: CPT

## 2025-02-10 PROCEDURE — 93306 TTE W/DOPPLER COMPLETE: CPT

## 2025-02-10 PROCEDURE — 80061 LIPID PANEL: CPT

## 2025-02-10 PROCEDURE — 84466 ASSAY OF TRANSFERRIN: CPT

## 2025-02-13 ENCOUNTER — OFFICE VISIT (OUTPATIENT)
Dept: CARDIOLOGY | Facility: CLINIC | Age: 40
End: 2025-02-13
Attending: INTERNAL MEDICINE
Payer: COMMERCIAL

## 2025-02-13 VITALS
SYSTOLIC BLOOD PRESSURE: 102 MMHG | BODY MASS INDEX: 35.1 KG/M2 | DIASTOLIC BLOOD PRESSURE: 78 MMHG | WEIGHT: 237 LBS | HEIGHT: 69 IN | HEART RATE: 87 BPM | OXYGEN SATURATION: 98 %

## 2025-02-13 DIAGNOSIS — E78.2 MIXED DYSLIPIDEMIA: ICD-10-CM

## 2025-02-13 DIAGNOSIS — Z95.2 STATUS POST MECHANICAL AORTIC VALVE REPLACEMENT: Primary | ICD-10-CM

## 2025-02-13 RX ORDER — VALACYCLOVIR HYDROCHLORIDE 500 MG/1
500 TABLET, FILM COATED ORAL 2 TIMES DAILY PRN
COMMUNITY

## 2025-02-13 RX ORDER — VENLAFAXINE 37.5 MG/1
37.5 TABLET ORAL 2 TIMES DAILY
COMMUNITY
Start: 2024-09-16

## 2025-02-13 RX ORDER — CYCLOBENZAPRINE HCL 5 MG
5 TABLET ORAL 2 TIMES DAILY PRN
COMMUNITY
Start: 2024-12-16

## 2025-02-13 NOTE — PROGRESS NOTES
SERVICE DATE: February 13, 2025      Assessment & Plan    Mechanical Aortic Valve Replacement with 27/29 mm On-X mechanical valve for severe bicuspid aortic valve regurgitation with normal aorta, June 2024.   Well-functioning mechanical aortic valve with no regurgitation and normal mean gradient. Stable on warfarin with therapeutic INR.  -Continue warfarin anticoagulation with goal INR of 2.0.  -Discontinue aspirin to decrease bleeding risk.    Family member screening for bicuspid aortic valve.  Patient has no biological siblings, no biological children.  Parents aware of need for screening echocardiogram.    Postoperative Atrial Fibrillation  No recurrence of atrial fibrillation since postoperative period. Currently on metoprolol tartrate 25mg twice daily.  -Wean off metoprolol over the next month, starting with discontinuing the evening dose for two weeks, then discontinuing the morning dose.    Mixed Dyslipidemia  Total cholesterol 177, HDL 37, LDL 97, triglycerides 216. Not currently on lipid-lowering therapy due to absence of coronary artery disease on preoperative coronary angiogram.  -Encouraged lifestyle modifications including increased physical activity and dietary changes to improve lipid profile.    Obesity  BMI 36, weight 237 pounds.  Reports interval weight gain due to stressful job, working long hours with little time for exercise.  Has insight.  Switching jobs this month and hoping to focus on therapeutic lifestyle modification including regular exercise.  -Encouraged lifestyle modifications including increased physical activity and dietary changes to promote weight loss.    Followup Plans  -Continue regular dental hygiene and preoperative antibiotics for procedures that break into the gums.  -Undergoing sleep study for suspected sleep apnea.  -Return for annual follow-up with fasting labs and echocardiogram.      PLAN:   Wean off metoprolol over the next month, starting with discontinuing the  evening dose for two weeks, then discontinuing the morning dose.  Continue warfarin anticoagulation with goal INR of 2.0.  Discontinue aspirin to decrease bleeding risk.  Encouraged lifestyle modifications including increased physical activity and dietary changes to promote weight loss.  Sleep study scheduled in March for high clinical suspicion of sleep apnea.  Follow-up with me in 1 year with fasting labs and echocardiogram.      Mariel Cuevas MD  Cardiology    History of Present Illness  Adam Garcia is a 39 year old male with a mechanical aortic valve who presents for follow-up.    He underwent aortic valve replacement with a 27-29 mm ONX mechanical valve on June 11, 2024, due to severe aortic valve regurgitation. A recent echocardiogram on February 10, 2025, showed a well-functioning mechanical aortic valve with no regurgitation and a mean gradient of 8-10 mmHg. Left ventricular function is normal at 60%, with normal right ventricle and atrial size, and no mitral or tricuspid valve regurgitation. The aortic root and ascending aorta dimensions are 3.9 cm and 3.7 cm, respectively.    He is on lifelong warfarin anticoagulation with a goal INR of 2.0. His INR has been mostly stable, around 2.2, but recently dropped to 1.8-1.9, with a high reading during a recent illness due to over-the-counter medications. He is methodical about dental hygiene, seeing a dentist every six months, and takes topical clindamycin as needed for acne outbreaks.    He experienced brief postoperative atrial fibrillation, which spontaneously converted to sinus rhythm with no recurrence. He is currently on metoprolol tartrate 25 mg twice daily, which was started for postoperative atrial fibrillation.    He feels generally well, with some coldness in his fingers and toes compared to two to three years ago, but notes improvement since his surgery. Cold extremities are likely secondary to metoprolol use.    He has gained approximately  15 pounds, attributing it partly to starting an antidepressant and a busy work schedule. He acknowledges decreased physical activity and is aware of the need to address his weight gain and metabolic syndrome, as indicated by his lipid panel showing low HDL and elevated triglycerides. He experiences some soreness after golfing but no major issues otherwise.    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 He consumes alcohol, trying to limit it to weekends due to warfarin use. No biological children.    Physical Exam  VITALS: BP- 102/78, P- 87  MEASUREMENTS: WT- 237, BMI- 36  CARDIOVASCULAR: Normal first heart sound, crisp closing click of the second heart sound, no murmur. Clicking of the aortic heart valve audible.  SKIN: Sternal wound well healed with scar hypertrophy, no discharge.    Results  LABS  Total cholesterol: 177  HDL: 37  LDL: 97  Hb: 14.8  PLT: 238  Cr: 0.72  GFR: >90  K: 4.1  Na: 139    RADIOLOGY  CT coronary angiogram: normal coronary arteries without stenosis or plaque, total Agatston calcium score 0 (04/08/2024)    DIAGNOSTIC  Ziopatch heart monitor: sinus rhythm, average heart rate 92 BPM, no atrial or ventricular arrhythmias (09/10/2024)  Echocardiogram: well-functioning mechanical aortic valve, no regurgitation, mean gradient 8-10 mmHg, normal LVF 60%, normal right ventricle, normal atrial size, no mitral or tricuspid valve regurgitation, aortic root 3.9 cm, ascending aorta 3.7 cm (02/10/2025)      Established patient.  42 minutes spent by me on the date of the encounter doing chart review, history and exam, documentation and further activities per the note.    The longitudinal plan of care for the diagnosis(es)/condition(s) as documented were addressed during this visit. Due to the added complexity in care, I will continue to support Adam in the subsequent management and with ongoing continuity of care.  Mechanical aortic valve  "replacement, mixed dyslipidemia.    This note was completed in part using dictation via voice recognition software. Some word and grammatical errors may occur and must be interpreted in the appropriate clinical context. If there are any questions pertaining to this issue, please contact me for further clarification.     Orders Placed This Encounter   Procedures    Comprehensive metabolic panel    Lipid Profile    Follow-Up with Cardiology    Echocardiogram Complete    CBC with platelets differential         Vitals: /78 (BP Location: Right arm, Patient Position: Sitting, Cuff Size: Adult Large)   Pulse 87   Ht 1.74 m (5' 8.5\")   Wt 107.5 kg (237 lb)   SpO2 98%   BMI 35.51 kg/m        CURRENT MEDICATIONS:  Current Outpatient Medications   Medication Sig Dispense Refill    acetaminophen (TYLENOL) 500 MG tablet Take 1-2 tablets (500-1,000 mg) by mouth every 8 hours as needed for other (For optimal non-opioid multimodal pain management to improve pain control.) 120 tablet 0    aluminum chloride (DRYSOL) 20 % external solution Apply topically daily as needed (Sweating)      amphetamine-dextroamphetamine (ADDERALL) 30 MG tablet Take 30 mg by mouth 2 times daily      cetirizine (ZYRTEC) 5 MG tablet Take 5 mg by mouth every morning      clindamycin (CLEOCIN T) 1 % external solution Apply topically daily as needed (Acne)      cyclobenzaprine (FLEXERIL) 5 MG tablet Take 5 mg by mouth 2 times daily as needed.      fluticasone (FLONASE) 50 MCG/ACT nasal spray Spray 1 spray into both nostrils 2 times daily as needed for allergies      valACYclovir (VALTREX) 500 MG tablet Take 500 mg by mouth 2 times daily as needed.      venlafaxine (EFFEXOR) 37.5 MG tablet 37.5 mg 2 times daily.      warfarin ANTICOAGULANT (COUMADIN) 5 MG tablet Take 1.5 tablets (7.5 mg) at the same time each evening on 6/16 and 6/17. You should have an INR checked on 6/17. Future warfarin dosing should come from your anticoagulation clinic. Always " follow the most recent instructions from your anticoagulation clinic. Do not skip this medication. 45 tablet 0         ALLERGIES:  No Known Allergies

## 2025-02-13 NOTE — LETTER
2/13/2025    Bisi Mccollum MD  [facility] [address]  [facility] [address]    RE: Adam Garcia       Dear Colleague,     I had the pleasure of seeing Adam Garcia in the [facility].    SERVICE DATE: February 13, 2025      Assessment & Plan    Mechanical Aortic Valve Replacement with 27/29 mm On-X mechanical valve for severe bicuspid aortic valve regurgitation with normal aorta, June 2024.   Well-functioning mechanical aortic valve with no regurgitation and normal mean gradient. Stable on warfarin with therapeutic INR.  -Continue warfarin anticoagulation with goal INR of 2.0.  -Discontinue aspirin to decrease bleeding risk.    Family member screening for bicuspid aortic valve.  Patient has no biological siblings, no biological children.  Parents aware of need for screening echocardiogram.    Postoperative Atrial Fibrillation  No recurrence of atrial fibrillation since postoperative period. Currently on metoprolol tartrate 25mg twice daily.  -Wean off metoprolol over the next month, starting with discontinuing the evening dose for two weeks, then discontinuing the morning dose.    Mixed Dyslipidemia  Total cholesterol 177, HDL 37, LDL 97, triglycerides 216. Not currently on lipid-lowering therapy due to absence of coronary artery disease on preoperative coronary angiogram.  -Encouraged lifestyle modifications including increased physical activity and dietary changes to improve lipid profile.    Obesity  BMI 36, weight 237 pounds.  Reports interval weight gain due to stressful job, working long hours with little time for exercise.  Has insight.  Switching jobs this month and hoping to focus on therapeutic lifestyle modification including regular exercise.  -Encouraged lifestyle modifications including increased physical activity and dietary changes to promote weight loss.    Followup Plans  -Continue regular dental hygiene and preoperative antibiotics for procedures that  break into the gums.  -Undergoing sleep study for suspected sleep apnea.  -Return for annual follow-up with fasting labs and echocardiogram.      PLAN:   Wean off metoprolol over the next month, starting with discontinuing the evening dose for two weeks, then discontinuing the morning dose.  Continue warfarin anticoagulation with goal INR of 2.0.  Discontinue aspirin to decrease bleeding risk.  Encouraged lifestyle modifications including increased physical activity and dietary changes to promote weight loss.  Sleep study scheduled in March for high clinical suspicion of sleep apnea.  Follow-up with me in 1 year with fasting labs and echocardiogram.      Mariel Cuevas MD  Cardiology    History of Present Illness  Adam Garcia is a 39 year old male with a mechanical aortic valve who presents for follow-up.    He underwent aortic valve replacement with a 27-29 mm ONX mechanical valve on June 11, 2024, due to severe aortic valve regurgitation. A recent echocardiogram on February 10, 2025, showed a well-functioning mechanical aortic valve with no regurgitation and a mean gradient of 8-10 mmHg. Left ventricular function is normal at 60%, with normal right ventricle and atrial size, and no mitral or tricuspid valve regurgitation. The aortic root and ascending aorta dimensions are 3.9 cm and 3.7 cm, respectively.    He is on lifelong warfarin anticoagulation with a goal INR of 2.0. His INR has been mostly stable, around 2.2, but recently dropped to 1.8-1.9, with a high reading during a recent illness due to over-the-counter medications. He is methodical about dental hygiene, seeing a dentist every six months, and takes topical clindamycin as needed for acne outbreaks.    He experienced brief postoperative atrial fibrillation, which spontaneously converted to sinus rhythm with no recurrence. He is currently on metoprolol tartrate 25 mg twice daily, which was started for postoperative atrial fibrillation.    He  feels generally well, with some coldness in his fingers and toes compared to two to three years ago, but notes improvement since his surgery. Cold extremities are likely secondary to metoprolol use.    He has gained approximately 15 pounds, attributing it partly to starting an antidepressant and a busy work schedule. He acknowledges decreased physical activity and is aware of the need to address his weight gain and metabolic syndrome, as indicated by his lipid panel showing low HDL and elevated triglycerides. He experiences some soreness after golfing but no major issues otherwise.    He works in [employer] and is transitioning to a new job, which he anticipates will be less stressful. He consumes alcohol, trying to limit it to weekends due to warfarin use. No biological children.    Physical Exam  VITALS: BP- 102/78, P- 87  MEASUREMENTS: WT- 237, BMI- 36  CARDIOVASCULAR: Normal first heart sound, crisp closing click of the second heart sound, no murmur. Clicking of the aortic heart valve audible.  SKIN: Sternal wound well healed with scar hypertrophy, no discharge.    Results  LABS  Total cholesterol: 177  HDL: 37  LDL: 97  Hb: 14.8  PLT: 238  Cr: 0.72  GFR: >90  K: 4.1  Na: 139    RADIOLOGY  CT coronary angiogram: normal coronary arteries without stenosis or plaque, total Agatston calcium score 0 (04/08/2024)    DIAGNOSTIC  Ziopatch heart monitor: sinus rhythm, average heart rate 92 BPM, no atrial or ventricular arrhythmias (09/10/2024)  Echocardiogram: well-functioning mechanical aortic valve, no regurgitation, mean gradient 8-10 mmHg, normal LVF 60%, normal right ventricle, normal atrial size, no mitral or tricuspid valve regurgitation, aortic root 3.9 cm, ascending aorta 3.7 cm (02/10/2025)      Established patient.  42 minutes spent by me on the date of the encounter doing chart review, history and exam, documentation and further activities per the note.    The longitudinal plan of care for the  "diagnosis(es)/condition(s) as documented were addressed during this visit. Due to the added complexity in care, I will continue to support Adam in the subsequent management and with ongoing continuity of care.  Mechanical aortic valve replacement, mixed dyslipidemia.    This note was completed in part using dictation via voice recognition software. Some word and grammatical errors may occur and must be interpreted in the appropriate clinical context. If there are any questions pertaining to this issue, please contact me for further clarification.     Orders Placed This Encounter   Procedures     Comprehensive metabolic panel     Lipid Profile     Follow-Up with Cardiology     Echocardiogram Complete     CBC with platelets differential         Vitals: /78 (BP Location: Right arm, Patient Position: Sitting, Cuff Size: Adult Large)   Pulse 87   Ht 1.74 m (5' 8.5\")   Wt 107.5 kg (237 lb)   SpO2 98%   BMI 35.51 kg/m        CURRENT MEDICATIONS:  Current Outpatient Medications   Medication Sig Dispense Refill     acetaminophen (TYLENOL) 500 MG tablet Take 1-2 tablets (500-1,000 mg) by mouth every 8 hours as needed for other (For optimal non-opioid multimodal pain management to improve pain control.) 120 tablet 0     aluminum chloride (DRYSOL) 20 % external solution Apply topically daily as needed (Sweating)       amphetamine-dextroamphetamine (ADDERALL) 30 MG tablet Take 30 mg by mouth 2 times daily       cetirizine (ZYRTEC) 5 MG tablet Take 5 mg by mouth every morning       clindamycin (CLEOCIN T) 1 % external solution Apply topically daily as needed (Acne)       cyclobenzaprine (FLEXERIL) 5 MG tablet Take 5 mg by mouth 2 times daily as needed.       fluticasone (FLONASE) 50 MCG/ACT nasal spray Spray 1 spray into both nostrils 2 times daily as needed for allergies       valACYclovir (VALTREX) 500 MG tablet Take 500 mg by mouth 2 times daily as needed.       venlafaxine (EFFEXOR) 37.5 MG tablet 37.5 mg 2 times " daily.       warfarin ANTICOAGULANT (COUMADIN) 5 MG tablet Take 1.5 tablets (7.5 mg) at the same time each evening on 6/16 and 6/17. You should have an INR checked on 6/17. Future warfarin dosing should come from your anticoagulation clinic. Always follow the most recent instructions from your anticoagulation clinic. Do not skip this medication. 45 tablet 0         ALLERGIES:  No Known Allergies        Thank you for allowing me to participate in the care of your patient.      Sincerely,     Mariel Cuevas MD     [facility]  cc:   Mariel Cuevas MD  [address] [place] [id number]

## 2025-03-09 PROBLEM — Q23.1 AORTIC REGURGITATION DUE TO BICUSPID AORTIC VALVE: Status: ACTIVE | Noted: 2024-03-15

## 2025-03-09 PROBLEM — F90.9 ADHD (ATTENTION DEFICIT HYPERACTIVITY DISORDER): Chronic | Status: ACTIVE | Noted: 2025-03-09

## 2025-03-09 PROBLEM — Q23.81 AORTIC REGURGITATION DUE TO BICUSPID AORTIC VALVE: Status: ACTIVE | Noted: 2024-03-15

## 2025-03-09 ASSESSMENT — SLEEP AND FATIGUE QUESTIONNAIRES
HOW LIKELY ARE YOU TO NOD OFF OR FALL ASLEEP WHILE SITTING INACTIVE IN A PUBLIC PLACE: WOULD NEVER DOZE
HOW LIKELY ARE YOU TO NOD OFF OR FALL ASLEEP WHEN YOU ARE A PASSENGER IN A CAR FOR AN HOUR WITHOUT A BREAK: MODERATE CHANCE OF DOZING
HOW LIKELY ARE YOU TO NOD OFF OR FALL ASLEEP WHILE SITTING AND READING: SLIGHT CHANCE OF DOZING
HOW LIKELY ARE YOU TO NOD OFF OR FALL ASLEEP WHILE LYING DOWN TO REST IN THE AFTERNOON WHEN CIRCUMSTANCES PERMIT: MODERATE CHANCE OF DOZING
HOW LIKELY ARE YOU TO NOD OFF OR FALL ASLEEP IN A CAR, WHILE STOPPED FOR A FEW MINUTES IN TRAFFIC: WOULD NEVER DOZE
HOW LIKELY ARE YOU TO NOD OFF OR FALL ASLEEP WHILE SITTING QUIETLY AFTER LUNCH WITHOUT ALCOHOL: SLIGHT CHANCE OF DOZING
HOW LIKELY ARE YOU TO NOD OFF OR FALL ASLEEP WHILE WATCHING TV: SLIGHT CHANCE OF DOZING
HOW LIKELY ARE YOU TO NOD OFF OR FALL ASLEEP WHILE SITTING AND TALKING TO SOMEONE: WOULD NEVER DOZE

## 2025-03-10 ENCOUNTER — VIRTUAL VISIT (OUTPATIENT)
Dept: SLEEP MEDICINE | Facility: CLINIC | Age: 40
End: 2025-03-10
Payer: COMMERCIAL

## 2025-03-10 VITALS — BODY MASS INDEX: 40.01 KG/M2 | WEIGHT: 264 LBS | HEIGHT: 68 IN

## 2025-03-10 DIAGNOSIS — R06.83 SNORING: Primary | ICD-10-CM

## 2025-03-10 DIAGNOSIS — F51.04 CHRONIC INSOMNIA: ICD-10-CM

## 2025-03-10 DIAGNOSIS — G47.30 OBSERVED SLEEP APNEA: ICD-10-CM

## 2025-03-10 PROCEDURE — 98001 SYNCH AUDIO-VIDEO NEW LOW 30: CPT | Performed by: PHYSICIAN ASSISTANT

## 2025-03-10 PROCEDURE — 1126F AMNT PAIN NOTED NONE PRSNT: CPT | Performed by: PHYSICIAN ASSISTANT

## 2025-03-10 ASSESSMENT — PAIN SCALES - GENERAL: PAINLEVEL_OUTOF10: NO PAIN (0)

## 2025-03-10 NOTE — PROGRESS NOTES
Virtual Visit Details    Type of service:  Video Visit   Start Time: 12:30 PM   End Time: 12:59 PM    Originating Location (pt. Location): Home    Distant Location (provider location):  Off-site  Platform used for Video Visit: [facility]    Outpatient Sleep Medicine Consultation:      Name: Adam Garcia MRN# [id number]   Age: 39 year old YOB: 1985     Date of Consultation: March 9, 2025  Consultation is requested by: Mariel Cuevas MD  [address] [place] [id number] Mariel Cuevas  Primary care provider: Bisi Mccollum       Reason for Sleep Consult:     Adam Garcia is sent by Mariel Cuevas for a sleep consultation regarding snoring, S/P AVR.    Patient s Reason for visit  Adam Reynoldsblessing main reason for visit: (Patient-Rptd) Apnea  Patient states problem(s) started: (Patient-Rptd) Persistent over multiple years  Adam PORSHA Olenabernadette's goals for this visit: (Patient-Rptd) Understand options to address apnea           Assessment and Plan:     Summary Sleep Diagnoses and Recommendations:  (R06.83) Snoring  (primary encounter diagnosis), (G47.30) Observed sleep apnea, (Z68.41) BMI 40.0-44.9, adult (H)  Comment: Adam presents with a number of sleep concerns which have been present for most of his life.  He snores and is observed to have choking sounds in his sleep.  He has a very strong family history of EVANGELINA and just about every male on his father side.  He feels tired and has low concentration, but is currently on 30 mg Adderall twice daily which may limit inadvertent dozing.  His ESS was normal at 7/24.  STOP BANG TOTAL: 5 snoring, tired, observed apnea, BMI >35 (40), male gender.  He does not have HTN.  He also has a number of parasomnia behaviors including sleep talking, bruxism and confusional arousals.  These could be a function of apnea, stress, or other causes of disrupted sleep.  Plan: HST-Home Sleep Apnea Test - Noxturnal Returnable              (F51.04)  Chronic insomnia  Comment: Adam has had difficulty falling asleep and staying asleep for most of his life.  Currently, he attributes most of his difficulty with sleep on work stress.  He does have an excessive sleep opportunity, in bed 9 hours or more on weekends and sometimes weekdays as well.  Plan: We talked briefly about compressing time in bed to help consolidate sleep if he does not have apnea, but we would also want to find ways to help him manage work stress.           Comorbid Diagnoses:  Patient Active Problem List   Diagnosis    S/P AVR (aortic valve replacement)    ADHD (attention deficit hyperactivity disorder)    Aortic regurgitation due to bicuspid aortic valve        Summary Counseling:    Sleep Testing Reviewed  Obstructive Sleep Apnea Reviewed  Complications of Untreated Sleep Apnea Reviewed      Patient will follow up 3 months after sleep study. We will review the study results over MyChart and initiate treatment before the follow up if indicated.  Bennett Goltz, PA-C      Total time spent reviewing medical records, history and physical examination, review of previous testing and interpretation as well as documentation on this date:43 min    CC: Mariel Cuevas          History of Present Illness:     Adam Garcia presents with concerns of sleep apnea. He is tired often and has low concentration. He has difficulty falling asleep and staying asleep. He grinds his teeth and talks in his sleep. He has had issues sleeping his entire life. He has snored and had difficulty sleeping even before gaining weight. His wife observes choking sounds in his sleep. It has been disruptive of his wife's sleep lately, though. He has concerns about how it may effect his heart.     Past Sleep Evaluations: None     SLEEP-WAKE SCHEDULE:     Work/School Days: Patient goes to school/work: (Patient-Rptd) Yes   Usually gets into bed at (Patient-Rptd) 10:30-11  Takes patient about (Patient-Rptd) 35-40 minutes to  fall asleep  Has trouble falling asleep (Patient-Rptd) 4-5 nights per week  Wakes up in the middle of the night (Patient-Rptd) 2-3 times.  Wakes up due to (Patient-Rptd) Snorting self awake;External stimuli (bed partner, pets, noise, etc);Use the bathroom;Anxiety;Nightmares;Other. He wakes sometimes thinking about work. RRx1-2/week  He has trouble falling back asleep (Patient-Rptd) 2-3 times a week.   It usually takes (Patient-Rptd) 20-35 minutes to get back to sleep  Patient is usually up at (Patient-Rptd) 7:15-8. He is trying to get up earlier, but if he has prolonged awakenings, he may sleep in.  Uses alarm: (Patient-Rptd) Yes    Weekends/Non-work Days/All Other Days:  Usually gets into bed at (Patient-Rptd) 11-12:30   Takes patient about (Patient-Rptd) 35-40 to fall asleep  Patient is usually up at (Patient-Rptd) 9:30-10  Uses alarm: (Patient-Rptd) Yes  Sometimes he is so exhausted on weekends, he may sleep hard, but may still wake thinking about work or other things.     Sleep Need  Patient gets  (Patient-Rptd) 5 hrs sleep on average   Patient thinks he needs about (Patient-Rptd) 7 hrs sleep    Adam Garcia prefers to sleep in this position(s): (Patient-Rptd) Side;Stomach;Head Elevated   Patient states they do the following activities in bed: (Patient-Rptd) Read 30-40 min, will go to the couch if he wants to read longer. He reads on a Paperwhite Senait. No TV in bed.    Naps  Patient takes a purposeful nap (Patient-Rptd) 2 times a week and naps are usually (Patient-Rptd) 20-40 minutes in duration, weekends mostly  He feels better after a nap: (Patient-Rptd) Yes  He dozes off unintentionally (Patient-Rptd) 0 days per week  Patient has had a driving accident or near-miss due to sleepiness/drowsiness: (Patient-Rptd) No      SLEEP DISRUPTIONS:    Breathing/Snoring  Patient snores:(Patient-Rptd) Yes  Other people complain about his snoring: (Patient-Rptd) Yes  Patient has been told he stops breathing in his  sleep:(Patient-Rptd) Yes  He has issues with the following: (Patient-Rptd) Morning headaches;Morning mouth dryness;Stuffy nose when you wake up;Heartburn or reflux at night. Morning headaches: couple times per week. Nocturnal heartburn or reflux: sporadic. Nasal congestion at night: yes. Wakes with sore throat.    Movement:  Patient gets pain, discomfort, with an urge to move:  (Patient-Rptd) Yes restless legs symptoms. Always fidgeting and twitchy, which he associates with ADHD. He denies a building antsy feeling causing an urge to move.  It happens when he is resting:  (Patient-Rptd) Yes  It happens more at night:  (Patient-Rptd) Yes  Patient has been told he kicks his legs at night:  (Patient-Rptd) Yes separate from breathing events     Behaviors in Sleep:  Adam Garcia has experienced the following behaviors while sleeping: (Patient-Rptd) Recurring Nightmares;Sleep-talking;Teeth grinding;Kicking or punching;Night terrors (screaming,yelling or acting afraid but not recalling event);See or hear things that are not really there upon awakening or just falling asleep. Once or twice recently he punched, can't remember a dream with those events. Sleep talking is relatively recent.  Sometimes wakes feeling a little confused about where he is. Not hypnagogue so much. Used to have a lot of nightmares, night terrors when young. Nightmares are infrequent now.  Pt denies sleep walking and dream enactment behavior. Pt denies sleep paralysis and cataplexy. Has been on venlafaxine since last September.      Is there anything else you would like your sleep provider to know:        CAFFEINE AND OTHER SUBSTANCES:    Patient consumes caffeinated beverages per day:  (Patient-Rptd) 2 coffee  Last caffeine use is usually: (Patient-Rptd) 1:30-2 pm  List of any prescribed or over the counter stimulants that patient takes: (Patient-Rptd) Adderall 30 mg bid  List of any prescribed or over the counter sleep medication patient takes:  (Patient-Rptd) Adderall  List of previous sleep medications that patient has tried: (Patient-Rptd) ZQuill, Melatonin  Patient drinks alcohol to help them sleep: (Patient-Rptd) No  Patient drinks alcohol near bedtime: (Patient-Rptd) No    Family History:  Patient has a family member been diagnosed with a sleep disorder: (Patient-Rptd) Yes  (Patient-Rptd) Apnea, teeth grinding, etc. Father, uncle, grandfather have apnea (all males on paternal side)     Social History:  He works as a . He just changed jobs 2 weeks ago. He works for [employer]. He was working for [employer] in a similar role.     SCALES:    EPWORTH SLEEPINESS SCALE         3/9/2025     7:14 PM    [place] Sleepiness Scale ( NATASHA Tapia  3342-3036<br>ESS - [place]/English - Final version - 21 Nov 07 - [facility] [place].)   Sitting and reading Slight chance of dozing   Watching TV Slight chance of dozing   Sitting, inactive in a public place (e.g. a theatre or a meeting) Would never doze   As a passenger in a car for an hour without a break Moderate chance of dozing   Lying down to rest in the afternoon when circumstances permit Moderate chance of dozing   Sitting and talking to someone Would never doze   Sitting quietly after a lunch without alcohol Slight chance of dozing   In a car, while stopped for a few minutes in traffic Would never doze   [place] Score (MC) 7   [place] Score (Sleep) 7        Patient-reported         INSOMNIA SEVERITY INDEX (NEY)          3/9/2025     7:05 PM   Insomnia Severity Index (NEY)   Difficulty falling asleep 3   Difficulty staying asleep 2   Problems waking up too early 2   How SATISFIED/DISSATISFIED are you with your CURRENT sleep pattern? 3   How NOTICEABLE to others do you think your sleep problem is in terms of impairing the quality of your life? 3   How WORRIED/DISTRESSED are you about your current sleep problem? 2   To what extent do you consider your sleep problem to INTERFERE with your daily  "functioning (e.g. daytime fatigue, mood, ability to function at work/daily chores, concentration, memory, mood, etc.) CURRENTLY? 2   NEY Total Score 17        Patient-reported       Guidelines for Scoring/Interpretation:  Total score categories:  0-7 = No clinically significant insomnia   8-14 = Subthreshold insomnia   15-21 = Clinical insomnia (moderate severity)  22-28 = Clinical insomnia (severe)  Used via courtesy of www.Matchupealth.va.gov with permission from Gaurav Muse PhD., [facility]      STOP BANG         3/9/2025     7:15 PM   STOP BANG Questionnaire (  2008, the American Society of Anesthesiologists, Inc. [employer]   1. Snoring - Do you snore loudly (louder than talking or loud enough to be heard through closed doors)? Yes   2. Tired - Do you often feel tired, fatigued, or sleepy during daytime? Yes   3. Observed - Has anyone observed you stop breathing during your sleep? Yes   4. Blood pressure - Do you have or are you being treated for high blood pressure? No   5. BMI - BMI more than 35 kg/m2? Yes   6. Age - Age over 50 yr old? No   7. Neck circumference - Neck circumference greater than 40 cm? Yes   8. Gender - Gender male? Yes   STOP BANG Score (MC): 6 (High risk of EVANGELINA)         GAD7         No data to display                  CAGE-AID         No data to display                CAGE-AID reprinted with permission from the [place] Medical Journal, DAVONTE Burgos. and DRAGAN Suarez, \"Conjoint screening questionnaires for alcohol and drug abuse\" [place] Medical Journal 94: 135-140, 1995.      PATIENT HEALTH QUESTIONNAIRE-9 (PHQ - 9)         No data to display                Developed by Bashir Goldstein, Pham Nava, Ney Yang and colleagues, with an educational mihkail from [employer]. No permission required to reproduce, translate, display or distribute.        Allergies:    No Known Allergies    Medications:    Current Outpatient Medications   Medication " Sig Dispense Refill    acetaminophen (TYLENOL) 500 MG tablet Take 1-2 tablets (500-1,000 mg) by mouth every 8 hours as needed for other (For optimal non-opioid multimodal pain management to improve pain control.) 120 tablet 0    aluminum chloride (DRYSOL) 20 % external solution Apply topically daily as needed (Sweating)      amphetamine-dextroamphetamine (ADDERALL) 30 MG tablet Take 30 mg by mouth 2 times daily      cetirizine (ZYRTEC) 5 MG tablet Take 5 mg by mouth every morning      cyclobenzaprine (FLEXERIL) 5 MG tablet Take 5 mg by mouth 2 times daily as needed.      fluticasone (FLONASE) 50 MCG/ACT nasal spray Spray 1 spray into both nostrils 2 times daily as needed for allergies      valACYclovir (VALTREX) 500 MG tablet Take 500 mg by mouth 2 times daily as needed.      venlafaxine (EFFEXOR) 37.5 MG tablet 37.5 mg 2 times daily.      warfarin ANTICOAGULANT (COUMADIN) 5 MG tablet Take 1.5 tablets (7.5 mg) at the same time each evening on 6/16 and 6/17. You should have an INR checked on 6/17. Future warfarin dosing should come from your anticoagulation clinic. Always follow the most recent instructions from your anticoagulation clinic. Do not skip this medication. 45 tablet 0       Problem List:  Patient Active Problem List    Diagnosis Date Noted    ADHD (attention deficit hyperactivity disorder) 03/09/2025     Priority: Medium    S/P AVR (aortic valve replacement) 06/11/2024     Priority: Medium    Aortic regurgitation due to bicuspid aortic valve 03/15/2024     Priority: Medium     Diagnosed by Dr. Carmona on 3/13/2024, confirmed with Cardiology appointment with Dr. Cuevas at [facility], 3/15/2024          Past Medical/Surgical History:  Past Medical History:   Diagnosis Date    ADHD (attention deficit hyperactivity disorder)     Obesity      Past Surgical History:   Procedure Laterality Date    NO HISTORY OF SURGERY      REPAIR VALVE AORTIC N/A 6/11/2024    Procedure: Median Sternotmy,  AORTIC VALVE REPLACMENT with 27/29 On-X Prosthetic Heart Valve with Anatomical Sewing Ring, On Cardiopulmonary Bypass, ECHOCARDIOGRAM, TRANSESOPHAGEAL, WITH ANESTHESIA;  Surgeon: Nehemiah Kat MD;  Location:  OR       Social History:  Social History     Socioeconomic History    Marital status:      Spouse name: Not on file    Number of children: Not on file    Years of education: Not on file    Highest education level: Not on file   Occupational History    Not on file   Tobacco Use    Smoking status: Former     Types: Cigarettes    Smokeless tobacco: Never   Substance and Sexual Activity    Alcohol use: Yes     Comment: 3-4 drinks week    Drug use: Yes     Types: Marijuana     Comment: will take THC edible 1-2x month    Sexual activity: Not on file   Other Topics Concern    Not on file   Social History Narrative    Not on file     Social Drivers of Health     Financial Resource Strain: Not on file   Food Insecurity: Not on file   Transportation Needs: Not on file   Physical Activity: Not on file   Stress: Not on file   Social Connections: Not on file   Interpersonal Safety: Not on file   Housing Stability: Not on file       Family History:  Family History   Problem Relation Age of Onset    Valvular heart disease No family hx of        Review of Systems:  A complete review of systems reviewed by me is negative with the exeption of what has been mentioned in the history of present illness.  In the last TWO WEEKS have you experienced any of the following symptoms?  Fevers: (Patient-Rptd) No  Night Sweats: (Patient-Rptd) Yes  Weight Gain: (Patient-Rptd) Yes  Pain at Night: (Patient-Rptd) No  Double Vision: (Patient-Rptd) No  Changes in Vision: (Patient-Rptd) No  Difficulty Breathing through Nose: (Patient-Rptd) Yes  Sore Throat in Morning: (Patient-Rptd) Yes  Dry Mouth in the Morning: (Patient-Rptd) Yes  Shortness of Breath Lying Flat: (Patient-Rptd) No  Shortness of Breath With Activity:  "(Patient-Rptd) No  Awakening with Shortness of Breath: (Patient-Rptd) Yes  Increased Cough: (Patient-Rptd) No  Heart Racing at Night: (Patient-Rptd) Yes  Swelling in Feet or Legs: (Patient-Rptd) No  Diarrhea at Night: (Patient-Rptd) No  Urinating More than Once at Night: (Patient-Rptd) Yes  Losing Control of Urine at Night: (Patient-Rptd) No  Joint Pains at Night: (Patient-Rptd) Yes  Headaches in Morning: (Patient-Rptd) Yes  Weakness in Arms or Legs: (Patient-Rptd) No  Depressed Mood: (Patient-Rptd) Yes  Anxiety: (Patient-Rptd) Yes     Physical Examination:  Vitals: Ht 1.727 m (5' 8\")   Wt 119.7 kg (264 lb)   BMI 40.14 kg/m             GENERAL APPEARANCE: healthy, alert, no distress, and cooperative  EYES: Eyes grossly normal to inspection and wearing glasses   HENT: oral mucous membranes moist and oropharynx clear  NECK: no asymmetry, masses, or scars  RESP: no apparent respiratory distress (retractions or difficulty speaking in full sentences), no audible wheeze or cough   Mallampati Class: I.  Tonsillar Stage: 1  hidden by pillars.         Data: All pertinent previous laboratory data reviewed     Recent Labs   Lab Test 02/10/25  1027 06/16/24  0906    134*   POTASSIUM 4.1 4.4   CHLORIDE 106 102   CO2 22 23   ANIONGAP 11 9   GLC 96 97   BUN 11.4 8.1   CR 0.72 0.69   MARKUS 8.8 8.9       Recent Labs   Lab Test 02/10/25  1027   WBC 6.7   RBC 4.97   HGB 14.8   HCT 42.0   MCV 85   MCH 29.8   MCHC 35.2   RDW 13.2          Recent Labs   Lab Test 02/10/25  1027   PROTTOTAL 6.7   ALBUMIN 4.2   BILITOTAL 0.2   ALKPHOS 122   AST 28   ALT 26       No results found for: \"TSH\"    No results found for: \"UAMP\", \"UBARB\", \"BENZODIAZEUR\", \"UCANN\", \"UCOC\", \"OPIT\", \"UPCP\"    Ferritin   Date/Time Value Ref Range Status   02/10/2025 10:27  31 - 409 ng/mL Final       pH Arterial (no units)   Date Value   06/11/2024 7.36   06/11/2024 7.33 (L)     pH Arterial POCT (no units)   Date Value   06/11/2024 7.40   06/11/2024 " 7.36     pO2 Arterial (mm Hg)   Date Value   06/11/2024 112 (H)   06/11/2024 150 (H)     pO2 Arterial POCT (mm Hg)   Date Value   06/11/2024 296 (H)   06/11/2024 161 (H)     pCO2 Arterial (mm Hg)   Date Value   06/11/2024 49 (H)   06/11/2024 45     pCO2 Arterial POCT (mm Hg)   Date Value   06/11/2024 41   06/11/2024 44     Bicarbonate Arterial (mmol/L)   Date Value   06/11/2024 27   06/11/2024 23     Bicarbonate Arterial POCT (mmol/L)   Date Value   06/11/2024 25   06/11/2024 25     Base Excess/Deficit Arterial (mmol/L)   Date Value   06/11/2024 1.1   06/11/2024 -2.9     Base Excess/Deficit (+/-) POCT (mmol/L)   Date Value   06/11/2024 0.2   06/11/2024 -0.7       @LABRCNTIPR(phv:4,pco2v:4,po2v:4,hco3v:4,cristi:4,o2per:4)@    Echocardiology: No results found for this or any previous visit (from the past 4320 hours).    Chest x-ray:   XR Chest 2 Views 06/14/2024    Narrative  EXAM: XR CHEST 2 VIEWS 6/14/2024 8:56 AM    INDICATION: Chest tube removal    COMPARISON: Chest radiograph 6/12/2024, CT 4/8/2024    TECHNIQUE: Single upright AP view of the chest.    FINDINGS:  Stable postsurgical changes of aortic valve replacement with midline  sternotomy wires intact. Interval removal of right IJ central venous  catheter and mediastinal drains. Stable position of artivion aortic  valve. No evidence of pneumothorax, focal consolidation or pleural  effusion. Stable cardiomegaly. Trachea is midline. No abnormal  calcifications. Upper abdomen within normal limits.    Impression  IMPRESSION:  Stable postsurgical changes of valve replacement with interval removal  of right IJ central venous catheter and mediastinal drains. No  evidence of pneumothorax.    I have personally reviewed the examination and initial interpretation  and I agree with the findings.    CHINTAN ORTIZ MD      SYSTEM ID:  [id number]      Chest CT: No results found for this or any previous visit from the past 365 days.      PFT: Most Recent Breeze Pulmonary Function  "Testing    No results found for: \"20001\"        Bennett Ezra Goltz, PA-C, NAVA 3/9/2025          "

## 2025-03-10 NOTE — NURSING NOTE
Current patient location: [facility] [place] [address]    Is the patient currently in the state of [place]? YES    Visit mode: VIDEO    If the visit is dropped, the patient can be reconnected by:VIDEO VISIT: Send to e-mail at: [email or web address]    Will anyone else be joining the visit? NO  (If patient encounters technical issues they should call [phone]    Are changes needed to the allergy or medication list? No    Are refills needed on medications prescribed by this physician? NO    Rooming Documentation:  Questionnaire(s) completed    Pt reports starting a new stressful job two weeks ago - situation stress    Reason for visit: Consult    Brenda MCDUFFIEF

## 2025-03-10 NOTE — PATIENT INSTRUCTIONS
"          MY TREATMENT INFORMATION FOR SLEEP APNEA-  Adam Garcia    DOCTOR : Bennett Goltz, PA-C    Am I having a sleep study at a sleep center?  --->Due to normal delays, you will be contacted within 2-4 weeks to schedule    Am I having a home sleep study?  --->Watch the video for the device you are using:    -/drop off device-   https://www.ShieldEffect.com/watch?v=yGGFBdELGhk    Frequently asked questions:  1. What is Obstructive Sleep Apnea (EVANGELINA)? EVANGELINA is the most common type of sleep apnea. Apnea means, \"without breath.\"  Apnea is most often caused by narrowing or collapse of the upper airway as muscles relax during sleep.   Almost everyone has occasional apneas. Most people with sleep apnea have had brief interruptions at night frequently for many years.  The severity of sleep apnea is related to how frequent and severe the events are.   2. What are the consequences of EVANGELINA? Symptoms include: feeling sleepy during the day, snoring loudly, gasping or stopping of breathing, trouble sleeping, and occasionally morning headaches or heartburn at night.  Sleepiness can be serious and even increase the risk of falling asleep while driving. Other health consequences may include development of high blood pressure and other cardiovascular disease in persons who are susceptible. Untreated EVANGELINA  can contribute to heart disease, stroke and diabetes.   3. What are the treatment options? In most situations, sleep apnea is a lifelong disease that must be managed with daily therapy. Medications are not effective for sleep apnea and surgery is generally not considered until other therapies have been tried. Your treatment is your choice. Continuous Positive Airway (CPAP) works right away and is the therapy that is effective in nearly everyone. An oral device to hold your jaw forward is usually the next most reliable option. Other options include postioning devices (to keep you off your back), weight loss, and surgery " including a tongue pacing device. There is more detail about some of these options below.  4. Are my sleep studies covered by insurance? Although we will request verification of coverage, we advise you also check in advance of the study to ensure there is coverage.    Important tips for those choosing CPAP and similar devices  REMEMBER-IF YOU RECEIVE A CALL FROM  [phone]-->IT IS TO SETUP A DEVICE  For new devices, sign up for device MACKENZIE to monitor your device for your followup visits  We encourage you to utilize the [employer] mackenzie or website ( https://[employer]/ ) to monitor your therapy progress and share the data with your healthcare team when you discuss your sleep apnea.                                                    Know your equipment:  CPAP is continuous positive airway pressure that prevents obstructive sleep apnea by keeping the throat from collapsing while you are sleeping. In most cases, the device is  smart  and can slowly self-adjusts if your throat collapses and keeps a record every day of how well you are treated-this information is available to you and your care team.  BPAP is bilevel positive airway pressure that keeps your throat open and also assists each breath with a pressure boost to maintain adequate breathing.  Special kinds of BPAP are used in patients who have inadequate breathing from lung or heart disease. In most cases, the device is  smart  and can slowly self-adjusts to assist breathing. Like CPAP, the device keeps a record of how well you are treated.  Your mask is your connection to the device. You get to choose what feels most comfortable and the staff will help to make sure if fits. Here: are some examples of the different masks that are available: Magnetic mask aids may assist with use but there are safety issues that should be addressed when considering with magnets* ( see end of discussion).       Key points to remember on your journey with sleep  apnea:  Sleep study.  PAP devices often need to be adjusted during a sleep study to show that they are effective and adjusted right.  Good tips to remember: Try wearing just the mask during a quiet time during the day so your body adapts to wearing it. A humidifier is recommended for comfort in most cases to prevent drying of your nose and throat. Allergy medication from your provider may help you if you are having nasal congestion.  Getting settled-in. It takes more than one night for most of us to get used to wearing a mask. Try wearing just the mask during a quiet time during the day so your body adapts to wearing it. A humidifier is recommended for comfort in most cases. Our team will work with you carefully on the first day and will be in contact within 4 days and again at 2 and 4 weeks for advice and remote device adjustments. Your therapy is evaluated by the device each day.   Use it every night. The more you are able to sleep naturally for 7-8 hours, the more likely you will have good sleep and to prevent health risks or symptoms from sleep apnea. Even if you use it 4 hours it helps. Occasionally all of us are unable to use a medical therapy, in sleep apnea, it is not dangerous to miss one night.   Communicate. Call our skilled team on the number provided on the first day if your visit for problems that make it difficult to wear the device. Over 2 out of 3 patients can learn to wear the device long-term with help from our team. Remember to call our team or your sleep providers if you are unable to wear the device as we may have other solutions for those who cannot adapt to mask CPAP therapy. It is recommended that you sleep your sleep provider within the first 3 months and yearly after that if you are not having problems.   Use it for your health. We encourage use of CPAP masks during daytime quiet periods to allow your face and brain to adapt to the sensation of CPAP so that it will be a more natural  sensation to awaken to at night or during naps. This can be very useful during the first few weeks or months of adapting to CPAP though it does not help medically to wear CPAP during wakefulness and  should not be used as a strategy just to meet guidelines.  Take care of your equipment. Make sure you clean your mask and tubing using directions every day and that your filter and mask are replaced as recommended or if they are not working.     *Masks with magnets:  Updated Contraindications  Masks with magnetic components are contraindicated for use by patients where they, or anyone in close physical contact while using the mask, have the following:   Active medical implants that interact with magnets (i.e., pacemakers, implantable cardioverter defibrillators (ICD), neurostimulators, cerebrospinal fluid (CSF) shunts, insulin/infusion pumps)   Metallic implants/objects containing ferromagnetic material (i.e., aneurysm clips/flow disruption devices, embolic coils, stents, valves, electrodes, implants to restore hearing or balance with implanted magnets, ocular implants, metallic splinters in the eye)  Updated Warning  Keep the mask magnets at a safe distance of at least 6 inches (150 mm) away from implants or medical devices that may be adversely affected by magnetic interference. This warning applies to you or anyone in close physical contact with your mask. The magnets are in the frame and lower headgear clips, with a magnetic field strength of up to 400mT. When worn, they connect to secure the mask but may inadvertently detach while asleep.  Implants/medical devices, including those listed within contraindications, may be adversely affected if they change function under external magnetic fields or contain ferromagnetic materials that attract/repel to magnetic fields (some metallic implants, e.g., contact lenses with metal, dental implants, metallic cranial plates, screws, gracy hole covers, and bone substitute  devices). Consult your physician and  of your implant / other medical device for information on the potential adverse effects of magnetic fields.    BESIDES CPAP, WHAT OTHER THERAPIES ARE THERE?    Positioning Device  Positioning devices are generally used when sleep apnea is mild and only occurs on your back.This example shows a pillow that straps around the waist. It may be appropriate for those whose sleep study shows milder sleep apnea that occurs primarily when lying flat on one's back. Preliminary studies have shown benefit but effectiveness at home may need to be verified by a home sleep test. These devices are generally not covered by medical insurance.  Examples of devices that maintain sleeping on the back to prevent snoring and mild sleep apnea.    Belt type body positioner  http://[employer] Elf/    Electronic reminder  http://nightshifttherapy.com/            Oral Appliance  What is oral appliance therapy?  An oral appliance device fits on your teeth at night like a retainer used after having braces. The device is made by a specialized dentist and requires several visits over 1-2 months before a manufactured device is made to fit your teeth and is adjusted to prevent your sleep apnea. Once an oral device is working properly, snoring should be improved. A home sleep test may be recommended at that time if to determine whether the sleep apnea is adequately treated.       Some things to remember:  -Oral devices are often, but not always, covered by your medical insurance. Be sure to check with your insurance provider.   -If you are referred for oral therapy, you will be given a list of specialized dentists to consider or you may choose to visit the Web site of the American Academy of Dental Sleep Medicine  -Oral devices are less likely to work if you have severe sleep apnea or are extremely overweight.     More detailed information  An oral appliance is a small acrylic device that fits over the  upper and lower teeth  (similar to a retainer or a mouth guard). This device slightly moves jaw forward, which moves the base of the tongue forward, opens the airway, improves breathing for effective treat snoring and obstructive sleep apnea in perhaps 7 out of 10 people .  The best working devices are custom-made by a dental device  after a mold is made of the teeth 1, 2, 3.  When is an oral appliance indicated?  Oral appliance therapy is recommended as a first-line treatment for patients with primary snoring, mild sleep apnea, and for patients with moderate sleep apnea who prefer appliance therapy to use of CPAP4, 5. Severity of sleep apnea is determined by sleep testing and is based on the number of respiratory events per hour of sleep.   How successful is oral appliance therapy?  The success rate of oral appliance therapy in patients with mild sleep apnea is 75-80% while in patients with moderate sleep apnea it is 50-70%. The chance of success in patients with severe sleep apnea is 40-50%. The research also shows that oral appliances have a beneficial effect on the cardiovascular health of EVANGELINA patients at the same magnitude as CPAP therapy7.  Oral appliances should be a second-line treatment in cases of severe sleep apnea, but if not completely successful then a combination therapy utilizing CPAP plus oral appliance therapy may be effective. Oral appliances tend to be effective in a broad range of patients although studies show that the patients who have the highest success are females, younger patients, those with milder disease, and less severe obesity. 3, 6.   Finding a dentist that practices dental sleep medicine  Specific training is available through the American Academy of Dental Sleep Medicine for dentists interested in working in the field of sleep. To find a dentist who is educated in the field of sleep and the use of oral appliances, near you, visit the Web site of the American Academy of  Dental Sleep Medicine.    References  1. Linh et al. Objectively measured vs self-reported compliance during oral appliance therapy for sleep-disordered breathing. Chest 2013; 144(5): 7754-0470.  2. Brenda et al. Objective measurement of compliance during oral appliance therapy for sleep-disordered breathing. Thorax 2013; 68(1): 91-96.  3. Charles, et al. Mandibular advancement devices in 620 men and women with EVANGELINA and snoring: tolerability and predictors of treatment success. Chest 2004; 125: 1389-2119.  4. Jimbo et al. Oral appliances for snoring and EVANGELINA: a review. Sleep 2006; 29: 244-262.  5. Xiomara et al. Oral appliance treatment for EVANGELINA: an update. J Clin Sleep Med 2014; 10(2): 215-227.  6. Noah et al. Predictors of OSAH treatment outcome. J Dent Res 2007; 86: 7535-9021.      Weight Loss:   Your Body mass index is 40.14 kg/m .    Being overweight does not necessarily mean you will have health consequences.  Those who have BMI over 30 or over 27 with existing medical conditions carries greater risk.   Weight loss decreases severity of sleep apnea in most people with obesity. For those with mild obesity who have developed snoring with weight gain, even 15-30 pound weight loss can improve and occasionally milder eliminate sleep apnea.  Structured and life-long dietary and health habits are necessary to lose weight and keep healthier weight levels.     The Comprehensive Weight loss program offers all aspects of weight loss strategies including two Non-Surgical Weight Loss Programs: Medical Weight Management and our 24 Week Healthy Lifestyle Program:  Medical Weight Management: You will meet with a Medical Weight Management Provider, as well as a Registered Dietician. The program may include medication therapy, dietary education, recommended exercise and physical therapy programs, monthly support group meetings, and possible psychological counseling. Follow up visits with the provider or  dietician are scheduled based on your progress and needs.  24 Week Healthy Lifestyle Program: This unique program is designed to give you the support of weekly appointments and activities thru a 24-week period. It may include all of the components of the basic program (above), with the addition of 11 individual Health  Visits, 24-week access to the [employer] website for over 700 online classes, and monthly support group meetings. This program has an out-of-pocket expense of $499 to cover the items that can not be billed to insurance (health coaches and [employer] access), and is non-refundable/non-transferable (you may be able to use a Health Savings Account; ask your HSA provider). There may be an optional meal replacement plan prescribed as well.   Medication therapy has been approved for the treatment of sleep apnea: The FDA approved tirzepatide for moderate to severe sleep apnea (apnea-hypopnea index greater than or equal to 15) in patients with BMI of greater than or equal to 30, or BMI greater than or equal to 27 with at least 1 weight-related condition such as hypertension or dyslipidemia.  Surgical management achieves meaningful long-term weight loss and improvement in health risks in most patients with more severe obesity.      Sleep Apnea Surgery:    Surgery for obstructive sleep apnea is considered generally only when other therapies fail to work. Surgery may be discussed with you if you are having a difficult time tolerating CPAP and or when there is an abnormal structure that requires surgical correction.  Nose and throat surgeries often enlarge the airway to prevent collapse.  Most of these surgeries create pain for 1-2 weeks and up to half of the most common surgeries are not effective throughout life.  You should carefully discuss the benefits and drawbacks to surgery with your sleep provider and surgeon to determine if it is the best solution for you.   More information  Surgery for EVANGELINA is  directed at areas that are responsible for narrowing or complete obstruction of the airway during sleep.  There are a wide range of procedures available to enlarge and/or stabilize the airway to prevent blockage of breathing in the three major areas where it can occur: the palate, tongue, and nasal regions.  Successful surgical treatment depends on the accurate identification of the factors responsible for obstructive sleep apnea in each person.  A personalized approach is required because there is no single treatment that works well for everyone.  Because of anatomic variation, consultation with an examination by a sleep surgeon is a critical first step in determining what surgical options are best for each patient.  In some cases, examination during sedation may be recommended in order to guide the selection of procedures.  Patients will be counseled about risks and benefits as well as the typical recovery course after surgery. Surgery is typically not a cure for a person s EVANGELINA.  However, surgery will often significantly improve one s EVANGELINA severity (termed  success rate ).  Even in the absence of a cure, surgery will decrease the cardiovascular risk associated with OSA7; improve overall quality of life8 (sleepiness, functionality, sleep quality, etc).  Palate Procedures:  Patients with EVANGELINA often have narrowing of their airway in the region of their tonsils and uvula.  The goals of palate procedures are to widen the airway in this region as well as to help the tissues resist collapse.  Modern palate procedure techniques focus on tissue conservation and soft tissue rearrangement, rather than tissue removal.  Often the uvula is preserved in this procedure. Residual sleep apnea is common in patient after pharyngoplasty with an average reduction in sleep apnea events of 33%2.    Tongue Procedures:  ExamWhile patients are awake, the muscles that surround the throat are active and keep this region open for breathing.  These muscles relax during sleep, allowing the tongue and other structures to collapse and block breathing.  There are several different tongue procedures available.  Selection of a tongue base procedure depends on characteristics seen on physical exam.  Generally, procedures are aimed at removing bulky tissues in this area or preventing the back of the tongue from falling back during sleep.  Success rates for tongue surgery range from 50-62%3.  Hypoglossal Nerve Stimulation:  Hypoglossal nerve stimulation has recently received approval from the [place] Food and Drug Administration for the treatment of obstructive sleep apnea.  This is based on research showing that the system was safe and effective in treating sleep apnea6.  Results showed that the median AHI score decreased 68%, from 29.3 to 9.0. This therapy uses an implant system that senses breathing patterns and delivers mild stimulation to airway muscles, which keeps the airway open during sleep.  The system consists of three fully implanted components: a small generator (similar in size to a pacemaker), a breathing sensor, and a stimulation lead.  Using a small handheld remote, a patient turns the therapy on before bed and off upon awakening.    Candidates for this device must be greater than 18 years of age, have moderate to severe obstructive sleep apnea with less than 25% central events  (AHI between 15-65), BMI less than 35, have tried CPAP/oral appliance for at least 8 weeks without success, and have appropriate upper airway anatomy (determined by a sleep endoscopy performed by Dr. Mayur Middleton or Dr. Nile Flynn).     Nasal Procedures:  Nasal obstruction can interfere with nasal breathing during the day and night.  Studies have shown that relief of nasal obstruction can improve the ability of some patients to tolerate positive airway pressure therapy for obstructive sleep apnea1.  Treatment options include medications such as nasal saline,  topical corticosteroid and antihistamine sprays, and oral medications such as antihistamines or decongestants. Non-surgical treatments can include external nasal dilators for selected patients. If these are not successful by themselves, surgery can improve the nasal airway either alone or in combination with these other options.    Combination Procedures:  Combination of surgical procedures and other treatments may be recommended, particularly if patients have more than one area of narrowing or persistent positional disease.  The success rate of combination surgery ranges from 66-80%2,3.    References  Sudeep CARRANZA. The Role of the Nose in Snoring and Obstructive Sleep Apnoea: An Update.  Eur Arch Otorhinolaryngol. 2011; 268: 1365-73.   Nubia SM; Connie JA; Rita JR; Pallanch JF; Jutsus MB; Sharad SG; Vivi IYER. Surgical modifications of the upper airway for obstructive sleep apnea in adults: a systematic review and meta-analysis. SLEEP 2010;33(10):0797-8269. Pina STORY. Hypopharyngeal surgery in obstructive sleep apnea: an evidence-based medicine review.  Arch Otolaryngol Head Neck Surg. 2006 Feb;132(2):206-13.  Baljinder YH1, Wisam Y, Ankit MARITZA. The efficacy of anatomically based multilevel surgery for obstructive sleep apnea. Otolaryngol Head Neck Surg. 2003 Oct;129(4):327-35.  Pina STORY, Goldberg A. Hypopharyngeal Surgery in Obstructive Sleep Apnea: An Evidence-Based Medicine Review. Arch Otolaryngol Head Neck Surg. 2006 Feb;132(2):206-13.  Alvin PJ et al. Upper-Airway Stimulation for Obstructive Sleep Apnea.  N Engl J Med. 2014 Jan 9;370(2):139-49.  Pecarter Y et al. Increased Incidence of Cardiovascular Disease in Middle-aged Men with Obstructive Sleep Apnea. Am J Respir Crit Care Med; 2002 166: 159-165  Estvean EM et al. Studying Life Effects and Effectiveness of Palatopharyngoplasty (SLEEP) study: Subjective Outcomes of Isolated Uvulopalatopharyngoplasty. Otolaryngol Head Neck Surg. 2011; 144: 623-631.    WHAT IF  I ONLY HAVE SNORING?  Mandibular advancement devices, lateral sleep positioning, long-term weight loss and treatment of nasal allergies have been shown to improve snoring.  Exercising tongue muscles with a game (https://apps.Zeus.Flypeeps/us/mackenzie/soundly-reduce-snoring/vq2512006424) or stimulating the tongue during the day with a device (https://doi.org/10.3390/cij44387928) have improved snoring in some individuals.  https://www.[employer].Flypeeps/  https://www.sleepfoundation.org/best-anti-snoring-mouthpieces-and-mouthguards  Remember to Drive Safe... Drive Alive     Sleep health profoundly affects your health, mood, and your safety.  Thirty three percent of the population (one in three of us) is not getting enough sleep and many have a sleep disorder. Not getting enough sleep or having an untreated / undertreated sleep condition may make us sleepy without even knowing it. In fact, our driving could be dramatically impaired due to our sleep health. As your provider, here are some things I would like you to know about driving:     Here are some warning signs for impairment and dangerous drowsy driving:              -Having been awake more than 16 hours               -Looking tired               -Eyelid drooping              -Head nodding (it could be too late at this point)              -Driving for more than 30 minutes     Some things you could do to make the driving safer if you are experiencing some drowsiness:              -Stop driving and rest              -Call for transportation              -Make sure your sleep disorder is adequately treated     Some things that have been shown NOT to work when experiencing drowsiness while driving:              -Turning on the radio              -Opening windows              -Eating any  distracting  /  entertaining  foods (e.g., sunflower seeds, candy, or any other)              -Talking on the phone      Your decision may not only impact your life, but also the life of others.  Please, remember to drive safe for yourself and all of us.

## 2025-05-30 ASSESSMENT — SLEEP AND FATIGUE QUESTIONNAIRES
HOW LIKELY ARE YOU TO NOD OFF OR FALL ASLEEP WHILE SITTING INACTIVE IN A PUBLIC PLACE: WOULD NEVER DOZE
HOW LIKELY ARE YOU TO NOD OFF OR FALL ASLEEP WHILE LYING DOWN TO REST IN THE AFTERNOON WHEN CIRCUMSTANCES PERMIT: MODERATE CHANCE OF DOZING
HOW LIKELY ARE YOU TO NOD OFF OR FALL ASLEEP WHILE WATCHING TV: MODERATE CHANCE OF DOZING
HOW LIKELY ARE YOU TO NOD OFF OR FALL ASLEEP WHILE SITTING QUIETLY AFTER LUNCH WITHOUT ALCOHOL: WOULD NEVER DOZE
HOW LIKELY ARE YOU TO NOD OFF OR FALL ASLEEP WHILE SITTING AND READING: MODERATE CHANCE OF DOZING
HOW LIKELY ARE YOU TO NOD OFF OR FALL ASLEEP WHEN YOU ARE A PASSENGER IN A CAR FOR AN HOUR WITHOUT A BREAK: SLIGHT CHANCE OF DOZING
HOW LIKELY ARE YOU TO NOD OFF OR FALL ASLEEP IN A CAR, WHILE STOPPED FOR A FEW MINUTES IN TRAFFIC: WOULD NEVER DOZE
HOW LIKELY ARE YOU TO NOD OFF OR FALL ASLEEP WHILE SITTING AND TALKING TO SOMEONE: WOULD NEVER DOZE

## 2025-06-02 ENCOUNTER — OFFICE VISIT (OUTPATIENT)
Dept: SLEEP MEDICINE | Facility: CLINIC | Age: 40
End: 2025-06-02
Attending: PHYSICIAN ASSISTANT
Payer: COMMERCIAL

## 2025-06-02 DIAGNOSIS — G47.30 OBSERVED SLEEP APNEA: ICD-10-CM

## 2025-06-02 DIAGNOSIS — R06.83 SNORING: ICD-10-CM

## 2025-06-02 NOTE — PROGRESS NOTES
Pt is completing a home sleep test. Pt was instructed on how to put on the Noxturnal T3 device and associated equipment before going to bed and given the opportunity to practice putting it on before leaving the sleep center. Pt was reminded to bring the home sleep test kit back to the center tomorrow, at the scheduled time for download and reporting. Patient was instructed to complete study using the following treatment?  None  ESS: 7  Stop Ban  Device number: 30  Mary Duncan CMA on 2025 at 10:26 AM

## 2025-06-03 ENCOUNTER — DOCUMENTATION ONLY (OUTPATIENT)
Dept: SLEEP MEDICINE | Facility: CLINIC | Age: 40
End: 2025-06-03
Payer: COMMERCIAL

## 2025-06-03 NOTE — PROGRESS NOTES
Pt returned HST device. It was downloaded and forwarded data to the clinical specialist for scoring.   Mary Duncan CMA on 6/3/2025 at 9:09 AM

## 2025-06-03 NOTE — PROGRESS NOTES
HST POST-STUDY QUESTIONNAIRE    What time did you go to bed?  1 am  How long do you think it took to fall asleep?  40 mins  What time did you wake up to start the day?  6:30 am  Did you get up during the night at all?  Yes  If you woke up, do you remember approximately what time(s)? ~ 3:30 am  Did you have any difficulty with the equipment?  No  Did you us any type of treatment with this study?  None  Was the head of the bed elevated? No  Did you sleep in a recliner?  No  Did you stop using CPAP at least 3 days before this test?  NA  Any other information you'd like us to know? Walked into a post in the dark, difficulty sleeping because of pain.

## 2025-06-08 ENCOUNTER — HEALTH MAINTENANCE LETTER (OUTPATIENT)
Age: 40
End: 2025-06-08

## (undated) DEVICE — SU SILK 0 TIE 6X30" A306H

## (undated) DEVICE — SU ETHIBOND 3-0 BBDA 36" X588H

## (undated) DEVICE — SU PROLENE 6-0 C-1DA 30" 8706H

## (undated) DEVICE — GLOVE BIOGEL PI MICRO SZ 7.5 48575

## (undated) DEVICE — DECANTER BAG 2002S

## (undated) DEVICE — SU PROLENE 3-0 SHDA 36" 8522H

## (undated) DEVICE — DEFIB PRO-PADZ LVP LQD GEL ADULT 8900-2105-01

## (undated) DEVICE — DRSG DRAIN 4X4" 7086

## (undated) DEVICE — WIPES FOLEY CARE SURESTEP PROVON DFC100

## (undated) DEVICE — DRAPE FLUID WARMING 52 X 60" ORS-321

## (undated) DEVICE — SU ETHIBOND 0 CT-1 CR 8X18" CX21D

## (undated) DEVICE — ANTIFOG SOLUTION W/FOAM PAD CF-1002

## (undated) DEVICE — SU ETHIBOND 2-0 V-5 DA 10X30" 10X52

## (undated) DEVICE — DRSG TELFA 3X8" 1238

## (undated) DEVICE — SU STEEL MYO/WIRE II STERNOTOMY 8 BE-1 3X14" 048-217

## (undated) DEVICE — SU VICRYL 0 CTX 36" J370H

## (undated) DEVICE — ENDO TROCAR FIRST ENTRY KII FIOS Z-THRD 12X100MM CTF73

## (undated) DEVICE — SU DERMABOND ADVANCED .7ML DNX12

## (undated) DEVICE — SU PLEDGET SOFT TFE 3/8"X3/26"X1/16" PCP40

## (undated) DEVICE — SU DEVICE ENDO COR KNOT QUICK LOAD 030850

## (undated) DEVICE — SUCTION DRY CHEST DRAIN OASIS 3600-100

## (undated) DEVICE — PROTECTOR ARM ONE-STEP TRENDELENBURG 40418

## (undated) DEVICE — SUCTION MANIFOLD NEPTUNE 2 SYS 4 PORT 0702-020-000

## (undated) DEVICE — ESU HOLSTER PLASTIC DISP E2400

## (undated) DEVICE — LEAD PACER MYOCARDIAL BIPOLAR TEMPORARY 53CM 6495F

## (undated) DEVICE — BLADE SAW STRK STERNAL 6207-97-101

## (undated) DEVICE — PACK ADULT HEART UMMC PV15CG92D

## (undated) DEVICE — SU PROLENE 4-0 SHDA 36" 8521H

## (undated) DEVICE — SOL NACL 0.9% INJ 1000ML BAG 2B1324X

## (undated) DEVICE — Device

## (undated) DEVICE — PREP CHLORAPREP 26ML TINTED HI-LITE ORANGE 930815

## (undated) DEVICE — BLADE CLIPPER SGL USE 9680

## (undated) DEVICE — SU DEVICE ENDO COR KNOT QUICK LOAD RELOAD 030874

## (undated) DEVICE — SOL NACL 0.9% 10ML VIAL 0409-4888-02

## (undated) DEVICE — DRAIN CHEST TUBE 32FR STR 8032

## (undated) DEVICE — DEVICE TISSUE STABILIZATION OCTOBASE 28707

## (undated) DEVICE — DRSG ABDOMINAL 07 1/2X8" 7197D

## (undated) DEVICE — SU PROLENE 5-0 RB-2DA 30" 8710H

## (undated) DEVICE — SU PROLENE 4-0 RB-1DA 36" 8557H

## (undated) DEVICE — SU ETHIBOND 2-0 CT-1 8X18" CX22D

## (undated) DEVICE — LINEN TOWEL PACK X30 5481

## (undated) DEVICE — TUBING SUCTION DRAINAGE PLEURAL DUAL 8884714200

## (undated) DEVICE — BONE WAX OSTENE 2.5GM BW-012

## (undated) DEVICE — SU PROLENE 7-0 BV-1DA 24" 8702H

## (undated) DEVICE — DRAPE IOBAN INCISE 23X17" 6650EZ

## (undated) DEVICE — SU DEVICE COR-KNOT MINI 4X14MM 031350

## (undated) DEVICE — SUCTION CATH AIRLIFE TRI-FLO W/CONTROL PORT 14FR  T60C

## (undated) DEVICE — TIES BANDING T50R

## (undated) DEVICE — LINEN TOWEL PACK X6 WHITE 5487

## (undated) DEVICE — TAPE MEDIPORE 4"X2YD 2864

## (undated) DEVICE — SU VICRYL+ 3-0 FS1 27IN UND VCP442H

## (undated) DEVICE — SU STEEL 6 CCS 4X18" M654G

## (undated) DEVICE — SURGICEL HEMOSTAT 4X8" 1952

## (undated) DEVICE — DRAPE COVER ROBOTIC ARM (UNITRAC RETRACTOR) JG901

## (undated) DEVICE — SOL NACL 0.9% IRRIG 3000ML BAG 2B7477

## (undated) DEVICE — SOL NACL 0.9% IRRIG 1000ML BOTTLE 2F7124

## (undated) DEVICE — NDL COUNTER 40CT  31142311

## (undated) DEVICE — SU ETHIBOND 2-0 SHDA 30" X563H

## (undated) DEVICE — DRAIN CHEST TUBE RIGHT ANGLED 32FR 8132

## (undated) DEVICE — SU ETHIBOND 3-0 RB-1DA 36" X558H

## (undated) DEVICE — ESU PENCIL W/COATED BLADE E2450H

## (undated) RX ORDER — ASPIRIN 81 MG/1
TABLET ORAL
Status: DISPENSED
Start: 2024-06-11

## (undated) RX ORDER — NALOXONE HYDROCHLORIDE 0.4 MG/ML
INJECTION, SOLUTION INTRAMUSCULAR; INTRAVENOUS; SUBCUTANEOUS
Status: DISPENSED
Start: 2024-03-28

## (undated) RX ORDER — FENTANYL CITRATE 50 UG/ML
INJECTION, SOLUTION INTRAMUSCULAR; INTRAVENOUS
Status: DISPENSED
Start: 2024-06-11

## (undated) RX ORDER — GLYCOPYRROLATE 0.2 MG/ML
INJECTION, SOLUTION INTRAMUSCULAR; INTRAVENOUS
Status: DISPENSED
Start: 2024-03-28

## (undated) RX ORDER — ACETAMINOPHEN 325 MG/1
TABLET ORAL
Status: DISPENSED
Start: 2024-06-11

## (undated) RX ORDER — GABAPENTIN 300 MG/1
CAPSULE ORAL
Status: DISPENSED
Start: 2024-06-11

## (undated) RX ORDER — HEPARIN SODIUM 1000 [USP'U]/ML
INJECTION, SOLUTION INTRAVENOUS; SUBCUTANEOUS
Status: DISPENSED
Start: 2024-06-11

## (undated) RX ORDER — METOPROLOL TARTRATE 1 MG/ML
INJECTION, SOLUTION INTRAVENOUS
Status: DISPENSED
Start: 2024-04-08

## (undated) RX ORDER — PROPOFOL 10 MG/ML
INJECTION, EMULSION INTRAVENOUS
Status: DISPENSED
Start: 2024-06-11

## (undated) RX ORDER — CEFAZOLIN SODIUM 1 G/3ML
INJECTION, POWDER, FOR SOLUTION INTRAMUSCULAR; INTRAVENOUS
Status: DISPENSED
Start: 2024-06-11

## (undated) RX ORDER — IVABRADINE 5 MG/1
TABLET, FILM COATED ORAL
Status: DISPENSED
Start: 2024-04-08

## (undated) RX ORDER — CEFAZOLIN SODIUM/WATER 2 G/20 ML
SYRINGE (ML) INTRAVENOUS
Status: DISPENSED
Start: 2024-06-11

## (undated) RX ORDER — HYDROMORPHONE HYDROCHLORIDE 1 MG/ML
INJECTION, SOLUTION INTRAMUSCULAR; INTRAVENOUS; SUBCUTANEOUS
Status: DISPENSED
Start: 2024-06-11

## (undated) RX ORDER — METOPROLOL TARTRATE 50 MG
TABLET ORAL
Status: DISPENSED
Start: 2024-04-08

## (undated) RX ORDER — FENTANYL CITRATE 50 UG/ML
INJECTION, SOLUTION INTRAMUSCULAR; INTRAVENOUS
Status: DISPENSED
Start: 2024-03-28

## (undated) RX ORDER — FAMOTIDINE 20 MG/1
TABLET, FILM COATED ORAL
Status: DISPENSED
Start: 2024-06-11

## (undated) RX ORDER — FLUMAZENIL 0.1 MG/ML
INJECTION, SOLUTION INTRAVENOUS
Status: DISPENSED
Start: 2024-03-28

## (undated) RX ORDER — LIDOCAINE HYDROCHLORIDE 40 MG/ML
SOLUTION TOPICAL
Status: DISPENSED
Start: 2024-03-28